# Patient Record
Sex: FEMALE | Employment: OTHER | URBAN - METROPOLITAN AREA
[De-identification: names, ages, dates, MRNs, and addresses within clinical notes are randomized per-mention and may not be internally consistent; named-entity substitution may affect disease eponyms.]

---

## 2017-02-08 ENCOUNTER — OFFICE VISIT (OUTPATIENT)
Dept: HEMATOLOGY | Age: 72
End: 2017-02-08

## 2017-02-08 VITALS
TEMPERATURE: 97.1 F | SYSTOLIC BLOOD PRESSURE: 161 MMHG | BODY MASS INDEX: 24.24 KG/M2 | DIASTOLIC BLOOD PRESSURE: 80 MMHG | OXYGEN SATURATION: 97 % | HEART RATE: 94 BPM | WEIGHT: 142 LBS | RESPIRATION RATE: 19 BRPM | HEIGHT: 64 IN

## 2017-02-08 DIAGNOSIS — R74.8 ELEVATED LIVER ENZYMES: Primary | ICD-10-CM

## 2017-02-08 RX ORDER — ONDANSETRON 4 MG/1
TABLET, ORALLY DISINTEGRATING ORAL
COMMUNITY
Start: 2017-01-09

## 2017-02-08 NOTE — PROGRESS NOTES
2 Jaimedeena Anthony Varner MD, JESÚS Fleming PA-C Erskine Serene, MD, JESÚS Heard NP        at [address], [address] Abilio Lewis  22.     [phone]     FAX: [phone]    at [facility], [address] [place] [address], [address]     [phone]     FAX: [phone]       Patient Care Team:  Kayli Carter MD as PCP - General (Family Practice)  Jael Churchill MD (Gastroenterology)    Problem List  Date Reviewed: 2/8/2017          Codes Class Noted    Elevated liver enzymes ICD-10-CM: R74.8  ICD-9-CM: 790.5  7/6/2015        Frequent UTI ICD-10-CM: N39.0  ICD-9-CM: 599.0  7/6/2015        Depression ICD-10-CM: F32.9  ICD-9-CM: 709  7/6/2015        Hypertension ICD-10-CM: I10  ICD-9-CM: 401.9  7/6/2015        IBS (irritable bowel syndrome) ICD-10-CM: K58.9  ICD-9-CM: 564.1  7/6/2015        S/P partial colectomy ICD-10-CM: Z90.49  ICD-9-CM: V45.89  7/6/2015    Overview Signed 7/6/2015 10:50 AM by MY Klein     diverticulosis                     The physicians listed above have asked me to see Jayce Cruz in consultation regarding elevated liver enzymes presumed to be due to fatty liver disease and to perform assessment of fibrosis by means of in-office fibroscan analysis. The active problem list, all pertinent past medical history, medications, liver histology, endoscopic studies, radiologic findings and laboratory findings related to the liver disorder were reviewed with the patient. The patient participated in a clinical trail at [facility] [place] [facility] [address] for LOPEZ last year. She has had elevated liver enzymes for over 5 years. She reports that liver biopsy was performed ~6 years ago, this was consistent with \"little scar tissue. \"  Patient was originally worked up for hemochromatosis and was found to have one gene mutation. The patient is a 67 y.o.  female who was diagnosed with liver disease  in 2010. The patient has no symptoms which could be attributed to the liver disorder. The patient completes all daily activities without any functional limitations. The patient has not experienced fatigue, fevers, chills, shortness of breath, chest pain, pain in the right side over the liver, diffuse abdominal pain, nausea, vomiting, constipation, diarrhrea, dry eyes, dry mouth, arthralgias, myalgias, yellowing of the eyes or skin, itching, dark urine, problems concentrating, swelling of the abdomen, swelling of the lower extremities, hematemesis, or hematochezia. ALLERGIES:  Allergies   Allergen Reactions    Flagyl [Metronidazole] Nausea Only    Macrobid [Nitrofurantoin Monohyd/M-Cryst] Nausea Only    Sulfa (Sulfonamide Antibiotics) Rash       MEDICATIONS:  Current Outpatient Prescriptions   Medication Sig    lisinopril (PRINIVIL, ZESTRIL) 20 mg tablet Take  by mouth daily.  amLODIPine (NORVASC) 5 mg tablet Take 5 mg by mouth daily.  omeprazole (PRILOSEC) 20 mg capsule Take 20 mg by mouth daily.  metoclopramide HCl (REGLAN) 5 mg tablet Take 5 mg by mouth Before breakfast, lunch, and dinner.  ALPRAZolam (XANAX) 0.5 mg tablet Take  by mouth.  sertraline (ZOLOFT) 100 mg tablet Take  by mouth daily.  ondansetron (ZOFRAN ODT) 4 mg disintegrating tablet      No current facility-administered medications for this visit. SYSTEM REVIEW NOT RELATED TO LIVER DISEASE OR REVIEWED ABOVE:  Constitution systems: Negative for fever, chills, weight gain, weight loss. Eyes: Negative for visual changes. ENT: Negative for sore throat, painful swallowing. Respiratory: Negative for cough, hemoptysis, SOB. Cardiology: Negative for chest pain, palpitations. GI:  Negative for constipation or diarrhea. : Positive for frequent UTIs, dysuria, hematuria, nocturia.    Skin: Negative for rash. Hematology: Negative for easy bruising, blood clots. Musculo-skelatal: Negative for back pain, muscle pain, weakness. Neurologic: Negative for headaches, dizziness, vertigo, memory problems not related to HE. Psychology: Negative for anxiety, depression. FAMILY HISTORY:  There is no family history of liver disease. SOCIAL HISTORY:  The patient is . The patient 1 child. The patient has never used tobacco products. The patient consumes alcoholic beverages rarely and never in excess. The patient does not work. PHYSICAL EXAMINATION:  Visit Vitals    /80 (BP 1 Location: Left arm, BP Patient Position: Sitting)    Pulse 94    Temp 97.1 °F (36.2 °C) (Oral)    Resp 19    Ht 5' 4\" (1.626 m)    Wt 142 lb (64.4 kg)    SpO2 97%    BMI 24.37 kg/m2     General: No acute distress. Skin: No spider angiomata. No jaundice. No palmar erythema. Abdomen: Soft non-tender. Liver size normal to percussion/palpation. Spleen not palpable. No obvious ascites. Extremities: No edema. No muscle wasting. No gross arthritic changes. Neurologic: Alert and oriented. Cranial nerves grossly intact. No asterixis. LABORATORY STUDIES:  [facility] [address] [place] [address] & Units 4/25/2016   AST 0 - 40 IU/L 36   ALT 0 - 32 IU/L 33 (H)   Alk Phos 39 - 117 IU/L 78   Bili, Total 0.0 - 1.2 mg/dL 0.5   Albumin 3.5 - 4.8 g/dL 4.8   BUN 8 - 27 mg/dL 18   Creat 0.57 - 1.00 mg/dL 0.91   Na 134 - 144 mmol/L 137   K 3.5 - 5.2 mmol/L 4.4   Cl 97 - 108 mmol/L 99   CO2 18 - 29 mmol/L 22   Glucose 65 - 99 mg/dL 91       LIVER HISTOLOGY:  7/2015. FibroScan performed at [address]. EkPa was 13.5. Suggested fibrosis level is F4.  11/2015. Liver biopsy by Dr. Analisa Martin. Consistent with LOPEZ. Portal fibrosis. 02/2017. FibroScan performed at [address]. EkPa was 11.3. Suggested fibrosis level is F3/F4 in patients with NAFLD.   Her narrow IQR of 0.9 indicates that she no longer has much fat in her liver. ASSESSMENT AND PLAN:  Elevated liver enzymes apparently due to fatty liver with bridging fibrosis on today's FIbroscan analysis. Assessment of fibrosis was made through performance of fibroscan in the office today. This assessment appears to represent a significant regression from her fibroscan in 07/2015. Today's findings are are more in line with the biopsy from 11/2015. The narrow IQR demonstrates that fat is resolving from the liver. The results of the analysis were shared with the patient in the office at the time of the procedure. A copy of the report was provided to the patient and will be forwarded to the referring medical practice. All questions were answered. The patient expressed a clear understanding of the above. Follow-up with referring physician.     Erving Rinne, CHIRAG-C   Liver [place] [address], [address] [facility]  [phone]

## 2022-09-05 ENCOUNTER — HOSPITAL ENCOUNTER (INPATIENT)
Age: 77
LOS: 14 days | Discharge: HOME HEALTH | DRG: 872 | End: 2022-09-19
Attending: EMERGENCY MEDICINE | Admitting: INTERNAL MEDICINE
Payer: MEDICARE

## 2022-09-05 ENCOUNTER — APPOINTMENT (OUTPATIENT)
Dept: CT IMAGING | Age: 77
DRG: 872 | End: 2022-09-05
Attending: EMERGENCY MEDICINE
Payer: MEDICARE

## 2022-09-05 ENCOUNTER — APPOINTMENT (OUTPATIENT)
Dept: GENERAL RADIOLOGY | Age: 77
DRG: 872 | End: 2022-09-05
Attending: EMERGENCY MEDICINE
Payer: MEDICARE

## 2022-09-05 DIAGNOSIS — N17.9 AKI (ACUTE KIDNEY INJURY) (HCC): ICD-10-CM

## 2022-09-05 DIAGNOSIS — R78.81 BACTEREMIA DUE TO ESCHERICHIA COLI: ICD-10-CM

## 2022-09-05 DIAGNOSIS — B96.20 BACTEREMIA DUE TO ESCHERICHIA COLI: ICD-10-CM

## 2022-09-05 DIAGNOSIS — N12 PYELONEPHRITIS: ICD-10-CM

## 2022-09-05 DIAGNOSIS — N17.9 SEPSIS WITH ACUTE RENAL FAILURE WITHOUT SEPTIC SHOCK, DUE TO UNSPECIFIED ORGANISM, UNSPECIFIED ACUTE RENAL FAILURE TYPE (HCC): Primary | ICD-10-CM

## 2022-09-05 DIAGNOSIS — A41.9 SEPSIS WITH ACUTE RENAL FAILURE WITHOUT SEPTIC SHOCK, DUE TO UNSPECIFIED ORGANISM, UNSPECIFIED ACUTE RENAL FAILURE TYPE (HCC): Primary | ICD-10-CM

## 2022-09-05 DIAGNOSIS — R65.20 SEPSIS WITH ACUTE RENAL FAILURE WITHOUT SEPTIC SHOCK, DUE TO UNSPECIFIED ORGANISM, UNSPECIFIED ACUTE RENAL FAILURE TYPE (HCC): Primary | ICD-10-CM

## 2022-09-05 DIAGNOSIS — R11.2 NAUSEA AND VOMITING IN ADULT: ICD-10-CM

## 2022-09-05 DIAGNOSIS — R10.2 SUPRAPUBIC ABDOMINAL PAIN: ICD-10-CM

## 2022-09-05 DIAGNOSIS — D72.829 LEUKOCYTOSIS, UNSPECIFIED TYPE: ICD-10-CM

## 2022-09-05 DIAGNOSIS — R77.8 ELEVATED TROPONIN: ICD-10-CM

## 2022-09-05 DIAGNOSIS — R26.9 GAIT DISTURBANCE: ICD-10-CM

## 2022-09-05 PROBLEM — N39.0 UTI (URINARY TRACT INFECTION): Status: ACTIVE | Noted: 2022-09-05

## 2022-09-05 LAB
ALBUMIN SERPL-MCNC: 3.7 G/DL (ref 3.5–5)
ALBUMIN/GLOB SERPL: 0.8 {RATIO} (ref 1.1–2.2)
ALP SERPL-CCNC: 79 U/L (ref 45–117)
ALT SERPL-CCNC: 22 U/L (ref 12–78)
ANION GAP SERPL CALC-SCNC: 11 MMOL/L (ref 5–15)
APPEARANCE UR: ABNORMAL
AST SERPL-CCNC: 20 U/L (ref 15–37)
BACTERIA URNS QL MICRO: ABNORMAL /HPF
BASOPHILS # BLD: 0 K/UL (ref 0–0.1)
BASOPHILS NFR BLD: 0 % (ref 0–1)
BILIRUB SERPL-MCNC: 1.5 MG/DL (ref 0.2–1)
BILIRUB UR QL: NEGATIVE
BUN SERPL-MCNC: 44 MG/DL (ref 6–20)
BUN/CREAT SERPL: 24 (ref 12–20)
CALCIUM SERPL-MCNC: 8.7 MG/DL (ref 8.5–10.1)
CHLORIDE SERPL-SCNC: 104 MMOL/L (ref 97–108)
CO2 SERPL-SCNC: 20 MMOL/L (ref 21–32)
COLOR UR: ABNORMAL
COMMENT, HOLDF: NORMAL
COVID-19 RAPID TEST, COVR: NOT DETECTED
CREAT SERPL-MCNC: 1.82 MG/DL (ref 0.55–1.02)
DIFFERENTIAL METHOD BLD: ABNORMAL
EOSINOPHIL # BLD: 0 K/UL (ref 0–0.4)
EOSINOPHIL NFR BLD: 0 % (ref 0–7)
EPITH CASTS URNS QL MICRO: ABNORMAL /LPF
ERYTHROCYTE [DISTWIDTH] IN BLOOD BY AUTOMATED COUNT: 13.5 % (ref 11.5–14.5)
FLUAV AG NPH QL IA: NEGATIVE
FLUBV AG NOSE QL IA: NEGATIVE
GLOBULIN SER CALC-MCNC: 4.6 G/DL (ref 2–4)
GLUCOSE SERPL-MCNC: 187 MG/DL (ref 65–100)
GLUCOSE UR STRIP.AUTO-MCNC: NEGATIVE MG/DL
HCT VFR BLD AUTO: 39.2 % (ref 35–47)
HGB BLD-MCNC: 13.9 G/DL (ref 11.5–16)
HGB UR QL STRIP: ABNORMAL
HYALINE CASTS URNS QL MICRO: ABNORMAL /LPF (ref 0–5)
IMM GRANULOCYTES # BLD AUTO: 0.2 K/UL (ref 0–0.04)
IMM GRANULOCYTES NFR BLD AUTO: 1 % (ref 0–0.5)
KETONES UR QL STRIP.AUTO: ABNORMAL MG/DL
LACTATE BLD-SCNC: 1.77 MMOL/L (ref 0.4–2)
LEUKOCYTE ESTERASE UR QL STRIP.AUTO: ABNORMAL
LIPASE SERPL-CCNC: 44 U/L (ref 73–393)
LYMPHOCYTES # BLD: 0.5 K/UL (ref 0.8–3.5)
LYMPHOCYTES NFR BLD: 3 % (ref 12–49)
MAGNESIUM SERPL-MCNC: 1.7 MG/DL (ref 1.6–2.4)
MCH RBC QN AUTO: 31.8 PG (ref 26–34)
MCHC RBC AUTO-ENTMCNC: 35.5 G/DL (ref 30–36.5)
MCV RBC AUTO: 89.7 FL (ref 80–99)
MONOCYTES # BLD: 0.9 K/UL (ref 0–1)
MONOCYTES NFR BLD: 5 % (ref 5–13)
NEUTS SEG # BLD: 16.7 K/UL (ref 1.8–8)
NEUTS SEG NFR BLD: 91 % (ref 32–75)
NITRITE UR QL STRIP.AUTO: POSITIVE
NRBC # BLD: 0 K/UL (ref 0–0.01)
NRBC BLD-RTO: 0 PER 100 WBC
PH UR STRIP: 5 [PH] (ref 5–8)
PLATELET # BLD AUTO: 82 K/UL (ref 150–400)
PMV BLD AUTO: 10.8 FL (ref 8.9–12.9)
POTASSIUM SERPL-SCNC: 3.4 MMOL/L (ref 3.5–5.1)
PROCALCITONIN SERPL-MCNC: 155.58 NG/ML
PROT SERPL-MCNC: 8.3 G/DL (ref 6.4–8.2)
PROT UR STRIP-MCNC: 300 MG/DL
RBC # BLD AUTO: 4.37 M/UL (ref 3.8–5.2)
RBC #/AREA URNS HPF: ABNORMAL /HPF (ref 0–5)
RBC MORPH BLD: ABNORMAL
SAMPLES BEING HELD,HOLD: NORMAL
SODIUM SERPL-SCNC: 135 MMOL/L (ref 136–145)
SOURCE, COVRS: NORMAL
SP GR UR REFRACTOMETRY: 1.02 (ref 1–1.03)
TROPONIN-HIGH SENSITIVITY: 334 NG/L (ref 0–51)
UR CULT HOLD, URHOLD: NORMAL
UROBILINOGEN UR QL STRIP.AUTO: 1 EU/DL (ref 0.2–1)
WBC # BLD AUTO: 18.3 K/UL (ref 3.6–11)
WBC MORPH BLD: ABNORMAL
WBC URNS QL MICRO: >100 /HPF (ref 0–4)

## 2022-09-05 PROCEDURE — 74011000258 HC RX REV CODE- 258: Performed by: EMERGENCY MEDICINE

## 2022-09-05 PROCEDURE — 96361 HYDRATE IV INFUSION ADD-ON: CPT

## 2022-09-05 PROCEDURE — 93005 ELECTROCARDIOGRAM TRACING: CPT

## 2022-09-05 PROCEDURE — 84484 ASSAY OF TROPONIN QUANT: CPT

## 2022-09-05 PROCEDURE — 87635 SARS-COV-2 COVID-19 AMP PRB: CPT

## 2022-09-05 PROCEDURE — 87186 SC STD MICRODIL/AGAR DIL: CPT

## 2022-09-05 PROCEDURE — 87077 CULTURE AEROBIC IDENTIFY: CPT

## 2022-09-05 PROCEDURE — 77030038269 HC DRN EXT URIN PURWCK BARD -A

## 2022-09-05 PROCEDURE — 74011250636 HC RX REV CODE- 250/636: Performed by: EMERGENCY MEDICINE

## 2022-09-05 PROCEDURE — 74011250637 HC RX REV CODE- 250/637: Performed by: EMERGENCY MEDICINE

## 2022-09-05 PROCEDURE — 96365 THER/PROPH/DIAG IV INF INIT: CPT

## 2022-09-05 PROCEDURE — 71045 X-RAY EXAM CHEST 1 VIEW: CPT

## 2022-09-05 PROCEDURE — 83735 ASSAY OF MAGNESIUM: CPT

## 2022-09-05 PROCEDURE — 87804 INFLUENZA ASSAY W/OPTIC: CPT

## 2022-09-05 PROCEDURE — 85025 COMPLETE CBC W/AUTO DIFF WBC: CPT

## 2022-09-05 PROCEDURE — 74176 CT ABD & PELVIS W/O CONTRAST: CPT

## 2022-09-05 PROCEDURE — 87086 URINE CULTURE/COLONY COUNT: CPT

## 2022-09-05 PROCEDURE — 99285 EMERGENCY DEPT VISIT HI MDM: CPT

## 2022-09-05 PROCEDURE — 87150 DNA/RNA AMPLIFIED PROBE: CPT

## 2022-09-05 PROCEDURE — 65270000029 HC RM PRIVATE

## 2022-09-05 PROCEDURE — 96375 TX/PRO/DX INJ NEW DRUG ADDON: CPT

## 2022-09-05 PROCEDURE — 70450 CT HEAD/BRAIN W/O DYE: CPT

## 2022-09-05 PROCEDURE — 87040 BLOOD CULTURE FOR BACTERIA: CPT

## 2022-09-05 PROCEDURE — 84145 PROCALCITONIN (PCT): CPT

## 2022-09-05 PROCEDURE — 80053 COMPREHEN METABOLIC PANEL: CPT

## 2022-09-05 PROCEDURE — 83690 ASSAY OF LIPASE: CPT

## 2022-09-05 PROCEDURE — 96366 THER/PROPH/DIAG IV INF ADDON: CPT

## 2022-09-05 PROCEDURE — 81001 URINALYSIS AUTO W/SCOPE: CPT

## 2022-09-05 PROCEDURE — 83605 ASSAY OF LACTIC ACID: CPT

## 2022-09-05 PROCEDURE — 36415 COLL VENOUS BLD VENIPUNCTURE: CPT

## 2022-09-05 RX ORDER — SODIUM CHLORIDE 0.9 % (FLUSH) 0.9 %
5-40 SYRINGE (ML) INJECTION AS NEEDED
Status: DISCONTINUED | OUTPATIENT
Start: 2022-09-05 | End: 2022-09-19 | Stop reason: HOSPADM

## 2022-09-05 RX ORDER — ACETAMINOPHEN 650 MG/1
650 SUPPOSITORY RECTAL
Status: DISCONTINUED | OUTPATIENT
Start: 2022-09-05 | End: 2022-09-19 | Stop reason: HOSPADM

## 2022-09-05 RX ORDER — SODIUM CHLORIDE 0.9 % (FLUSH) 0.9 %
5-10 SYRINGE (ML) INJECTION AS NEEDED
Status: DISCONTINUED | OUTPATIENT
Start: 2022-09-05 | End: 2022-09-19 | Stop reason: HOSPADM

## 2022-09-05 RX ORDER — LISINOPRIL 5 MG/1
5 TABLET ORAL DAILY
Status: DISCONTINUED | OUTPATIENT
Start: 2022-09-06 | End: 2022-09-06

## 2022-09-05 RX ORDER — ACETAMINOPHEN 500 MG
1000 TABLET ORAL
Status: COMPLETED | OUTPATIENT
Start: 2022-09-05 | End: 2022-09-05

## 2022-09-05 RX ORDER — ALPRAZOLAM 0.5 MG/1
0.5 TABLET ORAL
Status: DISCONTINUED | OUTPATIENT
Start: 2022-09-05 | End: 2022-09-19 | Stop reason: HOSPADM

## 2022-09-05 RX ORDER — POLYETHYLENE GLYCOL 3350 17 G/17G
17 POWDER, FOR SOLUTION ORAL DAILY PRN
Status: DISCONTINUED | OUTPATIENT
Start: 2022-09-05 | End: 2022-09-19 | Stop reason: HOSPADM

## 2022-09-05 RX ORDER — AMLODIPINE BESYLATE 5 MG/1
5 TABLET ORAL DAILY
Status: DISCONTINUED | OUTPATIENT
Start: 2022-09-06 | End: 2022-09-19 | Stop reason: HOSPADM

## 2022-09-05 RX ORDER — SODIUM CHLORIDE, SODIUM LACTATE, POTASSIUM CHLORIDE, CALCIUM CHLORIDE 600; 310; 30; 20 MG/100ML; MG/100ML; MG/100ML; MG/100ML
50 INJECTION, SOLUTION INTRAVENOUS CONTINUOUS
Status: DISCONTINUED | OUTPATIENT
Start: 2022-09-05 | End: 2022-09-13

## 2022-09-05 RX ORDER — ACETAMINOPHEN 325 MG/1
650 TABLET ORAL
Status: DISCONTINUED | OUTPATIENT
Start: 2022-09-05 | End: 2022-09-19 | Stop reason: HOSPADM

## 2022-09-05 RX ORDER — SODIUM CHLORIDE 0.9 % (FLUSH) 0.9 %
5-40 SYRINGE (ML) INJECTION EVERY 8 HOURS
Status: DISCONTINUED | OUTPATIENT
Start: 2022-09-05 | End: 2022-09-19 | Stop reason: HOSPADM

## 2022-09-05 RX ORDER — ONDANSETRON 2 MG/ML
4 INJECTION INTRAMUSCULAR; INTRAVENOUS
Status: DISCONTINUED | OUTPATIENT
Start: 2022-09-05 | End: 2022-09-19 | Stop reason: HOSPADM

## 2022-09-05 RX ORDER — ONDANSETRON 2 MG/ML
4 INJECTION INTRAMUSCULAR; INTRAVENOUS
Status: COMPLETED | OUTPATIENT
Start: 2022-09-05 | End: 2022-09-05

## 2022-09-05 RX ORDER — SERTRALINE HYDROCHLORIDE 50 MG/1
100 TABLET, FILM COATED ORAL DAILY
Status: DISCONTINUED | OUTPATIENT
Start: 2022-09-06 | End: 2022-09-19 | Stop reason: HOSPADM

## 2022-09-05 RX ORDER — ENOXAPARIN SODIUM 100 MG/ML
30 INJECTION SUBCUTANEOUS DAILY
Status: DISCONTINUED | OUTPATIENT
Start: 2022-09-06 | End: 2022-09-06

## 2022-09-05 RX ORDER — PANTOPRAZOLE SODIUM 40 MG/1
40 TABLET, DELAYED RELEASE ORAL
Status: DISCONTINUED | OUTPATIENT
Start: 2022-09-06 | End: 2022-09-19 | Stop reason: HOSPADM

## 2022-09-05 RX ORDER — ONDANSETRON 4 MG/1
4 TABLET, ORALLY DISINTEGRATING ORAL
Status: DISCONTINUED | OUTPATIENT
Start: 2022-09-05 | End: 2022-09-19 | Stop reason: HOSPADM

## 2022-09-05 RX ADMIN — ONDANSETRON 4 MG: 2 INJECTION INTRAMUSCULAR; INTRAVENOUS at 18:22

## 2022-09-05 RX ADMIN — PIPERACILLIN AND TAZOBACTAM 4.5 G: 4; .5 INJECTION, POWDER, LYOPHILIZED, FOR SOLUTION INTRAVENOUS at 20:03

## 2022-09-05 RX ADMIN — ACETAMINOPHEN 1000 MG: 500 TABLET ORAL at 18:22

## 2022-09-05 RX ADMIN — SODIUM CHLORIDE 1000 ML: 9 INJECTION, SOLUTION INTRAVENOUS at 18:22

## 2022-09-05 NOTE — PROGRESS NOTES
Pharmacy Dosing Services: Zosyn    Zosyn 3.375 gm IV Q8H automatically adjusted per protocol to Zosyn 4.5 gm IV x 1 over 30 min followed by Zosyn 3.375 gm IV Q8H (extended-infusion over 4 hours) for CrCl >/= 20 mL/min  Maintenance dose scheduled to begin 6 hours after load for CrCl >/= 20 mL/min and 8 hours after load for CrCl < 20 mL/min    Thank you,  Kenney KING [facility]

## 2022-09-05 NOTE — ED TRIAGE NOTES
Patient arrives via EMS from home, co vomiting and diarrhea x 1 day. Per EMS patient has a GCS of 15 but is slightly confused.

## 2022-09-05 NOTE — ED PROVIDER NOTES
Vomiting   Associated symptoms include diarrhea. Diarrhea   Associated symptoms include diarrhea and vomiting. No past medical history on file. No past surgical history on file. No family history on file. Social History     Socioeconomic History    Marital status:      Spouse name: Not on file    Number of children: Not on file    Years of education: Not on file    Highest education level: Not on file   Occupational History    Not on file   Tobacco Use    Smoking status: Never    Smokeless tobacco: Never   Substance and Sexual Activity    Alcohol use: No    Drug use: Not on file    Sexual activity: Not on file   Other Topics Concern    Not on file   Social History Narrative    Not on file     Social Determinants of Health     Financial Resource Strain: Not on file   Food Insecurity: Not on file   Transportation Needs: Not on file   Physical Activity: Not on file   Stress: Not on file   Social Connections: Not on file   Intimate Partner Violence: Not on file   Housing Stability: Not on file         ALLERGIES: Flagyl [metronidazole], Macrobid [nitrofurantoin monohyd/m-cryst], and Sulfa (sulfonamide antibiotics)    Review of Systems   Gastrointestinal:  Positive for diarrhea and vomiting. Vitals:    09/05/22 1750 09/05/22 1925   BP: (!) 153/72 122/72   Pulse: (!) 104 (!) 105   Resp: 20 (!) 33   Temp: (!) 100.6 °F (38.1 °C) 99.7 °F (37.6 °C)   SpO2: 96% 94%   Weight: 64.4 kg (142 lb)    Height: 5' 4\" (1.626 m)             Physical Exam       MDM     Amount and/or Complexity of Data Reviewed  Clinical lab tests: reviewed  Tests in the radiology section of CPT®: reviewed  Decide to obtain previous medical records or to obtain history from someone other than the patient: yes      76-year-old female presents with fever, tachycardia, and confusion concerning for metabolic encephalopathy. Notes urinary symptoms and suspect urinary source.   Stable blood pressure satting normally on room air. Labs returned showing normal lactic acid at 1.77, but significant leukocytosis of 18.3, PLT 82, normal Hg, bicarb 20, but normal anion gap, elevated creatinine at 1.82, BUN 44, concerning for ALBINA, glucose 187, T bili 1.5, but other LFTs. unremarkable. Troponin returned elevated at 334 likely due to demand ischemia, Pro-Pierce elevated at 155. 58. Concern for sepsis. Rapid influenza and COVID swab negative. CXR viewed by myself and read by radiology showing no acute abnormalities. CT head shows no acute intracranial abnormalities. CT abdomen pelvis shows     She was given IV fluid bolus and IV Zofran, Tylenol, and IV Zosyn empirically    IMPRESSION:  1. Sepsis with acute renal failure without septic shock, due to unspecified organism, unspecified acute renal failure type ([facility]    2. ALBINA (acute kidney injury) ([facility]    3. Elevated troponin    4. Leukocytosis, unspecified type    5. Nausea and vomiting in adult    6. Suprapubic abdominal pain    7. Pyelonephritis        - Broad Spectrum Antibiotics ordered: Zosyn  - Repeat lactic acid ordered for time N/a initial normal  - Re-assessment performed at time 9:00pm and clinical condition improving.    - Hypotension or Lactic Acidosis present (SBP<90, MAP<65, Lactate >4): NO IVF:  Not indicated due to septic shock not present  - Persistent Hypotension despite IVF resuscitation: NO  Vasopressors: Not indicated due to septic shock not present    Plan:  Admit to Step down    Total critical care time spent exclusive of procedures:  60 minutes    Trudi Briscoe DO    Procedures    1822 EKG shows sinus tachycardia with a rate of 101 bpm with no acute ST or T wave abnormalities suggestive of ischemia. Perfect Serve Consult for Admission  9:26 PM    ED Room Number: ER15/15  Patient Name and age:  Yeison Mckeon 65 y.o.  female  Working Diagnosis:   1.  Sepsis with acute renal failure without septic shock, due to unspecified organism, unspecified acute renal failure type ([facility] [address]    2. ALBINA (acute kidney injury) ([facility] [facility]    3. Elevated troponin    4. Leukocytosis, unspecified type    5. Nausea and vomiting in adult    6. Suprapubic abdominal pain    7. Pyelonephritis        COVID-19 Suspicion:  no  Sepsis present:  yes  Reassessment needed: no  Code Status:  Full Code  Readmission: no  Isolation Requirements:  no  Recommended Level of Care:  step down  Department:[facility] ED - [phone]  Other: 76-year-old female with known kidney stones within her kidney presents with dysuria but no flank pain for the last couple of days and increased fever and confusion. Labs show leukocytosis and ALBINA and mildly elevated troponin, likely secondary to demand ischemia, but normal lactate. Stable blood pressure. CT scan shows no ureteral stones, only known renal stones. Awaiting UA. Given empiric IV Zosyn.

## 2022-09-05 NOTE — ED NOTES
Patient reports urgency, frequency, and dysuria x 2 days. Patient adds kidney stone.  Per patient has frequent UTIs

## 2022-09-06 ENCOUNTER — APPOINTMENT (OUTPATIENT)
Dept: ULTRASOUND IMAGING | Age: 77
DRG: 872 | End: 2022-09-06
Attending: HOSPITALIST
Payer: MEDICARE

## 2022-09-06 LAB
25(OH)D3 SERPL-MCNC: 36.7 NG/ML (ref 30–100)
ACC. NO. FROM MICRO ORDER, ACCP: ABNORMAL
ACINETOBACTER CALCOACETICUS-BAUMANII COMPLEX, ACBCX: NOT DETECTED
ANION GAP SERPL CALC-SCNC: 9 MMOL/L (ref 5–15)
ATRIAL RATE: 101 BPM
BACTEROIDES FRAGILIS, BFRA: NOT DETECTED
BIOFIRE COMMENT, BCIDPF: ABNORMAL
BUN SERPL-MCNC: 43 MG/DL (ref 6–20)
BUN/CREAT SERPL: 27 (ref 12–20)
C GLABRATA DNA VAG QL NAA+PROBE: NOT DETECTED
CALCIUM SERPL-MCNC: 8.1 MG/DL (ref 8.5–10.1)
CALCIUM SERPL-MCNC: 8.4 MG/DL (ref 8.5–10.1)
CALCULATED P AXIS, ECG09: 11 DEGREES
CALCULATED R AXIS, ECG10: -29 DEGREES
CALCULATED T AXIS, ECG11: 39 DEGREES
CANDIDA ALBICANS: NOT DETECTED
CANDIDA AURIS, CAAU: NOT DETECTED
CANDIDA KRUSEI, CKRP: NOT DETECTED
CANDIDA PARAPSILOSIS, CPAUP: NOT DETECTED
CANDIDA TROPICALIS, CTROP: NOT DETECTED
CHLORIDE SERPL-SCNC: 107 MMOL/L (ref 97–108)
CO2 SERPL-SCNC: 22 MMOL/L (ref 21–32)
CREAT SERPL-MCNC: 1.59 MG/DL (ref 0.55–1.02)
CREAT UR-MCNC: 88 MG/DL
CRYPTO NEOFORMANS/GATTII, CRYNEG: NOT DETECTED
CTX-M (ESBL RESISTANT GENE), CTX: NOT DETECTED
DIAGNOSIS, 93000: NORMAL
ENTEROBACTER CLOACAE COMPLEX, ECCP: NOT DETECTED
ENTEROBACTERALES SP. , ENBLS: DETECTED
ENTEROCOCCUS FAECALIS, ENFA: NOT DETECTED
ENTEROCOCCUS FAECIUM, ENFAM: NOT DETECTED
ERYTHROCYTE [DISTWIDTH] IN BLOOD BY AUTOMATED COUNT: 13.5 % (ref 11.5–14.5)
ESCHERICHIA COLI: DETECTED
EST. AVERAGE GLUCOSE BLD GHB EST-MCNC: 103 MG/DL
GLUCOSE SERPL-MCNC: 149 MG/DL (ref 65–100)
HAEMOPHILUS INFLUENZAE, HMI: NOT DETECTED
HBA1C MFR BLD: 5.2 % (ref 4–5.6)
HCT VFR BLD AUTO: 35.2 % (ref 35–47)
HGB BLD-MCNC: 12.4 G/DL (ref 11.5–16)
IMP (CARBAPENEMASE RESISTANT GENE), IMPC: NOT DETECTED
KLEBSIELLA AEROGENES, KLAE: NOT DETECTED
KLEBSIELLA OXYTOCA: NOT DETECTED
KLEBSIELLA PNEUMONIAE GROUP, KPPG: NOT DETECTED
KPC (CARBAPENEM RESISTANCE GENE): NOT DETECTED
LISTERIA MONOCYTOGENES, LMONP: NOT DETECTED
MAGNESIUM SERPL-MCNC: 2.1 MG/DL (ref 1.6–2.4)
MCH RBC QN AUTO: 32.5 PG (ref 26–34)
MCHC RBC AUTO-ENTMCNC: 35.2 G/DL (ref 30–36.5)
MCR-1 (COLISTIN RESISTANT GENE), MCR: NOT DETECTED
MCV RBC AUTO: 92.4 FL (ref 80–99)
NDM (CARBAPENEMASE RESISTANT GENE), NDM: NOT DETECTED
NEISSERIA MENINGITIDIS, NMNI: NOT DETECTED
NRBC # BLD: 0 K/UL (ref 0–0.01)
NRBC BLD-RTO: 0 PER 100 WBC
OXA-48-LIKE (CARBAPENEMASE RESISTANT GENE), OXA48: NOT DETECTED
P-R INTERVAL, ECG05: 126 MS
PHOSPHATE SERPL-MCNC: 2.2 MG/DL (ref 2.6–4.7)
PLATELET # BLD AUTO: 65 K/UL (ref 150–400)
PMV BLD AUTO: 11.2 FL (ref 8.9–12.9)
POTASSIUM SERPL-SCNC: 3 MMOL/L (ref 3.5–5.1)
PROTEUS, PRP: NOT DETECTED
PSEUDOMONAS AERUGINOSA: NOT DETECTED
PTH-INTACT SERPL-MCNC: 54.2 PG/ML (ref 18.4–88)
Q-T INTERVAL, ECG07: 334 MS
QRS DURATION, ECG06: 74 MS
QTC CALCULATION (BEZET), ECG08: 433 MS
RBC # BLD AUTO: 3.81 M/UL (ref 3.8–5.2)
RESISTANT GENE SPACE, REGENE: ABNORMAL
SALMONELLA, SALMO: NOT DETECTED
SERRATIA MARCESCENS: NOT DETECTED
SODIUM SERPL-SCNC: 138 MMOL/L (ref 136–145)
SODIUM UR-SCNC: 42 MMOL/L
STAPH EPIDERMIDIS, STEP: NOT DETECTED
STAPH LUGDUNENSIS, STALUG: NOT DETECTED
STAPHYLOCOCCUS AUREUS: NOT DETECTED
STAPHYLOCOCCUS, STAPP: NOT DETECTED
STENO MALTOPHILIA, STMA: NOT DETECTED
STREPTOCOCCUS , STPSP: NOT DETECTED
STREPTOCOCCUS AGALACTIAE (GROUP B): NOT DETECTED
STREPTOCOCCUS PNEUMONIAE , SPNP: NOT DETECTED
STREPTOCOCCUS PYOGENES (GROUP A), SPYOP: NOT DETECTED
TROPONIN-HIGH SENSITIVITY: 317 NG/L (ref 0–51)
TROPONIN-HIGH SENSITIVITY: 362 NG/L (ref 0–51)
VENTRICULAR RATE, ECG03: 101 BPM
VIM (CARBAPENEMASE RESISTANT GENE), VIM: NOT DETECTED
WBC # BLD AUTO: 16.2 K/UL (ref 3.6–11)

## 2022-09-06 PROCEDURE — 82668 ASSAY OF ERYTHROPOIETIN: CPT

## 2022-09-06 PROCEDURE — 80048 BASIC METABOLIC PNL TOTAL CA: CPT

## 2022-09-06 PROCEDURE — 36415 COLL VENOUS BLD VENIPUNCTURE: CPT

## 2022-09-06 PROCEDURE — 74011250636 HC RX REV CODE- 250/636: Performed by: EMERGENCY MEDICINE

## 2022-09-06 PROCEDURE — 84300 ASSAY OF URINE SODIUM: CPT

## 2022-09-06 PROCEDURE — 84484 ASSAY OF TROPONIN QUANT: CPT

## 2022-09-06 PROCEDURE — 99223 1ST HOSP IP/OBS HIGH 75: CPT | Performed by: INTERNAL MEDICINE

## 2022-09-06 PROCEDURE — 82306 VITAMIN D 25 HYDROXY: CPT

## 2022-09-06 PROCEDURE — 74011000258 HC RX REV CODE- 258: Performed by: EMERGENCY MEDICINE

## 2022-09-06 PROCEDURE — 82570 ASSAY OF URINE CREATININE: CPT

## 2022-09-06 PROCEDURE — 74011250637 HC RX REV CODE- 250/637: Performed by: INTERNAL MEDICINE

## 2022-09-06 PROCEDURE — 74011000250 HC RX REV CODE- 250: Performed by: INTERNAL MEDICINE

## 2022-09-06 PROCEDURE — 83735 ASSAY OF MAGNESIUM: CPT

## 2022-09-06 PROCEDURE — 76770 US EXAM ABDO BACK WALL COMP: CPT

## 2022-09-06 PROCEDURE — 82652 VIT D 1 25-DIHYDROXY: CPT

## 2022-09-06 PROCEDURE — 74011250636 HC RX REV CODE- 250/636: Performed by: INTERNAL MEDICINE

## 2022-09-06 PROCEDURE — 65270000029 HC RM PRIVATE

## 2022-09-06 PROCEDURE — 83036 HEMOGLOBIN GLYCOSYLATED A1C: CPT

## 2022-09-06 PROCEDURE — 85027 COMPLETE CBC AUTOMATED: CPT

## 2022-09-06 PROCEDURE — 83970 ASSAY OF PARATHORMONE: CPT

## 2022-09-06 PROCEDURE — 84100 ASSAY OF PHOSPHORUS: CPT

## 2022-09-06 RX ORDER — HEPARIN SODIUM 5000 [USP'U]/ML
5000 INJECTION, SOLUTION INTRAVENOUS; SUBCUTANEOUS EVERY 8 HOURS
Status: DISCONTINUED | OUTPATIENT
Start: 2022-09-07 | End: 2022-09-11

## 2022-09-06 RX ORDER — LOPERAMIDE HYDROCHLORIDE 2 MG/1
2 CAPSULE ORAL
Status: DISCONTINUED | OUTPATIENT
Start: 2022-09-06 | End: 2022-09-19 | Stop reason: HOSPADM

## 2022-09-06 RX ADMIN — SODIUM CHLORIDE, POTASSIUM CHLORIDE, SODIUM LACTATE AND CALCIUM CHLORIDE 125 ML/HR: 600; 310; 30; 20 INJECTION, SOLUTION INTRAVENOUS at 00:53

## 2022-09-06 RX ADMIN — SODIUM CHLORIDE, POTASSIUM CHLORIDE, SODIUM LACTATE AND CALCIUM CHLORIDE 1000 ML: 600; 310; 30; 20 INJECTION, SOLUTION INTRAVENOUS at 00:02

## 2022-09-06 RX ADMIN — Medication 10 ML: at 13:27

## 2022-09-06 RX ADMIN — PIPERACILLIN AND TAZOBACTAM 3.38 G: 3; .375 INJECTION, POWDER, FOR SOLUTION INTRAVENOUS at 17:39

## 2022-09-06 RX ADMIN — ENOXAPARIN SODIUM 30 MG: 100 INJECTION SUBCUTANEOUS at 09:05

## 2022-09-06 RX ADMIN — SERTRALINE HYDROCHLORIDE 100 MG: 50 TABLET ORAL at 09:05

## 2022-09-06 RX ADMIN — Medication 10 ML: at 08:16

## 2022-09-06 RX ADMIN — LOPERAMIDE HYDROCHLORIDE 2 MG: 2 CAPSULE ORAL at 02:06

## 2022-09-06 RX ADMIN — Medication 10 ML: at 22:00

## 2022-09-06 RX ADMIN — LOPERAMIDE HYDROCHLORIDE 2 MG: 2 CAPSULE ORAL at 08:14

## 2022-09-06 RX ADMIN — AMLODIPINE BESYLATE 5 MG: 5 TABLET ORAL at 09:05

## 2022-09-06 RX ADMIN — PIPERACILLIN AND TAZOBACTAM 3.38 G: 3; .375 INJECTION, POWDER, FOR SOLUTION INTRAVENOUS at 10:41

## 2022-09-06 RX ADMIN — LISINOPRIL 5 MG: 5 TABLET ORAL at 09:05

## 2022-09-06 RX ADMIN — SODIUM CHLORIDE, POTASSIUM CHLORIDE, SODIUM LACTATE AND CALCIUM CHLORIDE 125 ML/HR: 600; 310; 30; 20 INJECTION, SOLUTION INTRAVENOUS at 21:22

## 2022-09-06 RX ADMIN — PIPERACILLIN AND TAZOBACTAM 3.38 G: 3; .375 INJECTION, POWDER, FOR SOLUTION INTRAVENOUS at 02:06

## 2022-09-06 RX ADMIN — PANTOPRAZOLE SODIUM 40 MG: 40 TABLET, DELAYED RELEASE ORAL at 08:07

## 2022-09-06 NOTE — PROGRESS NOTES
9/6/22  2:01 PM    Care Management Initial Evaluation:    CM reviewed EMR and met with patient in the room to complete the initial evaluation. Confirmed charted demographics. Reason for Admission:      1. Sepsis with acute renal failure without septic shock, due to unspecified organism, unspecified acute renal failure type ([facility] [address]    2. ALBINA (acute kidney injury) ([facility] [address]    3. Elevated troponin    4. Leukocytosis, unspecified type    5. Nausea and vomiting in adult    6. Suprapubic abdominal pain    7. Pyelonephritis                          RUR Score:      11%      Level: Low    Payor: Robbie Chuckerika Freddie           Plan for utilizing home health:   No current home health       PCP: First and Last name:  Mariah Wall MD     Name of Practice: [facility]   Are you a current patient: Yes/No: Yes   Approximate date of last visit: within the last 6 months   Can you participate in a virtual visit with your PCP:                     Current Advanced Directive/Advance Care Plan: DNR      Healthcare Decision Maker:   Click here to complete [place] including selection of the Healthcare Decision Maker Relationship (ie \"Primary\")  Hair Cade- spouse- [phone]                      Transition of Care Plan:                    -Patient lives with her spouse in a one level home with 4 entry steps  -She is normally independent with all ADL's and uses no DME  -Patient uses [facility] at [facility] for prescription needs    1). Patient admitted for emergent care  2). Family will transport at dc  3). CM following for dc needs    RENE Castro  Care Manager    Care Management Interventions  PCP Verified by CM: Yes  Mode of Transport at Discharge:  Other (see comment) (Family will transport at [employer])  Transition of Care Consult (CM Consult): Discharge Planning  Discharge Durable Medical Equipment: No  Physical Therapy Consult: No  Occupational Therapy Consult: No  Speech Therapy Consult: No  Support Systems: Spouse/Significant Other, Child(helene)  Confirm Follow Up Transport: Family  Discharge Location  Patient Expects to be Discharged to[de-identified] Unable to determine at this time

## 2022-09-06 NOTE — PROGRESS NOTES
Physician Progress Note      Jelani Haile  CSN #:                  [id number]  :                       1945  ADMIT DATE:       2022 5:46 PM  [address] [place] DATE:  RESPONDING  PROVIDER #:        Javier Zheng MD          QUERY TEXTFrancella Major Afternoon    This patient admitted for UTI    The patient is noted to have 3/4 SIRS + GNR bacteremia. If possible, please document in the progress notes and discharge summary if you are evaluating and /or treating any of the following: The medical record reflects the following:  Risk Factors: Female with chronic UTI, CKD  Clinical Indicators: Presented with Temp 100.6, - resp up to 33, Blood cx. Gram negative rods growing in both bottles, UA + Large Leuk Est, Nitriates, Bacteria 4+,  Treatment: Blood cx, IV ab, Piperacillin and tazobactram. Repeat blood cx. Lactic acid assessed, UA, ID consulted and following      Thank you  Evan Weber, RACHELLN,RN, CPHQ, CCDS, SMART  Options provided:  -- Sepsis, present on admission  -- UTI without Sepsis  -- Other - I will add my own diagnosis  -- Disagree - Not applicable / Not valid  -- Disagree - Clinically unable to determine / Unknown  -- Refer to Clinical Documentation Reviewer    PROVIDER RESPONSE TEXT:    This patient has sepsis which was present on admission.     Query created by: Melanie Stout on 2022 3:57 PM      Electronically signed by:  Javier Zheng MD 2022 4:00 PM

## 2022-09-06 NOTE — PROGRESS NOTES
Problem: Pressure Injury - Risk of  Goal: *Prevention of pressure injury  Description: Document Asad Scale and appropriate interventions in the flowsheet. Outcome: Progressing Towards Goal  Note: Pressure Injury Interventions:       Moisture Interventions: Absorbent underpads    Activity Interventions: Increase time out of bed    Mobility Interventions: HOB 30 degrees or less    Nutrition Interventions: Document food/fluid/supplement intake                     Problem: Patient Education: Go to Patient Education Activity  Goal: Patient/Family Education  Outcome: Progressing Towards Goal     Problem: Falls - Risk of  Goal: *Absence of Falls  Description: Document Michael Fall Risk and appropriate interventions in the flowsheet.   Outcome: Progressing Towards Goal  Note: Fall Risk Interventions:  Mobility Interventions: Bed/chair exit alarm    Mentation Interventions: Adequate sleep, hydration, pain control    Medication Interventions: Bed/chair exit alarm    Elimination Interventions: Call light in reach    History of Falls Interventions: Bed/chair exit alarm         Problem: Patient Education: Go to Patient Education Activity  Goal: Patient/Family Education  Outcome: Progressing Towards Goal

## 2022-09-06 NOTE — PROGRESS NOTES
Bedside shift change report given to Ramos Castellanos (oncoming nurse) by Howie Beltran (offgoing nurse). Report included the following information SBAR, Intake/Output, MAR, and Recent Results.

## 2022-09-06 NOTE — ED PROVIDER NOTES
79-year-old female with a history of HTN, anxiety, depression, GERD, and prior known kidney stone that has been present and monitored for some time in her kidney presents to the emergency department with a 2-day history of dysuria, urgency and frequency and increasing confusion. She also reportedly has had nausea and vomiting x1 day. On arrival the patient appears confused she is oriented to person but not to place or year and president. She notes lower abdominal pain but denies any flank pain. She also notes some occasional coughing rhinorrhea. No known sick contacts or recent travel. Vomiting   Associated symptoms include diarrhea. Diarrhea   Associated symptoms include diarrhea and vomiting. No past medical history on file. No past surgical history on file. No family history on file. Social History     Socioeconomic History    Marital status:      Spouse name: Not on file    Number of children: Not on file    Years of education: Not on file    Highest education level: Not on file   Occupational History    Not on file   Tobacco Use    Smoking status: Never    Smokeless tobacco: Never   Substance and Sexual Activity    Alcohol use: No    Drug use: Not on file    Sexual activity: Not on file   Other Topics Concern    Not on file   Social History Narrative    Not on file     Social Determinants of Health     Financial Resource Strain: Not on file   Food Insecurity: Not on file   Transportation Needs: Not on file   Physical Activity: Not on file   Stress: Not on file   Social Connections: Not on file   Intimate Partner Violence: Not on file   Housing Stability: Not on file         ALLERGIES: Flagyl [metronidazole], Macrobid [nitrofurantoin monohyd/m-cryst], and Sulfa (sulfonamide antibiotics)    Review of Systems   Unable to perform ROS: Mental status change (Confusion)   Gastrointestinal:  Positive for diarrhea and vomiting.      Vitals:    09/05/22 1750 09/05/22 1925 BP: (!) 153/72 122/72   Pulse: (!) 104 (!) 105   Resp: 20 (!) 33   Temp: (!) 100.6 °F (38.1 °C) 99.7 °F (37.6 °C)   SpO2: 96% 94%   Weight: 64.4 kg (142 lb)    Height: 5' 4\" (1.626 m)             Physical Exam  Vitals and nursing note reviewed. Constitutional:       General: She is not in acute distress. Appearance: Normal appearance. She is well-developed. She is ill-appearing. She is not diaphoretic. HENT:      Head: Normocephalic and atraumatic. Nose: Nose normal.   Eyes:      Extraocular Movements: Extraocular movements intact. Conjunctiva/sclera: Conjunctivae normal.      Pupils: Pupils are equal, round, and reactive to light. Cardiovascular:      Rate and Rhythm: Regular rhythm. Tachycardia present. Heart sounds: Normal heart sounds. Pulmonary:      Effort: Pulmonary effort is normal.      Breath sounds: Normal breath sounds. Abdominal:      General: There is no distension. Palpations: Abdomen is soft. Tenderness: There is abdominal tenderness in the suprapubic area. There is no right CVA tenderness, left CVA tenderness, guarding or rebound. Musculoskeletal:         General: No tenderness. Cervical back: Normal range of motion and neck supple. Skin:     General: Skin is warm and dry. Neurological:      General: No focal deficit present. Mental Status: She is alert. She is disoriented and confused. Cranial Nerves: No cranial nerve deficit. Sensory: No sensory deficit. Motor: No weakness. Coordination: Coordination normal.        MDM    79-year-old female presents with fever, tachycardia, and confusion concerning for metabolic encephalopathy. Notes urinary symptoms and suspect urinary source. Stable blood pressure satting normally on room air.     Labs returned showing normal lactic acid at 1.77, but significant leukocytosis of 18.3, PLT 82, normal Hg, bicarb 20, but normal anion gap, elevated creatinine at 1.82, BUN 44, concerning for ALBINA, glucose 187, T bili 1.5, but other LFTs. unremarkable. Troponin returned elevated at 334 likely due to demand ischemia, Pro-Pierce elevated at 155. 58. Concern for sepsis. Rapid influenza and COVID swab negative. CXR viewed by myself and read by radiology showing no acute abnormalities. CT head shows no acute intracranial abnormalities. CT abdomen pelvis shows     She was given IV fluid bolus and IV Zofran, Tylenol, and IV Zosyn empirically    IMPRESSION:  1. Sepsis with acute renal failure without septic shock, due to unspecified organism, unspecified acute renal failure type ([address]    2. ALBINA (acute kidney injury) ([address]    3. Elevated troponin    4. Leukocytosis, unspecified type    5. Nausea and vomiting in adult    6. Suprapubic abdominal pain    7. Pyelonephritis        - Broad Spectrum Antibiotics ordered: Zosyn  - Repeat lactic acid ordered for time N/a initial normal  - Re-assessment performed at time 9:00pm and clinical condition improving.    - Hypotension or Lactic Acidosis present (SBP<90, MAP<65, Lactate >4): NO IVF:  Not indicated due to septic shock not present  - Persistent Hypotension despite IVF resuscitation: NO  Vasopressors: Not indicated due to septic shock not present    Plan:  Admit to Step down    Total critical care time spent exclusive of procedures:  60 minutes    Max Carloser, DO    Procedures    1822 EKG shows sinus tachycardia with a rate of 101 bpm with no acute ST or T wave abnormalities suggestive of ischemia. Perfect Serve Consult for Admission  9:26 PM    ED Room Number: ER15/15  Patient Name and age:  Laura Bardales 65 y.o.  female  Working Diagnosis:   1. Sepsis with acute renal failure without septic shock, due to unspecified organism, unspecified acute renal failure type ([address]    2. ALBINA (acute kidney injury) ([address]    3. Elevated troponin    4. Leukocytosis, unspecified type    5. Nausea and vomiting in adult    6. Suprapubic abdominal pain    7.  Pyelonephritis COVID-19 Suspicion:  no  Sepsis present:  yes  Reassessment needed: no  Code Status:  Full Code  Readmission: no  Isolation Requirements:  no  Recommended Level of Care:  step down  Department:[facility] ED - [phone]  Other: 76-year-old female with known kidney stones within her kidney presents with dysuria but no flank pain for the last couple of days and increased fever and confusion. Labs show leukocytosis and ALBINA and mildly elevated troponin, likely secondary to demand ischemia, but normal lactate. Stable blood pressure. CT scan shows no ureteral stones, only known renal stones. Awaiting UA. Given empiric IV Zosyn.

## 2022-09-06 NOTE — H&P
Hospitalist Admission Note    NAME: Silvino Linares   :  1945   MRN:  [id number]     Date/Time:  2022 11:17 PM    Patient PCP: Kathy Vivar MD  ________________________________________________________________________    Given the patient's current clinical presentation, I have a high level of concern for decompensation if discharged from the emergency department. Complex decision making was performed, which includes reviewing the patient's available past medical records, laboratory results, and x-ray films. My assessment of this patient's clinical condition and my plan of care is as follows. Assessment / Plan:    #   Chronic cystitis w/ acute UTI:    The patient comes in w/ hx of recurrent UTI and chronic cystitis w/ clinical symptoms concerning for recurrent event. VSS  WBC 18.3, Hg 13.9  Na 135, K 3.4, CO2 20, BUN 44, Cr 1.82 (no recent baseline to compare w/)  hsTrop 334  COVID 19 negative  UA + large LE/+nitrite, WBC >100, 4+ bacteria, few epithelial    Admit and cont to monitor  Cont w/ IV Zosyn and cont to monitor    2. Elevated Troponin:    Secondary to UTI. She has no active symptoms of chest pain  No current EKG in M[facility]    Obtain EKG and repeat hsTrop  Cont to monitor    3. CKD:    Suspect CKD w/ no recent Cr to compare to. Cont w/ IVF and re-evaluate BMP/Cr    4. HTN:    Cont w/ Norvasc 5mg daily and Lisinopril 20mg daily    5. Anxiety/Depression:    Cont w/ Zoloft 100mg daily  Cont w/ Xanax 0.5mg qhs    6. GERD:    Cont w/ Prilosec 20mg daily    I have personally reviewed the radiographs, laboratory data in Epic and decisions and statements above are based partially on this personal interpretation.     Code Status: DNR/DNI  DVT Prophylaxis: Lovenox  GI Prophylaxis: not indicated  Surrogate Decision Maker  [facility] ()  [phone]       Subjective:   CHIEF COMPLAINT: \"I feel terrible\"    HISTORY OF PRESENT ILLNESS:       The patient is a 66 y/o C F w/ PMH anxiety/depression and recurrent UTI who is brought in today as she feels horrible for the past two days. She notes of increased night sweats, chills, nausea and vomiting which are usually consistent w/ her long-standing hx of UTI. She notes of dysuria x10 days w/o any hematuria. She denies any flank pain and her abdominal pain has been chronic w/o any acute issues. Today she has been unable to ambulate which results when she has severe UTI. She denies any fever, chest pain, palpitations, cough, wheezing or dyspnea. Her most recent colonoscopy was 6 weeks ago w/ Dr. Tessy Aden. PMH:    Anxiety/Depression  Recurrent UTI  HTN  HLD    PSxH:    Reviewed and no surgical hx    Social Hx:    Negative for smoking, alcohol or illicit drug use    FH:    M - HTN    Allergies   Allergen Reactions    Flagyl [Metronidazole] Nausea Only    Macrobid [Nitrofurantoin Monohyd/M-Cryst] Nausea Only    Sulfa (Sulfonamide Antibiotics) Rash        Prior to Admission medications    Medication Sig Start Date End Date Taking? Authorizing Provider   lisinopril (PRINIVIL, ZESTRIL) 20 mg tablet Take  by mouth daily. Yes Provider, Historical   amLODIPine (NORVASC) 5 mg tablet Take 5 mg by mouth daily. Yes Provider, Historical   omeprazole (PRILOSEC) 20 mg capsule Take 20 mg by mouth daily. Yes Provider, Historical   metoclopramide HCl (REGLAN) 5 mg tablet Take 5 mg by mouth Before breakfast, lunch, and dinner. Yes Provider, Historical   ALPRAZolam (XANAX) 0.5 mg tablet Take  by mouth. Yes Provider, Historical   sertraline (ZOLOFT) 100 mg tablet Take  by mouth daily.    Yes Provider, Historical   ondansetron (ZOFRAN ODT) 4 mg disintegrating tablet  1/9/17   Provider, Historical     REVIEW OF SYSTEMS:  See HPI for details  General: negative for fever, +chills, +sweats, +weakness, weight loss  Eyes: negative for blurred vision, eye pain, loss of vision, diplopia  Ear Nose and Throat: negative for rhinorrhea, pharyngitis, otalgia, tinnitus, speech or swallowing difficulties  Respiratory:  negative for pleuritic pain, cough, sputum production, wheezing, SOB, BUTLER  Cardiology:  negative for chest pain, palpitations, orthopnea, PND, edema, syncope   Gastrointestinal: +abdominal pain (chronic), +N/+V, dysphagia, change in bowel habits, bleeding, +constipation (chronic), +diarrhea (chronic)  Genitourinary: negative for frequency, urgency, +dysuria, hematuria, incontinence  Muskuloskeletal : negative for arthralgia, myalgia  Hematology: negative for easy bruising, bleeding, lymphadenopathy  Dermatological: negative for rash, ulceration, mole change, new lesion  Endocrine: negative for hot flashes or polydipsia  Neurological: negative for headache, dizziness, confusion, focal weakness, paresthesia, memory loss, +gait disturbance  Psychological: +anxiety, +depression, agitation      Objective:   VITALS:    Visit Vitals  BP 97/63   Pulse 85   Temp 99.7 °F (37.6 °C)   Resp 15   Ht 5' 4\" (1.626 m)   Wt 64.4 kg (142 lb)   SpO2 94%   BMI 24.37 kg/m²     PHYSICAL EXAM:    Physical Exam:    Gen: Well-developed, well-nourished, in no acute distress  HEENT:  Pink conjunctivae, PERRL, hearing intact to voice, moist mucous membranes  Neck: Supple, without masses, thyroid non-tender  Resp: No accessory muscle use, clear breath sounds without wheezes rales or rhonchi  Card: No murmurs, normal S1, S2 without thrills, bruits or peripheral edema  Abd:  Soft, non-tender, non-distended, normoactive bowel sounds are present, no palpable organomegaly and no detectable hernias  Lymph:  No cervical or inguinal adenopathy  Musc: No cyanosis or clubbing  Skin: No rashes or ulcers, skin turgor is good  Neuro:  Cranial nerves are grossly intact, no focal motor weakness, follows commands appropriately  Psych:  oriented to person, place and time (year and month, not current president)X, alert          _______________________________________________________________________  Care Plan discussed with:    Comments   Patient  Discussed with patient in room. POC outlined and Questions answered    Family      RN x    Care Manager                    Consultant:  neris HEWITT MD   _______________________________________________________________________  Recommended Disposition:   Home with Family X   HH/PT/OT/RN    SNF/LTC    [facility]    ________________________________________________________________________  TOTAL TIME:        Comments   >50% of visit spent in counseling and coordination of care  Chart reviewed  Discussion with patient and/or family and questions answered     ________________________________________________________________________  Signed: Ema Saldivar MD        Procedures: see electronic medical records for all procedures/Xrays and details which were not copied into this note but were reviewed prior to creation of Plan. LAB DATA REVIEWED:    Recent Results (from the past 24 hour(s))   CBC WITH AUTOMATED DIFF    Collection Time: 09/05/22  5:59 PM   Result Value Ref Range    WBC 18.3 (H) 3.6 - 11.0 K/uL    RBC 4.37 3.80 - 5.20 M/uL    HGB 13.9 11.5 - 16.0 g/dL    HCT 39.2 35.0 - 47.0 %    MCV 89.7 80.0 - 99.0 FL    MCH 31.8 26.0 - 34.0 PG    MCHC 35.5 30.0 - 36.5 g/dL    RDW 13.5 11.5 - 14.5 %    PLATELET 82 (L) 317 - 400 K/uL    MPV 10.8 8.9 - 12.9 FL    NRBC 0.0 0  WBC    ABSOLUTE NRBC 0.00 0.00 - 0.01 K/uL    NEUTROPHILS 91 (H) 32 - 75 %    LYMPHOCYTES 3 (L) 12 - 49 %    MONOCYTES 5 5 - 13 %    EOSINOPHILS 0 0 - 7 %    BASOPHILS 0 0 - 1 %    IMMATURE GRANULOCYTES 1 (H) 0.0 - 0.5 %    ABS. NEUTROPHILS 16.7 (H) 1.8 - 8.0 K/UL    ABS. LYMPHOCYTES 0.5 (L) 0.8 - 3.5 K/UL    ABS. MONOCYTES 0.9 0.0 - 1.0 K/UL    ABS. EOSINOPHILS 0.0 0.0 - 0.4 K/UL    ABS. BASOPHILS 0.0 0.0 - 0.1 K/UL    ABS. IMM.  GRANS. 0.2 (H) 0.00 - 0.04 K/UL    DF AUTOMATED      RBC COMMENTS NORMOCYTIC, NORMOCHROMIC      WBC COMMENTS VACUOLATED POLYS     METABOLIC PANEL, COMPREHENSIVE    Collection Time: 09/05/22  5:59 PM   Result Value Ref Range    Sodium 135 (L) 136 - 145 mmol/L    Potassium 3.4 (L) 3.5 - 5.1 mmol/L    Chloride 104 97 - 108 mmol/L    CO2 20 (L) 21 - 32 mmol/L    Anion gap 11 5 - 15 mmol/L    Glucose 187 (H) 65 - 100 mg/dL    BUN 44 (H) 6 - 20 MG/DL    Creatinine 1.82 (H) 0.55 - 1.02 MG/DL    BUN/Creatinine ratio 24 (H) 12 - 20      GFR est AA 33 (L) >60 ml/min/1.73m2    GFR est non-AA 27 (L) >60 ml/min/1.73m2    Calcium 8.7 8.5 - 10.1 MG/DL    Bilirubin, total 1.5 (H) 0.2 - 1.0 MG/DL    ALT (SGPT) 22 12 - 78 U/L    AST (SGOT) 20 15 - 37 U/L    Alk. phosphatase 79 45 - 117 U/L    Protein, total 8.3 (H) 6.4 - 8.2 g/dL    Albumin 3.7 3.5 - 5.0 g/dL    Globulin 4.6 (H) 2.0 - 4.0 g/dL    A-G Ratio 0.8 (L) 1.1 - 2.2     LIPASE    Collection Time: 09/05/22  5:59 PM   Result Value Ref Range    Lipase 44 (L) 73 - 393 U/L   SAMPLES BEING HELD    Collection Time: 09/05/22  5:59 PM   Result Value Ref Range    SAMPLES BEING HELD  1DK GRN, 1SST, 1RED, 1BLU     COMMENT        Add-on orders for these samples will be processed based on acceptable specimen integrity and analyte stability, which may vary by analyte.    TROPONIN-HIGH SENSITIVITY    Collection Time: 09/05/22  5:59 PM   Result Value Ref Range    Troponin-High Sensitivity 334 (HH) 0 - 51 ng/L   MAGNESIUM    Collection Time: 09/05/22  5:59 PM   Result Value Ref Range    Magnesium 1.7 1.6 - 2.4 mg/dL   PROCALCITONIN    Collection Time: 09/05/22  5:59 PM   Result Value Ref Range    Procalcitonin 155.58 ng/mL   COVID-19 RAPID TEST    Collection Time: 09/05/22  6:36 PM   Result Value Ref Range    Specimen source Nasopharyngeal      COVID-19 rapid test Not detected NOTD     INFLUENZA A+B VIRAL AGS    Collection Time: 09/05/22  6:36 PM   Result Value Ref Range    Influenza A Antigen Negative NEG      Influenza B Antigen Negative NEG     POC LACTIC ACID    Collection Time: 09/05/22  7:26 PM   Result Value Ref Range    Lactic Acid (POC) 1.77 0.40 - 2.00 mmol/L   URINALYSIS W/MICROSCOPIC    Collection Time: 09/05/22 10:21 PM   Result Value Ref Range    Color YELLOW/STRAW      Appearance TURBID (A) CLEAR      Specific gravity 1.020 1.003 - 1.030      pH (UA) 5.0 5.0 - 8.0      Protein 300 (A) NEG mg/dL    Glucose Negative NEG mg/dL    Ketone TRACE (A) NEG mg/dL    Bilirubin Negative NEG      Blood LARGE (A) NEG      Urobilinogen 1.0 0.2 - 1.0 EU/dL    Nitrites Positive (A) NEG      Leukocyte Esterase LARGE (A) NEG      WBC >100 (H) 0 - 4 /hpf    RBC 5-10 0 - 5 /hpf    Epithelial cells FEW FEW /lpf    Bacteria 4+ (A) NEG /hpf    Hyaline cast 10-20 0 - 5 /lpf   URINE CULTURE HOLD SAMPLE    Collection Time: 09/05/22 10:21 PM    Specimen: Serum; Urine   Result Value Ref Range    Urine culture hold        Urine on hold in Microbiology dept for 2 days. If unpreserved urine is submitted, it cannot be used for addtional testing after 24 hours, recollection will be required.

## 2022-09-06 NOTE — ACP (ADVANCE CARE PLANNING)
Advance Care Planning   Advance Care Planning Inpatient Note  [facility]    Today's Date: 9/6/2022  Unit: [facility] POST SURG ORT 1    Received request from IDT member. Upon review of chart and communication with care team, patient's decision making abilities are not in question. Patient was/were present in the room during visit. Goals of ACP Conversation:  Discuss Advance Care planning documents  Facilitate a discussion related to patient's goals of care as they align with the patient's values and beliefs    Health Care Decision Makers:      Primary Decision Maker: Adrian Negrete - [phone]    Secondary Decision Maker: Julia Wu - [phone]    Summary:  [address] -  [address] (Patient Wishes) on file:  None     Assessment:    In-basket request / spiritual assessment in 414. Ms Cheryl Jules was lying in her bed resting upon 's arrival. She shared that she was not feeling her best, but still was open for a visit. Ms Cheryl Jules is originally from [place] [employer]. She is  and has a son. She is a retired [employer] . Ms Cheryl Jules identifies as Joseph Landry, but she is not affiliated with a araceli organization. At this time, Ms Cheryl Jules was not interested in completing an advance directive, however she verified her spouse Nadine Vickers as her primary decision maker and her son Ruiz Laws as her secondary decision maker. Information updated in her medical records. Chart reviewed. Facilitated advanced directive discussion and verified primary and secondary healthcare decision makers; explored spiritual, emotional, and relational needs, provided ministry of presence, empathic listening and cultivated a relationship of care, compassion, and support.      Interventions:  Discussed and provided education on state decision maker hierarchy  Encouraged ongoing ACP conversation with future decision makers and loved ones    Care Preferences Communicated:  No    Outcomes/Plan:  ACP Discussion Completed    Rev.  Lois Rinne, M.Div, [facility] on 9/6/2022 at 3:16 PM

## 2022-09-06 NOTE — ED NOTES
TRANSFER - OUT REPORT:    Verbal report given to Freddy Garsia RN (name) on Tobias Crews  being transferred to [address] [place] [address] (unit) for routine progression of care       Report consisted of patients Situation, Background, Assessment and   Recommendations(SBAR). Information from the following report(s) SBAR, ED Summary, MAR, Recent Results, Med Rec Status and Cardiac Rhythm NSR was reviewed with the receiving nurse. Lines:   Peripheral IV 09/05/22 Right Antecubital (Active)        Opportunity for questions and clarification was provided. Patient transported with:   Registered Nurse          Verbal report given to Freddy Garsia RN (name) on Tobias Crews being transferred to [address] [place] [address] (unit) for routine progression of care    Report consisted of patient's Situation, Background, Assessment and Recommendations (SBAR)    Information from the following report(s)  SBAR, ED Summary, MAR, Recent Results and Med Rec Status was reviewed with the receiving nurse. Opportunity for questions and clarification was provided.     Patient transported with:  Registered Nurse    Last Randene Coil Values:  Temp: 98.5 °F (36.9 °C) (09/06/22 0051)  Pulse (Heart Rate): 84 (09/06/22 0051)  Resp Rate: 20 (09/06/22 0051)  O2 Sat (%): 98 % (09/06/22 0051)  BP: (!) 123/59 (09/06/22 0051)  MAP (Monitor): 70 (09/06/22 0005)  MAP (Calculated): 80 (09/06/22 0051)  Level of Consciousness: Alert (0) (09/06/22 0051)      Lab Results   Component Value Date/Time    WBC 16.2 (H) 09/06/2022 12:15 AM    WBC 18.3 (H) 09/05/2022 05:59 PM       Repeat LA:  Time Due N/A    Blood Cultures Drawn:  yes    Fluid Resuscitation:  Total needed 2,000, Status infusing    All Antibiotics Started:  yes, Dose Due 0200    VS x 2 post-fluid resuscitation:   yes    Vasopressor Infusion:  no N/A    Provider Reassessment needed and notified:  no , Due N/A    Additional Interventions/Comments:  N/A

## 2022-09-06 NOTE — PROGRESS NOTES
Problem: Pressure Injury - Risk of  Goal: *Prevention of pressure injury  Description: Document Asad Scale and appropriate interventions in the flowsheet. Outcome: Progressing Towards Goal  Note: Pressure Injury Interventions:       Moisture Interventions: Absorbent underpads, Internal/External urinary devices, Minimize layers    Activity Interventions: Increase time out of bed, Pressure redistribution bed/mattress(bed type), PT/OT evaluation    Mobility Interventions: HOB 30 degrees or less, Pressure redistribution bed/mattress (bed type), PT/OT evaluation    Nutrition Interventions: Document food/fluid/supplement intake                     Problem: Patient Education: Go to Patient Education Activity  Goal: Patient/Family Education  Outcome: Progressing Towards Goal     Problem: Falls - Risk of  Goal: *Absence of Falls  Description: Document Michael Fall Risk and appropriate interventions in the flowsheet.   Outcome: Progressing Towards Goal  Note: Fall Risk Interventions:  Mobility Interventions: Bed/chair exit alarm, Patient to call before getting OOB    Mentation Interventions: Adequate sleep, hydration, pain control, Bed/chair exit alarm    Medication Interventions: Patient to call before getting OOB, Teach patient to arise slowly    Elimination Interventions: Call light in reach, Bed/chair exit alarm, Toilet paper/wipes in reach    History of Falls Interventions: Bed/chair exit alarm         Problem: Patient Education: Go to Patient Education Activity  Goal: Patient/Family Education  Outcome: Progressing Towards Goal

## 2022-09-06 NOTE — PROGRESS NOTES
Spiritual Care Assessment/Progress Note  nvite      NAME: Mary Anne Jose      MRN: [id number]  AGE: 65 y.o.  SEX: female  Episcopalian Affiliation: Non Baptism   Language: English     9/6/2022     Total Time (in minutes): 31     Spiritual Assessment begun in [facility] POST SURG ORT 1 through conversation with:         [x]Patient        [] Family    [] Friend(s)        Reason for Consult: Initial/Spiritual assessment, patient floor, Advance medical directive consult     Spiritual beliefs: (Please include comment if needed)     [x] Identifies with a araceli tradition:  Non[employer]       [] Supported by a araceli community:            [] Claims no spiritual orientation:           [] Seeking spiritual identity:                [] Adheres to an individual form of spirituality:           [] Not able to assess:                           Identified resources for coping:      [] Prayer                               [] Music                  [] Guided Imagery     [x] Family/friends                 [] Pet visits     [] Devotional reading                         [] Unknown     [] Other:                                               Interventions offered during this visit: (See comments for more details)    Patient Interventions: Affirmation of emotions/emotional suffering, Coping skills reviewed/reinforced, Initial/Spiritual assessment, patient floor, Normalization of emotional/spiritual concerns, Life review/legacy, Affirmation of araceli, Initial visit, Advance medical directive consult           Plan of Care:     [x] Support spiritual and/or cultural needs    [] Support AMD and/or advance care planning process      [] Support grieving process   [] Coordinate Rites and/or Rituals    [] Coordination with community clergy   [] No spiritual needs identified at this time   [] Detailed Plan of Care below (See Comments)  [] Make referral to Music Therapy  [] Make referral to Pet Therapy     [] Make referral to Addiction services  [] Make referral to [facility]  [] Make referral to Spiritual Care Partner  [] No future visits requested        [] Contact Spiritual Care for further referrals     Comments: In-basket request / spiritual assessment in 414. Ms Rin Monson was lying in her bed resting upon 's arrival. She shared that she was not feeling her best, but still was open for a visit. Ms Rin Monson is originally from [place]. She is  and has a son. She is a retired [employer] . Ms Rin Monson identifies as Ana Zelaya, but she is not affiliated with a araceli organization. At this time, Ms Rin Monson was not interested in completing an advance directive, however she verified her spouse Dina Best as her primary decision maker and her son Mahin Monterroso as her secondary decision maker. Information updated in her medical records. Chart reviewed. Facilitated advanced directive discussion and verified primary and secondary healthcare decision makers; explored spiritual, emotional, and relational needs, provided ministry of presence, empathic listening and cultivated a relationship of care, compassion, and support. Visited by: Karena Perez.  Claudette Taylor, [address] [place] [address] Road paging Service [phone]

## 2022-09-06 NOTE — PROGRESS NOTES
[facility]  [address], [facility] [address]  [phone]         Hospitalist Progress Note        NAME:  Lory Gong   :  1945   MRN:  [id number]    Date/Time:  2022     Patient PCP:  America Buitrago MD    Emergency Contact:    Extended Emergency Contact Information  Primary Emergency Contact: KarelPiotr  Address: [address] [facility] [address]           [address], [address] [place] [address] Street [address] [place] [address] Phone: [phone]  Relation: Spouse  Secondary Emergency Contact: [address]  Mobile Phone: [phone]  Relation: Son  Preferred language: ENGLISH      Code: DNR     Isolation Precautions: There are currently no Active Isolations        Subjective:     REASON FOR VISIT:  Recheck UTI / Bacteremia     HPI & INTERVAL HISTORY:     Ms. Alexandria Ng is a 65 y.o. female with history that includes recurrent cystitits, anxiety/depression, and HTN presented with dysuria x 10 days, chills and N/V. Found to have UTI and met criteria for sepsis based on HR, Temp, WBC, and UTI. Started on ABXs. GNR growing in blood culture. : Patient was seen and examined. She reports that after starting antibiotic she is feeling better. Has not had any further episodes of fevers or chills. Has been able to keep down her food as she is no longer nauseated and no longer vomiting. Blood cultures growing gram-negative rods.       ALLERGIES  Allergies   Allergen Reactions    Flagyl [Metronidazole] Nausea Only    Macrobid [Nitrofurantoin Monohyd/M-Cryst] Nausea Only    Sulfa (Sulfonamide Antibiotics) Rash         ROS:  Gen:   Negative and chills but no fever  Eyes:  negative  ENT:    negative  Resp:  negative  CVS:   negative  GI:       negative and nausea vomiting resolved  :     dysuria           Objective:      Visit Vitals  /60 (BP 1 Location: Left upper arm, BP Patient Position: At rest)   Pulse 94   Temp 99.3 °F (37.4 °C)   Resp 23   Ht 5' 4\" (1.626 m)   Wt 64.4 kg (142 lb)   SpO2 97%   BMI 24.37 kg/m²       Physical Exam:  General: alert, well appearing, and no distress  Head: Normocephalic, without obvious abnormality, atraumatic  Eyes: PERRL, EOMI, anicteric sclerae, and conjuntiva clear  ENT: lips, mucosa, and tongue normal  Neck: normal, supple, and no tenderness  Lungs: clear to auscultation with good breath sounds and normal respiratory effort  Heart: S1, S2 normal, regular rate, and regular rhythm  Abd: not distended, soft, nontender, BS present and normactive  Ext: no cyanosis and no edema  Skin: normal skin color, no rashes, and texture normal  Neuro:  alert, oriented x 3, no defects noted in general exam.  Psych: not anxious, cooperative, appropriate affect      Medications:  Current Facility-Administered Medications   Medication Dose Route Frequency    loperamide (IMODIUM) capsule 2 mg  2 mg Oral Q4H PRN    sodium chloride (NS) flush 5-10 mL  5-10 mL IntraVENous PRN    piperacillin-tazobactam (ZOSYN) 3.375 g in 0.9% sodium chloride (MBP/ADV) 100 mL MBP  3.375 g IntraVENous Q8H    ALPRAZolam (XANAX) tablet 0.5 mg  0.5 mg Oral QHS PRN    amLODIPine (NORVASC) tablet 5 mg  5 mg Oral DAILY    lisinopriL (PRINIVIL, ZESTRIL) tablet 5 mg  5 mg Oral DAILY    pantoprazole (PROTONIX) tablet 40 mg  40 mg Oral ACB    sertraline (ZOLOFT) tablet 100 mg  100 mg Oral DAILY    lactated Ringers infusion  125 mL/hr IntraVENous CONTINUOUS    sodium chloride (NS) flush 5-40 mL  5-40 mL IntraVENous Q8H    sodium chloride (NS) flush 5-40 mL  5-40 mL IntraVENous PRN    acetaminophen (TYLENOL) tablet 650 mg  650 mg Oral Q6H PRN    Or    acetaminophen (TYLENOL) suppository 650 mg  650 mg Rectal Q6H PRN    polyethylene glycol (MIRALAX) packet 17 g  17 g Oral DAILY PRN    ondansetron (ZOFRAN ODT) tablet 4 mg  4 mg Oral Q8H PRN    Or    ondansetron (ZOFRAN) injection 4 mg  4 mg IntraVENous Q6H PRN    enoxaparin (LOVENOX) injection 30 mg  30 mg SubCUTAneous DAILY        Labs:  Recent Labs 09/06/22  0015   WBC 16.2*   HGB 12.4   HCT 35.2   PLT 65*     Recent Labs     09/06/22  0015 09/05/22  1759    135*   K 3.0* 3.4*    104   CO2 22 20*   * 187*   BUN 43* 44*   CREA 1.59* 1.82*   CA 8.1* 8.7   MG  --  1.7   ALB  --  3.7   TBILI  --  1.5*   ALT  --  22       Radiology:  CT HEAD WO CONT    Result Date: 9/5/2022  No acute intracranial abnormality. CT ABD PELV WO CONT    Result Date: 9/5/2022  1. Multifocal areas of pulmonary groundglass opacification and patchy consolidation in medial segment of right middle lobe for which clinical correlation is recommended as above. 2. Hepatosplenomegaly. 3. Several tiny dependent gallstones. Mild gallbladder distention. 3. 2 nonobstructing calculi in the inferior pole the right kidney measuring 14 mm and 9 mm. 4. Right perinephric fat stranding and mild right renal pelvocaliectasis with abrupt transition at UPJ. The ureters distal to this point is of normal caliber. There is a 4 mm calcification in the pelvis approximately 2 cm proximal to the UVJ which might represent a retained renal calculus. 5. Diffuse colonic diverticulosis. No diverticulitis demonstrated. Rectosigmoid anastomosis is noted. 6. Chronic appearing moderate to severe T12 vertebral compression fracture. XR CHEST PORT    Result Date: 9/5/2022  No acute findings.    I personally reviewed and interpreted the imaging studies and agree with official reading    The labs, imaging studies, and medications was reviewed by me on: September 6, 2022         Assessment/Plan:      Acute GNR sepsis due to UTI with organ dysfunction (ALBINA) POA:  Continue with Zosyn day 2  BCX growing GNR in 3/4 bottles  UCX ordered  Echocardiogram for bacteremia  Monitor labs  Consult ID    Elevated Troponin:  Unsure of cause possible UTI otherwise asymptomatic  Repeat troponins have been flat with no symptoms we will obtain echocardiogram and consider cardiology consult but given the findings do not think will be necessary at this time. ALBINA vs ALBINA on CKD vs CKD:  Baseline unknown but last labs in chart 2016 wnL  IVFs. Avoid nephrotoxic agents whenever possible.    I&Os. Labs: Chemistry, MAG, PO4, A1C, UA, Intact PTH, Vit.  D 1.25, and Erythropoeitin    HTN:  Continue norvasc and hold lisinopril for now until further delineation of her renal status     Anxiety/Depression:  Cont w/ Zoloft 100mg daily  Cont w/ Xanax 0.5mg qhs     GERD:  Continue PPI    Body mass index is 24.37 kg/m².:  18.5 - 24.9:  Normal weight      Discussed:  Pt's condition, Imaging findings, Lab findings, Assessment, and Care Plan discussed with: Patient, RN, Specialist, and Care Manager    Prophylaxis:  Hep SQ    Probable disposition:  Home with family      Total time: 50 minutes **I personally saw and examined the patient during this time period**      Date of service:    9/6/2022                ___________________________________________________    Admitting Physician: Silvia Jackson MD

## 2022-09-06 NOTE — PROGRESS NOTES
0106: Perfectserved Dr. Radha Barry. Patient requested medication for diarrhea. Informed physician about troponin increasing from 334 to 362. No new orders at this time. Imodium was ordered for diarrhea.

## 2022-09-06 NOTE — CONSULTS
Infectious Disease Consult    Impression/Plan   GNR bacteremia. Likely E. coli based on preliminary culture results. Suspect urinary tract as source of infection. Urine culture has been requested from sample in lab. Recommend urology consultation with abnormal CT and ultrasound findings. Agree with piperacillin-tazobactam.  We will send repeat blood cultures in a.m. to document sterilization of the blood  Leukocytosis. Due to above. Follow trend  Elevated creatinine. Improving. Baseline unclear. Creatinine and April 2016 was 0.9  History of sulfa allergy. This caused a rash. Metronidazole and nitrofurantoin cause nausea  Elevated troponin. Follow    Anti-infectives:   Piperacillin-tazobactam    Subjective:   Date of Consultation:  September 6, 2022  Date of Admission: 9/5/2022   Referring Physician:     Patient is a 65 y.o. female with a past medical history significant for recurrent urinary tract infections followed by [facility], depression, and hypertension who is being seen for bacteremia. Please note the patient is a poor historian. At the time of examination her only complaint to me is fevers for several days. Per admission H&P: \"She notes of increased night sweats, chills, nausea and vomiting which are usually consistent w/ her long-standing hx of UTI. She notes of dysuria x10 days w/o any hematuria. She denies any flank pain and her abdominal pain has been chronic w/o any acute issues. Today she has been unable to ambulate which results when she has severe UTI. She denies any fever, chest pain, palpitations, cough, wheezing or dyspnea\"    Work-up at the time of admission revealed a fever of 100.6 °F and a white count of 18,000. UA reveals pyuria and blood cultures have returned growing GNR's. She has been started on piperacillin-tazobactam.  The infectious diseases service has been asked to assist with antibiotic management.     Patient Active Problem List   Diagnosis Code Elevated liver enzymes R74.8    Frequent UTI N39.0    Depression F32. A    Hypertension I10    IBS (irritable bowel syndrome) K58.9    S/P partial colectomy Z90.49    UTI (urinary tract infection) N39.0     No past medical history on file. No family history on file. Social History     Tobacco Use    Smoking status: Never    Smokeless tobacco: Never   Substance Use Topics    Alcohol use: No     No past surgical history on file. Prior to Admission medications    Medication Sig Start Date End Date Taking? Authorizing Provider   ondansetron (ZOFRAN ODT) 4 mg disintegrating tablet  17  Yes Provider, Historical   lisinopril (PRINIVIL, ZESTRIL) 20 mg tablet Take  by mouth daily. Yes Provider, Historical   amLODIPine (NORVASC) 5 mg tablet Take 5 mg by mouth daily. Yes Provider, Historical   omeprazole (PRILOSEC) 20 mg capsule Take 20 mg by mouth daily. Yes Provider, Historical   metoclopramide HCl (REGLAN) 5 mg tablet Take 5 mg by mouth Before breakfast, lunch, and dinner. Yes Provider, Historical   ALPRAZolam (XANAX) 0.5 mg tablet Take  by mouth. Yes Provider, Historical   sertraline (ZOLOFT) 100 mg tablet Take  by mouth daily. Yes Provider, Historical     Allergies   Allergen Reactions    Flagyl [Metronidazole] Nausea Only    Macrobid [Nitrofurantoin Monohyd/M-Cryst] Nausea Only    Sulfa (Sulfonamide Antibiotics) Rash        Review of Systems:  A comprehensive review of systems was negative except for that written in the History of Present Illness. Objective:   Blood pressure 121/69, pulse 91, temperature 99.7 °F (37.6 °C), resp. rate 16, height 5' 4\" (1.626 m), weight 142 lb (64.4 kg), SpO2 93 %. Temp (24hrs), Av.3 °F (37.4 °C), Min:98.5 °F (36.9 °C), Max:100.6 °F (38.1 °C)       Exam:    General:  Alert, cooperative. Pleasantly confused   Eyes:  Sclera anicteric. Mouth/Throat: Mucous membranes normal   Neck: Supple   Lungs:   Clear to auscultation anteriorly   CV:  Regular rate rhythm. 3/6 MARGARET    Abdomen:   Soft, nondistended. Mild tenderness to palpation over bladder   Extremities: Moves all   Skin: No acute rash on exposed skin   Lymph nodes:    Musculoskeletal:    Lines/Devices:  Intact, no erythema, drainage or tenderness   Psych:        Data Review:   Recent Results (from the past 24 hour(s))   CBC WITH AUTOMATED DIFF    Collection Time: 09/05/22  5:59 PM   Result Value Ref Range    WBC 18.3 (H) 3.6 - 11.0 K/uL    RBC 4.37 3.80 - 5.20 M/uL    HGB 13.9 11.5 - 16.0 g/dL    HCT 39.2 35.0 - 47.0 %    MCV 89.7 80.0 - 99.0 FL    MCH 31.8 26.0 - 34.0 PG    MCHC 35.5 30.0 - 36.5 g/dL    RDW 13.5 11.5 - 14.5 %    PLATELET 82 (L) 226 - 400 K/uL    MPV 10.8 8.9 - 12.9 FL    NRBC 0.0 0  WBC    ABSOLUTE NRBC 0.00 0.00 - 0.01 K/uL    NEUTROPHILS 91 (H) 32 - 75 %    LYMPHOCYTES 3 (L) 12 - 49 %    MONOCYTES 5 5 - 13 %    EOSINOPHILS 0 0 - 7 %    BASOPHILS 0 0 - 1 %    IMMATURE GRANULOCYTES 1 (H) 0.0 - 0.5 %    ABS. NEUTROPHILS 16.7 (H) 1.8 - 8.0 K/UL    ABS. LYMPHOCYTES 0.5 (L) 0.8 - 3.5 K/UL    ABS. MONOCYTES 0.9 0.0 - 1.0 K/UL    ABS. EOSINOPHILS 0.0 0.0 - 0.4 K/UL    ABS. BASOPHILS 0.0 0.0 - 0.1 K/UL    ABS. IMM. GRANS. 0.2 (H) 0.00 - 0.04 K/UL    DF AUTOMATED      RBC COMMENTS NORMOCYTIC, NORMOCHROMIC      WBC COMMENTS VACUOLATED POLYS     METABOLIC PANEL, COMPREHENSIVE    Collection Time: 09/05/22  5:59 PM   Result Value Ref Range    Sodium 135 (L) 136 - 145 mmol/L    Potassium 3.4 (L) 3.5 - 5.1 mmol/L    Chloride 104 97 - 108 mmol/L    CO2 20 (L) 21 - 32 mmol/L    Anion gap 11 5 - 15 mmol/L    Glucose 187 (H) 65 - 100 mg/dL    BUN 44 (H) 6 - 20 MG/DL    Creatinine 1.82 (H) 0.55 - 1.02 MG/DL    BUN/Creatinine ratio 24 (H) 12 - 20      GFR est AA 33 (L) >60 ml/min/1.73m2    GFR est non-AA 27 (L) >60 ml/min/1.73m2    Calcium 8.7 8.5 - 10.1 MG/DL    Bilirubin, total 1.5 (H) 0.2 - 1.0 MG/DL    ALT (SGPT) 22 12 - 78 U/L    AST (SGOT) 20 15 - 37 U/L    Alk.  phosphatase 79 45 - 117 U/L    Protein, total 8.3 (H) 6.4 - 8.2 g/dL    Albumin 3.7 3.5 - 5.0 g/dL    Globulin 4.6 (H) 2.0 - 4.0 g/dL    A-G Ratio 0.8 (L) 1.1 - 2.2     LIPASE    Collection Time: 09/05/22  5:59 PM   Result Value Ref Range    Lipase 44 (L) 73 - 393 U/L   SAMPLES BEING HELD    Collection Time: 09/05/22  5:59 PM   Result Value Ref Range    SAMPLES BEING HELD  1DK GRN, 1SST, 1RED, 1BLU     COMMENT        Add-on orders for these samples will be processed based on acceptable specimen integrity and analyte stability, which may vary by analyte. CULTURE, BLOOD    Collection Time: 09/05/22  5:59 PM    Specimen: Blood   Result Value Ref Range    Special Requests: NO SPECIAL REQUESTS      Culture result: (A)       PROBABLE ESCHERICHIA COLI GROWING IN BOTH BOTTLES DRAWN (SITE = [facility])    Culture result:       (NOTE) GNR CALLED TO JOSHUA ADLER, AT 1120 ON 9/6/22. RF   TROPONIN-HIGH SENSITIVITY    Collection Time: 09/05/22  5:59 PM   Result Value Ref Range    Troponin-High Sensitivity 334 (HH) 0 - 51 ng/L   MAGNESIUM    Collection Time: 09/05/22  5:59 PM   Result Value Ref Range    Magnesium 1.7 1.6 - 2.4 mg/dL   PROCALCITONIN    Collection Time: 09/05/22  5:59 PM   Result Value Ref Range    Procalcitonin 155.58 ng/mL   BLOOD CULTURE ID PANEL    Collection Time: 09/05/22  5:59 PM    Specimen: Blood   Result Value Ref Range    Acc. no. from Micro Order [id number]     Enterococcus faecalis Not detected NOTD      Enterococcus faecium Not detected NOTD      Listeria monocytogenes Not detected NOTD      Staphylococcus Not detected NOTD      Staphylococcus aureus Not detected NOTD      Staph epidermidis Not detected NOTD      Staph lugdunensis Not detected NOTD      Streptococcus Not detected NOTD      Streptococcus agalactiae (Group B) Not detected NOTD      Streptococcus pneumoniae Not detected NOTD      Streptococcus pyogenes (Group A) Not detected NOTD      Acinetobacter calcoaceticus-baumanii complex Not detected NOTD      Bacteroides fragilis Not detected NOTD      Enterobacterales species Detected (A) NOTD      Enterobacter cloacae complex Not detected NOTD      Escherichia coli Detected (A) NOTD      Klebsiella aerogenes Not detected NOTD      Klebsiella oxytoca Not detected NOTD      Klebsiella pneumoniae group Not detected NOTD      Proteus Not detected NOTD      Salmonella Not detected NOTD      Serratia marcescens Not detected NOTD      Haemophilus influenzae Not detected NOTD      Neisseria meningitidis Not detected NOTD      Pseudomonas aeruginosa Not detected NOTD      Steno maltophilia Not detected NOTD      Candida albicans Not detected NOTD      Candida auris Not detected NOTD      Candida glabrata Not detected NOTD      Tiffani krusei Not detected NOTD      Candida parapsilosis Not detected NOTD      Candida tropicalis Not detected NOTD      Crypto neoformans/gattii Not detected NOTD      RESISTANT GENES:          CTX-M (ESBL resistant gene) Not detected NOTD      IMP (Carbapenemase resistant gene) Not detected NOTD      KPC (Carbapenem Resistance Gene) Not detected NOTD      mcr-1 (Colistin resistant gene) Not detected NOTD      NDM (Carbapenemase resistant gene) Not detected NOTD      OXA-48-like (Carbapenemase resistant gene) Not detected NOTD      VIM (Carbapenemase resistant gene) Not detected NOTD      Comment        False positive results may rarely occur.  Correlate with clinical,epidemiologic, and other laboratory findings   EKG, 12 LEAD, INITIAL    Collection Time: 09/05/22  6:22 PM   Result Value Ref Range    Ventricular Rate 101 BPM    Atrial Rate 101 BPM    P-R Interval 126 ms    QRS Duration 74 ms    Q-T Interval 334 ms    QTC Calculation (Bezet) 433 ms    Calculated P Axis 11 degrees    Calculated R Axis -29 degrees    Calculated T Axis 39 degrees    Diagnosis       Sinus tachycardia  Minimal voltage criteria for LVH, may be normal variant ( R in aVL )  Borderline ECG  When compared with ECG of 13-APR-2016 11:15,  No significant change was found  Confirmed by Anay Brito M.D., Meli Yanes ([id number]) on 9/6/2022 2:45:40 PM     COVID-19 RAPID TEST    Collection Time: 09/05/22  6:36 PM   Result Value Ref Range    Specimen source Nasopharyngeal      COVID-19 rapid test Not detected NOTD     INFLUENZA A+B VIRAL AGS    Collection Time: 09/05/22  6:36 PM   Result Value Ref Range    Influenza A Antigen Negative NEG      Influenza B Antigen Negative NEG     CULTURE, BLOOD    Collection Time: 09/05/22  6:40 PM    Specimen: Blood   Result Value Ref Range    Special Requests: NO SPECIAL REQUESTS      Culture result: (A)       GRAM NEGATIVE RODS GROWING IN BOTH BOTTLES DRAWN (SITE = L ARM)   POC LACTIC ACID    Collection Time: 09/05/22  7:26 PM   Result Value Ref Range    Lactic Acid (POC) 1.77 0.40 - 2.00 mmol/L   URINALYSIS W/MICROSCOPIC    Collection Time: 09/05/22 10:21 PM   Result Value Ref Range    Color YELLOW/STRAW      Appearance TURBID (A) CLEAR      Specific gravity 1.020 1.003 - 1.030      pH (UA) 5.0 5.0 - 8.0      Protein 300 (A) NEG mg/dL    Glucose Negative NEG mg/dL    Ketone TRACE (A) NEG mg/dL    Bilirubin Negative NEG      Blood LARGE (A) NEG      Urobilinogen 1.0 0.2 - 1.0 EU/dL    Nitrites Positive (A) NEG      Leukocyte Esterase LARGE (A) NEG      WBC >100 (H) 0 - 4 /hpf    RBC 5-10 0 - 5 /hpf    Epithelial cells FEW FEW /lpf    Bacteria 4+ (A) NEG /hpf    Hyaline cast 10-20 0 - 5 /lpf   URINE CULTURE HOLD SAMPLE    Collection Time: 09/05/22 10:21 PM    Specimen: Serum; Urine   Result Value Ref Range    Urine culture hold        Urine on hold in Microbiology dept for 2 days. If unpreserved urine is submitted, it cannot be used for addtional testing after 24 hours, recollection will be required.    CBC W/O DIFF    Collection Time: 09/06/22 12:15 AM   Result Value Ref Range    WBC 16.2 (H) 3.6 - 11.0 K/uL    RBC 3.81 3.80 - 5.20 M/uL    HGB 12.4 11.5 - 16.0 g/dL    HCT 35.2 35.0 - 47.0 %    MCV 92.4 80.0 - 99.0 FL    MCH 32.5 26.0 - 34.0 PG    MCHC 35.2 30.0 - 36.5 g/dL    RDW 13.5 11.5 - 14.5 %    PLATELET 65 (L) 922 - 400 K/uL    MPV 11.2 8.9 - 12.9 FL    NRBC 0.0 0  WBC    ABSOLUTE NRBC 0.00 0.00 - 6.12 K/uL   METABOLIC PANEL, BASIC    Collection Time: 09/06/22 12:15 AM   Result Value Ref Range    Sodium 138 136 - 145 mmol/L    Potassium 3.0 (L) 3.5 - 5.1 mmol/L    Chloride 107 97 - 108 mmol/L    CO2 22 21 - 32 mmol/L    Anion gap 9 5 - 15 mmol/L    Glucose 149 (H) 65 - 100 mg/dL    BUN 43 (H) 6 - 20 MG/DL    Creatinine 1.59 (H) 0.55 - 1.02 MG/DL    BUN/Creatinine ratio 27 (H) 12 - 20      GFR est AA 38 (L) >60 ml/min/1.73m2    GFR est non-AA 31 (L) >60 ml/min/1.73m2    Calcium 8.1 (L) 8.5 - 10.1 MG/DL   TROPONIN-HIGH SENSITIVITY    Collection Time: 09/06/22 12:15 AM   Result Value Ref Range    Troponin-High Sensitivity 362 (HH) 0 - 51 ng/L   TROPONIN-HIGH SENSITIVITY    Collection Time: 09/06/22  9:04 AM   Result Value Ref Range    Troponin-High Sensitivity 317 (HH) 0 - 51 ng/L   HEMOGLOBIN A1C WITH EAG    Collection Time: 09/06/22  9:04 AM   Result Value Ref Range    Hemoglobin A1c 5.2 4.0 - 5.6 %    Est. average glucose 103 mg/dL   PHOSPHORUS    Collection Time: 09/06/22  9:04 AM   Result Value Ref Range    Phosphorus 2.2 (L) 2.6 - 4.7 MG/DL   MAGNESIUM    Collection Time: 09/06/22  9:04 AM   Result Value Ref Range    Magnesium 2.1 1.6 - 2.4 mg/dL   SODIUM, UR, RANDOM    Collection Time: 09/06/22 10:00 AM   Result Value Ref Range    Sodium,urine random 42 MMOL/L   CREATININE, UR, RANDOM    Collection Time: 09/06/22 10:00 AM   Result Value Ref Range    Creatinine, urine 88.00 mg/dL        Microbiology:      Studies:      Signed By: Sue Curiel DO     September 6, 2022

## 2022-09-07 ENCOUNTER — APPOINTMENT (OUTPATIENT)
Dept: NON INVASIVE DIAGNOSTICS | Age: 77
DRG: 872 | End: 2022-09-07
Attending: HOSPITALIST
Payer: MEDICARE

## 2022-09-07 LAB
1,25(OH)2D3 SERPL-MCNC: 56 PG/ML (ref 24.8–81.5)
ANION GAP SERPL CALC-SCNC: 7 MMOL/L (ref 5–15)
BASOPHILS # BLD: 0 K/UL (ref 0–0.1)
BASOPHILS NFR BLD: 0 % (ref 0–1)
BUN SERPL-MCNC: 23 MG/DL (ref 6–20)
BUN/CREAT SERPL: 25 (ref 12–20)
CALCIUM SERPL-MCNC: 8.5 MG/DL (ref 8.5–10.1)
CHLORIDE SERPL-SCNC: 108 MMOL/L (ref 97–108)
CO2 SERPL-SCNC: 22 MMOL/L (ref 21–32)
CREAT SERPL-MCNC: 0.92 MG/DL (ref 0.55–1.02)
DIFFERENTIAL METHOD BLD: ABNORMAL
ECHO AO ASC DIAM: 3.4 CM
ECHO AO ASCENDING AORTA INDEX: 2.09 CM/M2
ECHO AR MAX VEL PISA: 4.6 M/S
ECHO AV AREA PEAK VELOCITY: 1.3 CM2
ECHO AV AREA VTI: 1.5 CM2
ECHO AV AREA/BSA PEAK VELOCITY: 0.8 CM2/M2
ECHO AV AREA/BSA VTI: 0.9 CM2/M2
ECHO AV MEAN GRADIENT: 18 MMHG
ECHO AV MEAN VELOCITY: 2 M/S
ECHO AV PEAK GRADIENT: 32 MMHG
ECHO AV PEAK VELOCITY: 2.8 M/S
ECHO AV REGURGITANT PHT: 316.4 MILLISECOND
ECHO AV VELOCITY RATIO: 0.43
ECHO AV VTI: 55.3 CM
ECHO LA DIAMETER INDEX: 2.27 CM/M2
ECHO LA DIAMETER: 3.7 CM
ECHO LA VOL 2C: 39 ML (ref 22–52)
ECHO LA VOL 4C: 44 ML (ref 22–52)
ECHO LA VOL BP: 43 ML (ref 22–52)
ECHO LA VOL/BSA BIPLANE: 26 ML/M2 (ref 16–34)
ECHO LA VOLUME AREA LENGTH: 47 ML
ECHO LA VOLUME INDEX A2C: 24 ML/M2 (ref 16–34)
ECHO LA VOLUME INDEX A4C: 27 ML/M2 (ref 16–34)
ECHO LA VOLUME INDEX AREA LENGTH: 29 ML/M2 (ref 16–34)
ECHO LV E' LATERAL VELOCITY: 7 CM/S
ECHO LV E' SEPTAL VELOCITY: 9 CM/S
ECHO LV FRACTIONAL SHORTENING: 29 % (ref 28–44)
ECHO LV INTERNAL DIMENSION DIASTOLE INDEX: 2.52 CM/M2
ECHO LV INTERNAL DIMENSION DIASTOLIC: 4.1 CM (ref 3.9–5.3)
ECHO LV INTERNAL DIMENSION SYSTOLIC INDEX: 1.78 CM/M2
ECHO LV INTERNAL DIMENSION SYSTOLIC: 2.9 CM
ECHO LV IVSD: 1 CM (ref 0.6–0.9)
ECHO LV MASS 2D: 132.1 G (ref 67–162)
ECHO LV MASS INDEX 2D: 81 G/M2 (ref 43–95)
ECHO LV POSTERIOR WALL DIASTOLIC: 1 CM (ref 0.6–0.9)
ECHO LV RELATIVE WALL THICKNESS RATIO: 0.49
ECHO LVOT AREA: 3.1 CM2
ECHO LVOT AV VTI INDEX: 0.46
ECHO LVOT DIAM: 2 CM
ECHO LVOT MEAN GRADIENT: 3 MMHG
ECHO LVOT PEAK GRADIENT: 5 MMHG
ECHO LVOT PEAK VELOCITY: 1.2 M/S
ECHO LVOT STROKE VOLUME INDEX: 48.9 ML/M2
ECHO LVOT SV: 79.8 ML
ECHO LVOT VTI: 25.4 CM
ECHO MV A VELOCITY: 1.55 M/S
ECHO MV AREA VTI: 1.9 CM2
ECHO MV E DECELERATION TIME (DT): 247.4 MS
ECHO MV E VELOCITY: 1.19 M/S
ECHO MV E/A RATIO: 0.77
ECHO MV E/E' LATERAL: 17
ECHO MV E/E' RATIO (AVERAGED): 15.11
ECHO MV E/E' SEPTAL: 13.22
ECHO MV LVOT VTI INDEX: 1.65
ECHO MV MAX VELOCITY: 1.8 M/S
ECHO MV MEAN GRADIENT: 6 MMHG
ECHO MV MEAN VELOCITY: 1.2 M/S
ECHO MV PEAK GRADIENT: 13 MMHG
ECHO MV REGURGITANT PEAK GRADIENT: 117 MMHG
ECHO MV REGURGITANT PEAK VELOCITY: 5.4 M/S
ECHO MV VTI: 41.9 CM
ECHO PULMONARY ARTERY END DIASTOLIC PRESSURE: 7 MMHG
ECHO PV MAX VELOCITY: 1 M/S
ECHO PV PEAK GRADIENT: 4 MMHG
ECHO PV REGURGITANT MAX VELOCITY: 1.3 M/S
ECHO RV INTERNAL DIMENSION: 3.4 CM
ECHO RV TAPSE: 2.1 CM (ref 1.7–?)
ECHO RVOT PEAK GRADIENT: 2 MMHG
ECHO RVOT PEAK VELOCITY: 0.8 M/S
ECHO TV REGURGITANT MAX VELOCITY: 3 M/S
ECHO TV REGURGITANT PEAK GRADIENT: 36 MMHG
EOSINOPHIL # BLD: 0 K/UL (ref 0–0.4)
EOSINOPHIL NFR BLD: 0 % (ref 0–7)
EPO SERPL-ACNC: 19.3 MIU/ML (ref 2.6–18.5)
ERYTHROCYTE [DISTWIDTH] IN BLOOD BY AUTOMATED COUNT: 13.4 % (ref 11.5–14.5)
GLUCOSE SERPL-MCNC: 110 MG/DL (ref 65–100)
HCT VFR BLD AUTO: 35.6 % (ref 35–47)
HGB BLD-MCNC: 12.4 G/DL (ref 11.5–16)
IMM GRANULOCYTES # BLD AUTO: 0 K/UL
IMM GRANULOCYTES NFR BLD AUTO: 0 %
LYMPHOCYTES # BLD: 0.3 K/UL (ref 0.8–3.5)
LYMPHOCYTES NFR BLD: 3 % (ref 12–49)
MCH RBC QN AUTO: 31.7 PG (ref 26–34)
MCHC RBC AUTO-ENTMCNC: 34.8 G/DL (ref 30–36.5)
MCV RBC AUTO: 91 FL (ref 80–99)
MONOCYTES # BLD: 0.4 K/UL (ref 0–1)
MONOCYTES NFR BLD: 4 % (ref 5–13)
NEUTS BAND NFR BLD MANUAL: 3 % (ref 0–6)
NEUTS SEG # BLD: 9 K/UL (ref 1.8–8)
NEUTS SEG NFR BLD: 90 % (ref 32–75)
NRBC # BLD: 0 K/UL (ref 0–0.01)
NRBC BLD-RTO: 0 PER 100 WBC
PLATELET # BLD AUTO: 64 K/UL (ref 150–400)
PMV BLD AUTO: 11 FL (ref 8.9–12.9)
POTASSIUM SERPL-SCNC: 3.2 MMOL/L (ref 3.5–5.1)
RBC # BLD AUTO: 3.91 M/UL (ref 3.8–5.2)
RBC MORPH BLD: ABNORMAL
SODIUM SERPL-SCNC: 137 MMOL/L (ref 136–145)
WBC # BLD AUTO: 9.7 K/UL (ref 3.6–11)

## 2022-09-07 PROCEDURE — 74011250637 HC RX REV CODE- 250/637: Performed by: INTERNAL MEDICINE

## 2022-09-07 PROCEDURE — 74011250636 HC RX REV CODE- 250/636: Performed by: EMERGENCY MEDICINE

## 2022-09-07 PROCEDURE — 85025 COMPLETE CBC W/AUTO DIFF WBC: CPT

## 2022-09-07 PROCEDURE — 93306 TTE W/DOPPLER COMPLETE: CPT

## 2022-09-07 PROCEDURE — 74011250637 HC RX REV CODE- 250/637: Performed by: HOSPITALIST

## 2022-09-07 PROCEDURE — 93306 TTE W/DOPPLER COMPLETE: CPT | Performed by: INTERNAL MEDICINE

## 2022-09-07 PROCEDURE — 74011000258 HC RX REV CODE- 258: Performed by: EMERGENCY MEDICINE

## 2022-09-07 PROCEDURE — 99232 SBSQ HOSP IP/OBS MODERATE 35: CPT | Performed by: INTERNAL MEDICINE

## 2022-09-07 PROCEDURE — 80048 BASIC METABOLIC PNL TOTAL CA: CPT

## 2022-09-07 PROCEDURE — 87040 BLOOD CULTURE FOR BACTERIA: CPT

## 2022-09-07 PROCEDURE — 74011000250 HC RX REV CODE- 250: Performed by: INTERNAL MEDICINE

## 2022-09-07 PROCEDURE — 74011250636 HC RX REV CODE- 250/636: Performed by: HOSPITALIST

## 2022-09-07 PROCEDURE — 65270000029 HC RM PRIVATE

## 2022-09-07 PROCEDURE — 36415 COLL VENOUS BLD VENIPUNCTURE: CPT

## 2022-09-07 RX ORDER — LISINOPRIL 20 MG/1
20 TABLET ORAL DAILY
Status: DISCONTINUED | OUTPATIENT
Start: 2022-09-07 | End: 2022-09-19 | Stop reason: HOSPADM

## 2022-09-07 RX ORDER — POTASSIUM CHLORIDE 750 MG/1
40 TABLET, FILM COATED, EXTENDED RELEASE ORAL
Status: COMPLETED | OUTPATIENT
Start: 2022-09-07 | End: 2022-09-07

## 2022-09-07 RX ADMIN — LOPERAMIDE HYDROCHLORIDE 2 MG: 2 CAPSULE ORAL at 09:23

## 2022-09-07 RX ADMIN — HEPARIN SODIUM 5000 UNITS: 5000 INJECTION INTRAVENOUS; SUBCUTANEOUS at 06:33

## 2022-09-07 RX ADMIN — POTASSIUM CHLORIDE 40 MEQ: 750 TABLET, FILM COATED, EXTENDED RELEASE ORAL at 08:56

## 2022-09-07 RX ADMIN — PIPERACILLIN AND TAZOBACTAM 3.38 G: 3; .375 INJECTION, POWDER, FOR SOLUTION INTRAVENOUS at 11:29

## 2022-09-07 RX ADMIN — Medication 10 ML: at 22:41

## 2022-09-07 RX ADMIN — HEPARIN SODIUM 5000 UNITS: 5000 INJECTION INTRAVENOUS; SUBCUTANEOUS at 22:41

## 2022-09-07 RX ADMIN — PIPERACILLIN AND TAZOBACTAM 3.38 G: 3; .375 INJECTION, POWDER, FOR SOLUTION INTRAVENOUS at 17:46

## 2022-09-07 RX ADMIN — Medication 10 ML: at 06:34

## 2022-09-07 RX ADMIN — HEPARIN SODIUM 5000 UNITS: 5000 INJECTION INTRAVENOUS; SUBCUTANEOUS at 13:36

## 2022-09-07 RX ADMIN — POTASSIUM CHLORIDE 40 MEQ: 750 TABLET, FILM COATED, EXTENDED RELEASE ORAL at 11:29

## 2022-09-07 RX ADMIN — SERTRALINE HYDROCHLORIDE 100 MG: 50 TABLET ORAL at 08:56

## 2022-09-07 RX ADMIN — AMLODIPINE BESYLATE 5 MG: 5 TABLET ORAL at 08:56

## 2022-09-07 RX ADMIN — LISINOPRIL 20 MG: 20 TABLET ORAL at 08:56

## 2022-09-07 RX ADMIN — Medication 10 ML: at 13:36

## 2022-09-07 RX ADMIN — PANTOPRAZOLE SODIUM 40 MG: 40 TABLET, DELAYED RELEASE ORAL at 06:33

## 2022-09-07 RX ADMIN — PIPERACILLIN AND TAZOBACTAM 3.38 G: 3; .375 INJECTION, POWDER, FOR SOLUTION INTRAVENOUS at 02:30

## 2022-09-07 NOTE — PROGRESS NOTES
Transition of Care Plan - RUR 10%:    Patient continues to require medical management  Patient lives with spouse, is iADLs and ambulation at baseline        ID following  Urology consulted  CM will continue to follow and monitor for any d/c needs    Alva Fowler LCSW

## 2022-09-07 NOTE — PROGRESS NOTES
[facility] Infectious Disease Specialists Progress Note           Rupal Potter DO    [phone] Office  [phone]  Fax    2022      Assessment & Plan:     E. coli bacteremia. Suspect urinary tract as source of infection. Urine culture pending. Urology has been consulted for evaluation of abnormal CT and ultrasound findings. Continue piperacillin-tazobactam.  Repeat blood cultures sent today to document sterilization of the blood  Leukocytosis. Due to above. Follow trend  Elevated creatinine. Now within normal limits. Follow   History of sulfa allergy. This caused a rash. Metronidazole and nitrofurantoin cause nausea  Elevated troponin. Follow          Subjective:     No complaints. Getting TTE at time of examination    Objective:     Vitals: Visit Vitals  /82   Pulse 89   Temp 98.8 °F (37.1 °C)   Resp 16   Ht 5' 4\" (1.626 m)   Wt 131 lb (59.4 kg)   SpO2 95%   BMI 22.49 kg/m²        Tmax:  Temp (24hrs), Av °F (37.2 °C), Min:97.7 °F (36.5 °C), Max:100.4 °F (38 °C)      Exam:   Patient is intubated:  no    Physical Examination:   General:  Alert, cooperative, no distress   Head:  Normocephalic, atraumatic. Eyes:  Conjunctivae clear   Neck: Supple       Lungs:   No distress. Chest wall:     Heart:     Abdomen:   non-distended   Extremities: Moves all. Skin: No acute rash on exposed skin   Neurologic:      Labs:        No lab exists for component: ITNL   No results for input(s): CPK, CKMB, TROIQ in the last 72 hours.   Recent Labs     22  0400 22  0904 22  0015 22  1759     --  138 135*   K 3.2*  --  3.0* 3.4*     --  107 104   CO2   --  22 20*   BUN 23*  --  43* 44*   CREA 0.92  --  1.59* 1.82*   *  --  149* 187*   PHOS  --  2.2*  --   --    MG  --  2.1  --  1.7   ALB  --   --   --  3.7   WBC 9.7  --  16.2* 18.3*   HGB 12.4  --  12.4 13.9   HCT 35.6  --  35.2 39.2   PLT 64*  --  65* 82*     No results for input(s): INR, PTP, APTT, INREXT in the last 72 hours. Needs: urine analysis, urine sodium, protein and creatinine  Lab Results   Component Value Date/Time    Sodium,urine random 42 09/06/2022 10:00 AM    Creatinine, urine 88.00 09/06/2022 10:00 AM         Cultures:     No results found for: SDES  Lab Results   Component Value Date/Time    Culture result: NO GROWTH AFTER 2 HOURS 09/07/2022 04:00 AM    Culture result: (A) 09/05/2022 06:40 PM     PROBABLE ESCHERICHIA COLI GROWING IN BOTH BOTTLES DRAWN (SITE = L ARM) REFER TO [id number] FOR SENSITIVITIES    Culture result: (A) 09/05/2022 05:59 PM     PROBABLE ESCHERICHIA COLI GROWING IN BOTH BOTTLES DRAWN (SITE = RAC) SENSITIVITY TO FOLLOW    Culture result:  09/05/2022 05:59 PM     (NOTE) GNR CALLED TO JOSHUA ADLER, AT 1120 ON 9/6/22. RF       Radiology:     Medications       Current Facility-Administered Medications   Medication Dose Route Frequency Last Admin    potassium chloride SR (KLOR-CON 10) tablet 40 mEq  40 mEq Oral Q2H 40 mEq at 09/07/22 0856    lisinopriL (PRINIVIL, ZESTRIL) tablet 20 mg  20 mg Oral DAILY 20 mg at 09/07/22 0856    loperamide (IMODIUM) capsule 2 mg  2 mg Oral Q4H PRN 2 mg at 09/07/22 0923    heparin (porcine) injection 5,000 Units  5,000 Units SubCUTAneous Q8H 5,000 Units at 09/07/22 5572    sodium chloride (NS) flush 5-10 mL  5-10 mL IntraVENous PRN      piperacillin-tazobactam (ZOSYN) 3.375 g in 0.9% sodium chloride (MBP/ADV) 100 mL MBP  3.375 g IntraVENous Q8H 3.375 g at 09/07/22 0230    ALPRAZolam (XANAX) tablet 0.5 mg  0.5 mg Oral QHS PRN      amLODIPine (NORVASC) tablet 5 mg  5 mg Oral DAILY 5 mg at 09/07/22 0856    pantoprazole (PROTONIX) tablet 40 mg  40 mg Oral ACB 40 mg at 09/07/22 0633    sertraline (ZOLOFT) tablet 100 mg  100 mg Oral DAILY 100 mg at 09/07/22 0856    lactated Ringers infusion  125 mL/hr IntraVENous CONTINUOUS 125 mL/hr at 09/06/22 2122    sodium chloride (NS) flush 5-40 mL  5-40 mL IntraVENous Q8H 10 mL at 09/07/22 0634    sodium chloride (NS) flush 5-40 mL  5-40 mL IntraVENous PRN      acetaminophen (TYLENOL) tablet 650 mg  650 mg Oral Q6H PRN      Or    acetaminophen (TYLENOL) suppository 650 mg  650 mg Rectal Q6H PRN      polyethylene glycol (MIRALAX) packet 17 g  17 g Oral DAILY PRN      ondansetron (ZOFRAN ODT) tablet 4 mg  4 mg Oral Q8H PRN      Or    ondansetron (ZOFRAN) injection 4 mg  4 mg IntraVENous Q6H PRN             Case discussed with:      Charlotte Lynch, DO

## 2022-09-07 NOTE — PROGRESS NOTES
Bedside shift change report given to Rayna  (oncoming nurse) by [address] Sammy (offgoing nurse). Report included the following information SBAR, Intake/Output, MAR, and Recent Results.

## 2022-09-07 NOTE — PROGRESS NOTES
[address] 19  [phone]         Hospitalist Progress Note        NAME:  Micheal Delay   :  1945   MRN:  [id number]    Date/Time:  2022     Patient PCP:  Maurice Mosqueda MD    Emergency Contact:    Extended Emergency Contact Information  Primary Emergency Contact: Piotr Vinson  Address: [address]ali[address], [address] [place] [address] Phone: [phone]  Relation: Spouse  Secondary Emergency Contact: [address]  Mobile Phone: [phone]  Relation: Son  Preferred language: ENGLISH      Code: DNR     Isolation Precautions: There are currently no Active Isolations        Subjective:     REASON FOR VISIT:  Recheck UTI / Bacteremia     HPI & INTERVAL HISTORY:     Ms. Alyssa Dean is a 65 y.o. female with history that includes recurrent cystitits, anxiety/depression, and HTN presented with dysuria x 10 days, chills and N/V. Found to have UTI and met criteria for sepsis based on HR, Temp, WBC, and UTI. Started on ABXs. GNR growing in blood culture. : Patient reports feeling better today. Reports feeling less foggy headed and not as weak. Blood cultures appear to be preliminarily E. Coli and urine culture showing GNR.    : Patient was seen and examined. She reports that after starting antibiotic she is feeling better. Has not had any further episodes of fevers or chills. Has been able to keep down her food as she is no longer nauseated and no longer vomiting. Blood cultures growing gram-negative rods.       ALLERGIES  Allergies   Allergen Reactions    Flagyl [Metronidazole] Nausea Only    Macrobid [Nitrofurantoin Monohyd/M-Cryst] Nausea Only    Sulfa (Sulfonamide Antibiotics) Rash         ROS:  Gen:   Negative and chills but no fever  Resp:  negative  CVS:   negative  GI:       negative and nausea vomiting resolved  :     dysuria           Objective:      Visit Vitals  BP (!) 152/67 (BP 1 Location: Left arm, BP Patient Position: At rest)   Pulse 98   Temp 98.5 °F (36.9 °C)   Resp 16   Ht 5' 4\" (1.626 m)   Wt 59.7 kg (131 lb 9.6 oz)   SpO2 94%   BMI 22.59 kg/m²       Physical Exam:  General: alert, well appearing, and no distress  Head: Normocephalic, without obvious abnormality, atraumatic  Eyes: anicteric sclerae and conjuntiva clear  ENT: lips, mucosa, and tongue normal  Neck: normal, supple, and no tenderness  Lungs: CTA  Heart: S1, S2 normal, regular rate, and regular rhythm  Abd: not distended, soft, nontender, BS present and normactive  Ext: no cyanosis and no edema  Skin: normal skin color, no rashes, and texture normal  Neuro:  alert, oriented, no defects noted in general exam.  Psych: not anxious, cooperative, appropriate affect      Medications:  Current Facility-Administered Medications   Medication Dose Route Frequency    loperamide (IMODIUM) capsule 2 mg  2 mg Oral Q4H PRN    heparin (porcine) injection 5,000 Units  5,000 Units SubCUTAneous Q8H    sodium chloride (NS) flush 5-10 mL  5-10 mL IntraVENous PRN    piperacillin-tazobactam (ZOSYN) 3.375 g in 0.9% sodium chloride (MBP/ADV) 100 mL MBP  3.375 g IntraVENous Q8H    ALPRAZolam (XANAX) tablet 0.5 mg  0.5 mg Oral QHS PRN    amLODIPine (NORVASC) tablet 5 mg  5 mg Oral DAILY    pantoprazole (PROTONIX) tablet 40 mg  40 mg Oral ACB    sertraline (ZOLOFT) tablet 100 mg  100 mg Oral DAILY    lactated Ringers infusion  125 mL/hr IntraVENous CONTINUOUS    sodium chloride (NS) flush 5-40 mL  5-40 mL IntraVENous Q8H    sodium chloride (NS) flush 5-40 mL  5-40 mL IntraVENous PRN    acetaminophen (TYLENOL) tablet 650 mg  650 mg Oral Q6H PRN    Or    acetaminophen (TYLENOL) suppository 650 mg  650 mg Rectal Q6H PRN    polyethylene glycol (MIRALAX) packet 17 g  17 g Oral DAILY PRN    ondansetron (ZOFRAN ODT) tablet 4 mg  4 mg Oral Q8H PRN    Or    ondansetron (ZOFRAN) injection 4 mg  4 mg IntraVENous Q6H PRN        Labs:  Recent Labs 09/07/22  0400   WBC 9.7   HGB 12.4   HCT 35.6   PLT 64*       Recent Labs     09/07/22  0400 09/06/22  0904 09/06/22  0015 09/05/22  1759     --    < > 135*   K 3.2*  --    < > 3.4*     --    < > 104   CO2 22  --    < > 20*   *  --    < > 187*   BUN 23*  --    < > 44*   CREA 0.92  --    < > 1.82*   CA 8.5 8.4*   < > 8.7   MG  --  2.1  --  1.7   PHOS  --  2.2*  --   --    ALB  --   --   --  3.7   TBILI  --   --   --  1.5*   ALT  --   --   --  22    < > = values in this interval not displayed. Radiology:  US RETROPERITONEUM COMP    Result Date: 9/6/2022  1. Mild RIGHT hydronephrosis. 2.  Nonobstructing RIGHT lower pole renal calculus. 3.  LEFT lower pole cyst.   I personally reviewed and interpreted the imaging studies and agree with official reading    The labs, imaging studies, and medications was reviewed by me on: September 7, 2022         Assessment/Plan:      Acute GNR likely E. coli sepsis due to UTI with organ dysfunction (ALBINA) POA:  Sepsis has resolved  Continue with Zosyn day 3 awaiting finalization of cultures  BCX growing what appears to be E. coli with repeat cultures sent today  UCX with GNR fact E. coli  Echocardiogram for bacteremia  Monitor labs  Consulted ID    Elevated Troponin:  Unsure of cause possible UTI otherwise asymptomatic  Repeat troponins have been flat with no symptoms we will obtain echocardiogram and consider cardiology consult but given the findings do not think will be necessary at this time. ALBINA vs ALBINA on CKD vs CKD:  Baseline unknown but last labs in chart 2016 wnL  Creatinine now within normal limits more consistent with ALBINA from sepsis  Continue IV fluids and monitoring labs  Labs: Chemistry, MAG, PO4, A1C, UA, Intact PTH, Vit.  D 1.25, and Erythropoeitin    HTN:  Continue norvasc and restart lisinopril now that kidney function is back to normal    Anxiety/Depression:  Cont w/ Zoloft 100mg daily  Cont w/ Xanax 0.5mg qhs     GERD:  Continue PPI    Body mass index is 22.59 kg/m².:  18.5 - 24.9:  Normal weight      Discussed:  Pt's condition, Imaging findings, Lab findings, Assessment, and Care Plan discussed with: Patient, RN, Specialist, and Care Manager    Prophylaxis:  Hep SQ    Probable disposition:  Home with family      Total time: -28- minutes **I personally saw and examined the patient during this time period**      Date of service:    9/7/2022                ___________________________________________________    Admitting Physician: Yeyo Babb MD

## 2022-09-07 NOTE — PROGRESS NOTES
Follow up spiritual care and Advance Medical Directive (AMD) with patient, Mrs. Karen Garza on 4M post surg unit. Pt has a provider and her  in the room. She introduces her , and she is having difficulty getting sentences out and after all the treatment she has had today, she is drowsy. She expressed that they have 1 grown son, raised Baptism but have not practiced araceli in many years. She expressed that she does have a Medical Directive from her Roxana Rob but she desires to update it. She said that she is in no condition to do AMD now.  listened to both Mr and Mrs Surinder Pozo life stories and [facility] should she feel to do AMD. They expressed their gratitude.      LEONELA Richardson.Div.  [phone] ([phone])

## 2022-09-08 LAB
ANION GAP SERPL CALC-SCNC: 7 MMOL/L (ref 5–15)
BACTERIA SPEC CULT: ABNORMAL
BASOPHILS # BLD: 0 K/UL (ref 0–0.1)
BASOPHILS NFR BLD: 0 % (ref 0–1)
BUN SERPL-MCNC: 17 MG/DL (ref 6–20)
BUN/CREAT SERPL: 22 (ref 12–20)
CALCIUM SERPL-MCNC: 8.7 MG/DL (ref 8.5–10.1)
CHLORIDE SERPL-SCNC: 105 MMOL/L (ref 97–108)
CO2 SERPL-SCNC: 23 MMOL/L (ref 21–32)
CREAT SERPL-MCNC: 0.79 MG/DL (ref 0.55–1.02)
DIFFERENTIAL METHOD BLD: ABNORMAL
EOSINOPHIL # BLD: 0.1 K/UL (ref 0–0.4)
EOSINOPHIL NFR BLD: 1 % (ref 0–7)
ERYTHROCYTE [DISTWIDTH] IN BLOOD BY AUTOMATED COUNT: 13.2 % (ref 11.5–14.5)
GLUCOSE SERPL-MCNC: 103 MG/DL (ref 65–100)
HCT VFR BLD AUTO: 33.5 % (ref 35–47)
HGB BLD-MCNC: 11.9 G/DL (ref 11.5–16)
IMM GRANULOCYTES # BLD AUTO: 0.1 K/UL (ref 0–0.04)
IMM GRANULOCYTES NFR BLD AUTO: 1 % (ref 0–0.5)
LYMPHOCYTES # BLD: 0.6 K/UL (ref 0.8–3.5)
LYMPHOCYTES NFR BLD: 6 % (ref 12–49)
MCH RBC QN AUTO: 31.8 PG (ref 26–34)
MCHC RBC AUTO-ENTMCNC: 35.5 G/DL (ref 30–36.5)
MCV RBC AUTO: 89.6 FL (ref 80–99)
MONOCYTES # BLD: 0.8 K/UL (ref 0–1)
MONOCYTES NFR BLD: 8 % (ref 5–13)
NEUTS SEG # BLD: 7.9 K/UL (ref 1.8–8)
NEUTS SEG NFR BLD: 84 % (ref 32–75)
NRBC # BLD: 0 K/UL (ref 0–0.01)
NRBC BLD-RTO: 0 PER 100 WBC
PLATELET # BLD AUTO: 77 K/UL (ref 150–400)
PMV BLD AUTO: 10.9 FL (ref 8.9–12.9)
POTASSIUM SERPL-SCNC: 3.8 MMOL/L (ref 3.5–5.1)
RBC # BLD AUTO: 3.74 M/UL (ref 3.8–5.2)
SERVICE CMNT-IMP: ABNORMAL
SERVICE CMNT-IMP: ABNORMAL
SODIUM SERPL-SCNC: 135 MMOL/L (ref 136–145)
WBC # BLD AUTO: 9.5 K/UL (ref 3.6–11)

## 2022-09-08 PROCEDURE — 74011000250 HC RX REV CODE- 250: Performed by: INTERNAL MEDICINE

## 2022-09-08 PROCEDURE — 74011250636 HC RX REV CODE- 250/636: Performed by: INTERNAL MEDICINE

## 2022-09-08 PROCEDURE — 74011250637 HC RX REV CODE- 250/637: Performed by: INTERNAL MEDICINE

## 2022-09-08 PROCEDURE — 74011250636 HC RX REV CODE- 250/636: Performed by: HOSPITALIST

## 2022-09-08 PROCEDURE — 93005 ELECTROCARDIOGRAM TRACING: CPT

## 2022-09-08 PROCEDURE — 74011250636 HC RX REV CODE- 250/636: Performed by: EMERGENCY MEDICINE

## 2022-09-08 PROCEDURE — 99233 SBSQ HOSP IP/OBS HIGH 50: CPT | Performed by: INTERNAL MEDICINE

## 2022-09-08 PROCEDURE — 36415 COLL VENOUS BLD VENIPUNCTURE: CPT

## 2022-09-08 PROCEDURE — 80048 BASIC METABOLIC PNL TOTAL CA: CPT

## 2022-09-08 PROCEDURE — 74011250637 HC RX REV CODE- 250/637: Performed by: HOSPITALIST

## 2022-09-08 PROCEDURE — 65270000029 HC RM PRIVATE

## 2022-09-08 PROCEDURE — 85025 COMPLETE CBC W/AUTO DIFF WBC: CPT

## 2022-09-08 PROCEDURE — 74011000258 HC RX REV CODE- 258: Performed by: EMERGENCY MEDICINE

## 2022-09-08 RX ORDER — CIPROFLOXACIN 500 MG/1
500 TABLET ORAL EVERY 12 HOURS
Status: DISCONTINUED | OUTPATIENT
Start: 2022-09-08 | End: 2022-09-17

## 2022-09-08 RX ADMIN — PIPERACILLIN AND TAZOBACTAM 3.38 G: 3; .375 INJECTION, POWDER, FOR SOLUTION INTRAVENOUS at 09:44

## 2022-09-08 RX ADMIN — SODIUM CHLORIDE, POTASSIUM CHLORIDE, SODIUM LACTATE AND CALCIUM CHLORIDE 125 ML/HR: 600; 310; 30; 20 INJECTION, SOLUTION INTRAVENOUS at 01:59

## 2022-09-08 RX ADMIN — Medication 10 ML: at 06:44

## 2022-09-08 RX ADMIN — CIPROFLOXACIN 500 MG: 500 TABLET, FILM COATED ORAL at 22:41

## 2022-09-08 RX ADMIN — Medication 10 ML: at 22:42

## 2022-09-08 RX ADMIN — Medication 10 ML: at 14:32

## 2022-09-08 RX ADMIN — PIPERACILLIN AND TAZOBACTAM 3.38 G: 3; .375 INJECTION, POWDER, FOR SOLUTION INTRAVENOUS at 01:59

## 2022-09-08 RX ADMIN — HEPARIN SODIUM 5000 UNITS: 5000 INJECTION INTRAVENOUS; SUBCUTANEOUS at 06:44

## 2022-09-08 RX ADMIN — SERTRALINE HYDROCHLORIDE 100 MG: 50 TABLET ORAL at 08:44

## 2022-09-08 RX ADMIN — PANTOPRAZOLE SODIUM 40 MG: 40 TABLET, DELAYED RELEASE ORAL at 06:44

## 2022-09-08 RX ADMIN — LISINOPRIL 20 MG: 20 TABLET ORAL at 08:44

## 2022-09-08 RX ADMIN — HEPARIN SODIUM 5000 UNITS: 5000 INJECTION INTRAVENOUS; SUBCUTANEOUS at 14:32

## 2022-09-08 RX ADMIN — HEPARIN SODIUM 5000 UNITS: 5000 INJECTION INTRAVENOUS; SUBCUTANEOUS at 22:41

## 2022-09-08 RX ADMIN — ACETAMINOPHEN 650 MG: 325 TABLET ORAL at 20:47

## 2022-09-08 RX ADMIN — AMLODIPINE BESYLATE 5 MG: 5 TABLET ORAL at 08:44

## 2022-09-08 RX ADMIN — ACETAMINOPHEN 650 MG: 325 TABLET ORAL at 12:42

## 2022-09-08 NOTE — PROGRESS NOTES
[facility]  [address], [facility] [address]  [phone]         Hospitalist Progress Note        NAME:  Anat Rocha   :  1945   MRN:  [id number]    Date/Time:  2022     Patient PCP:  Dalia Garcia MD    Emergency Contact:    Extended Emergency Contact Information  Primary Emergency Contact: Piotr Vinson  Address: [address] [facility] [address]           [address] [facility] [address], [address] [place] [address] [address] [place] [address] Phone: [phone]  Relation: Spouse  Secondary Emergency Contact: [address]  Mobile Phone: [phone]  Relation: Son  Preferred language: ENGLISH      Code: DNR     Isolation Precautions: There are currently no Active Isolations        Subjective:     REASON FOR VISIT:  Recheck UTI / Bacteremia     HPI & INTERVAL HISTORY:     Ms. Howie Ramos is a 65 y.o. female with history that includes recurrent cystitits, anxiety/depression, and HTN presented with dysuria x 10 days, chills and N/V. Found to have UTI and met criteria for sepsis based on HR, Temp, WBC, and UTI. Started on ABXs. GNR growing in blood culture. : Patient seen and examined. She looks and feels better but still with some malaise and poor appetite. : Patient reports feeling better today. Reports feeling less foggy headed and not as weak. Blood cultures appear to be preliminarily E. Coli and urine culture showing GNR. Echo:    Left Ventricle: Normal left ventricular systolic function with a visually estimated EF of 55 - 60%. Left ventricle size is normal. Normal wall thickness. Normal wall motion. Normal diastolic function. Aortic Valve: Mild regurgitation. Mild stenosis of the aortic valve. AV mean gradient is 18 mmHg. Mitral Valve: Mildly thickened leaflet. Moderate annular calcification of the mitral valve. : Patient was seen and examined. She reports that after starting antibiotic she is feeling better. Has not had any further episodes of fevers or chills.   Has been able to keep down her food as she is no longer nauseated and no longer vomiting. Blood cultures growing gram-negative rods.       ALLERGIES  Allergies   Allergen Reactions    Flagyl [Metronidazole] Nausea Only    Macrobid [Nitrofurantoin Monohyd/M-Cryst] Nausea Only    Sulfa (Sulfonamide Antibiotics) Rash         ROS:  Gen:   Negative and chills but no fever  Resp:  negative  CVS:   negative  GI:       negative and nausea vomiting resolved  :     dysuria           Objective:      Visit Vitals  BP (!) 160/80 (BP 1 Location: Left leg, BP Patient Position: At rest)   Pulse 85   Temp 98.8 °F (37.1 °C)   Resp 16   Ht 5' 4\" (1.626 m)   Wt 59.4 kg (131 lb)   SpO2 92%   BMI 22.49 kg/m²       Physical Exam:  General: No acute distress  Head: Normocephalic, without obvious abnormality, atraumatic  Eyes: anicteric sclerae and conjuntiva clear  ENT: lips, mucosa, and tongue normal  Neck: normal, supple, and no tenderness  Lungs: clear to auscultation  Heart: S1, S2 normal, regular rate, and regular rhythm  Abd: not distended, soft, nontender, BS present and normactive  Ext: no cyanosis and no edema  Skin: normal skin color, no rashes, and texture normal  Neuro:  alert, oriented, no defects noted in general exam.  Psych: not anxious, cooperative, appropriate affect      Medications:  Current Facility-Administered Medications   Medication Dose Route Frequency    lisinopriL (PRINIVIL, ZESTRIL) tablet 20 mg  20 mg Oral DAILY    loperamide (IMODIUM) capsule 2 mg  2 mg Oral Q4H PRN    heparin (porcine) injection 5,000 Units  5,000 Units SubCUTAneous Q8H    sodium chloride (NS) flush 5-10 mL  5-10 mL IntraVENous PRN    piperacillin-tazobactam (ZOSYN) 3.375 g in 0.9% sodium chloride (MBP/ADV) 100 mL MBP  3.375 g IntraVENous Q8H    ALPRAZolam (XANAX) tablet 0.5 mg  0.5 mg Oral QHS PRN    amLODIPine (NORVASC) tablet 5 mg  5 mg Oral DAILY    pantoprazole (PROTONIX) tablet 40 mg  40 mg Oral ACB    sertraline (ZOLOFT) tablet 100 mg 100 mg Oral DAILY    lactated Ringers infusion  125 mL/hr IntraVENous CONTINUOUS    sodium chloride (NS) flush 5-40 mL  5-40 mL IntraVENous Q8H    sodium chloride (NS) flush 5-40 mL  5-40 mL IntraVENous PRN    acetaminophen (TYLENOL) tablet 650 mg  650 mg Oral Q6H PRN    Or    acetaminophen (TYLENOL) suppository 650 mg  650 mg Rectal Q6H PRN    polyethylene glycol (MIRALAX) packet 17 g  17 g Oral DAILY PRN    ondansetron (ZOFRAN ODT) tablet 4 mg  4 mg Oral Q8H PRN    Or    ondansetron (ZOFRAN) injection 4 mg  4 mg IntraVENous Q6H PRN        Labs:  Recent Labs     09/08/22  0400   WBC 9.5   HGB 11.9   HCT 33.5*   PLT 77*       Recent Labs     09/08/22  0400 09/07/22  0400 09/06/22  0904 09/06/22  0015 09/05/22  1759   *   < >  --    < > 135*   K 3.8   < >  --    < > 3.4*      < >  --    < > 104   CO2 23   < >  --    < > 20*   *   < >  --    < > 187*   BUN 17   < >  --    < > 44*   CREA 0.79   < >  --    < > 1.82*   CA 8.7   < > 8.4*   < > 8.7   MG  --   --  2.1  --  1.7   PHOS  --   --  2.2*  --   --    ALB  --   --   --   --  3.7   TBILI  --   --   --   --  1.5*   ALT  --   --   --   --  22    < > = values in this interval not displayed. Radiology:  No results found.   I personally reviewed and interpreted the imaging studies and agree with official reading    The labs, imaging studies, and medications was reviewed by me on: September 8, 2022         Assessment/Plan:      Acute GNR likely E. coli sepsis due to UTI with organ dysfunction (ALBINA) POA:  Sepsis has resolved  Continue with Zosyn day 3 awaiting finalization of cultures  BCX growing what appears to be E. coli with repeat cultures sent today  UCX with GNR fact E. coli  Echocardiogram for bacteremia  Monitor labs  Consulted ID  Urology consult pending    Elevated Troponin:  Unsure of cause possible UTI otherwise asymptomatic  Repeat troponins have been flat with no symptoms Echo unremarkable consider cardiology consult but given the findings do not think will be necessary at this time.      ALBINA:  Baseline unknown but last labs in chart 2016 wnL  Creatinine now within normal limits more consistent with ALBINA from sepsis CKD labs unremarkable  Continue IV fluids and monitoring labs    HTN:  Continue norvasc and restart lisinopril now that kidney function is back to normal    Anxiety/Depression:  Cont w/ Zoloft 100mg daily  Cont w/ Xanax 0.5mg qhs     GERD:  Continue PPI    Body mass index is 22.49 kg/m².:  18.5 - 24.9:  Normal weight      Discussed:  Pt's condition, Imaging findings, Lab findings, Assessment, and Care Plan discussed with: Patient, RN, Specialist, and Care Manager    Prophylaxis:  Hep SQ    Probable disposition:  Home with family      Total time: -28- minutes **I personally saw and examined the patient during this time period**      Date of service:    9/8/2022                ___________________________________________________    Admitting Physician: Adenike Faye MD

## 2022-09-08 NOTE — PROGRESS NOTES
Problem: Pressure Injury - Risk of  Goal: *Prevention of pressure injury  Description: Document Asad Scale and appropriate interventions in the flowsheet.   Outcome: Progressing Towards Goal  Note: Pressure Injury Interventions:       Moisture Interventions: Absorbent underpads, Minimize layers    Activity Interventions: Increase time out of bed, PT/OT evaluation    Mobility Interventions: Float heels, HOB 30 degrees or less, PT/OT evaluation    Nutrition Interventions: Document food/fluid/supplement intake                     Problem: Patient Education: Go to Patient Education Activity  Goal: Patient/Family Education  Outcome: Progressing Towards Goal

## 2022-09-08 NOTE — PROGRESS NOTES
Problem: Pressure Injury - Risk of  Goal: *Prevention of pressure injury  Description: Document Asad Scale and appropriate interventions in the flowsheet. Outcome: Progressing Towards Goal  Note: Pressure Injury Interventions:  Sensory Interventions: Assess changes in LOC    Moisture Interventions: Absorbent underpads    Activity Interventions: PT/OT evaluation, Increase time out of bed    Mobility Interventions: HOB 30 degrees or less, Float heels    Nutrition Interventions: Document food/fluid/supplement intake, Offer support with meals,snacks and hydration                     Problem: Patient Education: Go to Patient Education Activity  Goal: Patient/Family Education  Outcome: Progressing Towards Goal     Problem: Falls - Risk of  Goal: *Absence of Falls  Description: Document Michael Fall Risk and appropriate interventions in the flowsheet.   Outcome: Progressing Towards Goal  Note: Fall Risk Interventions:  Mobility Interventions: Bed/chair exit alarm    Mentation Interventions: Bed/chair exit alarm    Medication Interventions: Bed/chair exit alarm    Elimination Interventions: Bed/chair exit alarm, Call light in reach    History of Falls Interventions: Bed/chair exit alarm         Problem: Patient Education: Go to Patient Education Activity  Goal: Patient/Family Education  Outcome: Progressing Towards Goal     Problem: Pain  Goal: *Control of Pain  Outcome: Progressing Towards Goal

## 2022-09-08 NOTE — PROGRESS NOTES
Comprehensive Nutrition Assessment    Type and Reason for Visit: Positive nutrition screen    Nutrition Recommendations/Plan:   Consider OT/PT evaluation - self-feeding difficulties  Add Ensure High Protein BID to promote intake of protein & calories     Malnutrition Assessment:  Malnutrition Status:  Mild malnutrition (09/08/22 1413)    Context:  Acute illness     Findings of the 6 clinical characteristics of malnutrition:   Nutrition Focused Physical Exam completed & wt loss noted from remote wt record (unsure timeline)  Energy Intake:  Mild decrease in energy intake (specify)  Weight Loss:  No significant weight loss     Body Fat Loss:  Mild body fat loss, Triceps   Muscle Mass Loss:  Mild muscle mass loss, Scapula (trapezius), Calf  Fluid Accumulation:  No significant fluid accumulation,     Strength:  Not performed     Nutrition Assessment:      58: 65year old female admitted with UTI (urinary tract infection) [N39.0] who  has no past medical history on file. Pt is not vomiting & less nausea today but patient is hesitant to challenge her tolerance of foods today. RD visited room & patient voiced she wasn't able to get to her entree for lunch & became frustrated. She said, \"they have me tied up & can't use this arm\". Pt was found to be disheveled in her gown. RD assisted but pt still didn't want to try to eat any more when RD offered to feed her. When asked, pt said she wouldn't want to be fed her dinner. She may benefit from evaluation from OT to be sure she is able to self-feed. Nutrition Related Findings:      Wound Type: None  Last Bowel Movement Date: 09/07/22  Stool Appearance: Watery  Abdominal Assessment: Intact  Appetite: Poor  Bowel Sounds: Active   Edema:No data recorded    Nutr.  Labs:  Lab Results   Component Value Date/Time    GFR est AA >60 09/08/2022 04:00 AM    GFR est non-AA >60 09/08/2022 04:00 AM    Creatinine 0.79 09/08/2022 04:00 AM    BUN 17 09/08/2022 04:00 AM    Sodium 135 (L) 09/08/2022 04:00 AM    Potassium 3.8 09/08/2022 04:00 AM    Chloride 105 09/08/2022 04:00 AM    CO2 23 09/08/2022 04:00 AM     Lab Results   Component Value Date/Time    Glucose 103 (H) 09/08/2022 04:00 AM     Lab Results   Component Value Date/Time    Hemoglobin A1c 5.2 09/06/2022 09:04 AM       Nutr. Meds:  Norvasc, LR @ 125 mL/hr, Protonix, Zoloft, zosyn  PRN: Miralax, Imodium, xanax    Current Nutrition Intake & Therapies:  Average Meal Intake: 1-25%  Average Supplement Intake: None ordered  ADULT DIET Regular    Documented Meal intake:  Patient Vitals for the past 168 hrs:   % Diet Eaten   09/08/22 1242 1 - 25%     Documentation of supplement intake:  No data found. Anthropometric Measures:  Height: 5' 4\" (162.6 cm)  Ideal Body Weight (IBW): 120 lbs (55 kg)  Admission Body Weight: 142 lb  Current Body Wt:  59.4 kg (130 lb 15.3 oz), 109.1 % IBW. Bed scale  Current BMI (kg/m2): 22.5        Weight Adjustment: No adjustment                   BMI Category: Normal weight (BMI 18.5-24. 9)  Last 3 Recorded Weights in this Encounter    09/05/22 1750 09/07/22 0405 09/07/22 1007   Weight: 64.4 kg (142 lb) 59.7 kg (131 lb 9.6 oz) 59.4 kg (131 lb)     Wt Readings from Last 10 Encounters:   09/07/22 59.4 kg (131 lb)   02/08/17 64.4 kg (142 lb)   07/06/15 65.3 kg (144 lb)     Estimated Daily Nutrient Needs:  Energy Requirements Based On: Kcal/kg  Weight Used for Energy Requirements: Current  Energy (kcal/day): 1485 - 1665 kcal/d (25-28 kcal/kg)  Weight Used for Protein Requirements: Current  Protein (g/day): 48 - 65 g (0.8-1.1 g/kg)  Method Used for Fluid Requirements: 1 ml/kcal  Fluid (ml/day): 1485 - 09045 mL    Nutrition Diagnosis:   Inadequate protein-energy intake related to early satiety (difficulty with self-feeding) as evidenced by intake 0-25%, weight loss (declines assistance from RD with eating lunch meal today)    Nutrition Interventions:   Food and/or Nutrient Delivery: Modify current diet, Start oral nutrition supplement  Nutrition Education/Counseling: Counseling initiated  Coordination of Nutrition Care: Continue to monitor while inpatient, Feeding assistance/environmental change  Plan of Care discussed with: patient    Goals:     Goals: PO intake 50% or greater, by next RD assessment       Nutrition Monitoring and Evaluation:   Behavioral-Environmental Outcomes: Beliefs and attitudes  Food/Nutrient Intake Outcomes: Supplement intake, Food and nutrient intake  Physical Signs/Symptoms Outcomes: Biochemical data, Meal time behavior, Weight    Discharge Planning:    Continue oral nutrition supplement    Doug Zapata RD, MS  Contact via [employer] or office [phone]

## 2022-09-08 NOTE — CONSULTS
New Urology Consult Note    Patient: Mary Anne Jose MRN: [id number]  SSN: [id number]    YOB: 1945  Age: 65 y.o. Sex: female            Assessment:Plan:     AF, No leukocytosis  Cr 0.79  Resting in bed, NAD with family at bedside     CT abd/pelv 9/5:  IMPRESSION  1. Multifocal areas of pulmonary groundglass opacification and patchy  consolidation in medial segment of right middle lobe for which clinical  correlation is recommended as above. 2. Hepatosplenomegaly. 3. Several tiny dependent gallstones. Mild gallbladder distention. 3. 2 nonobstructing calculi in the inferior pole the right kidney measuring 14 mm and 9 mm. 4. Right perinephric fat stranding and mild right renal pelvocaliectasis with  abrupt transition at UPJ. The ureters distal to this point is of normal caliber. There is a 4 mm calcification in the pelvis approximately 2 cm proximal to the UVJ which might represent a retained renal calculus. 5. Diffuse colonic diverticulosis. No diverticulitis demonstrated. Rectosigmoid  anastomosis is noted. 6. Chronic appearing moderate to severe T12 vertebral compression fracture. Renal US 9/6:  IMPRESSION  1. Mild RIGHT hydronephrosis. 2.  Nonobstructing RIGHT lower pole renal calculus. 3.  LEFT lower pole cyst.    Plan:   Bilateral extrarenal pelvis, sepsis 2/2 UTI, ALBINA   -Known hx of bilateral extrarenal pelvis noted on previous CTIVP in office. Images personally reviewed and discussed with Dr. Marissa Rhodes. Also reviewed office CTIVP from earlier this year and compared to current imaging- no significant changes. -ID following, UCX prelim [id number] colonies GNR, BCX + Ecoli, Abx per ID. Receiving Cipro  -Cr has normalized, Cr 0.79    No acute interventions planned. Continue with abx per ID. Pt to follow up as scheduled in office with Dr. Neeta Burnette next month.  Offered moving appt to earlier date, however, pt wishes to wait and adjust appt based on her schedule once home.    Plan discussed with Dr. Marissa Rhodes   Thank you for this consult. Please contact [facility] with any further questions/concerns. History of Present Illness:     Chief Complaint:  dysuria     Mary Anne Jose is seen in consultation for reasons noted above at the request of Ashley Kay MD.    This is a 65 y.o. female with a history of HTN, anxiety, depression, interstitial cystitis, recurrent bladder pain, bilateral extrarenal pelvis presented to the ER with complaints of vomiting and diarrhea x1 day in addition to urinary urgency, frequency and dysuria x10 days. Denies hematuria, flank, or lower abdominal pain. She was ultimately admitted for sepsis 2/2 UTI. On 9/5/22, she had a CT abd/pelv performed, images personally reviewed, revealing bilateral extrarenal pelvis, two non-obstructing calculi in the inferior pole of the right kidney, and pelvic calcification. Renal US performed 9/6 noting mild right hydronephrosis. UCX prelim with [id number] colonies GNR. [address] with E.Coli in both bottles. ID following. Urology consulted today for abnormal findings on imaging, UTI, bacteremia. Known pt to [facility], last seen in office by Dr. Neeta Burnette 7/22/22. Office note from visit as below: \"She returns for follow-up. The patient is a 53-year-old lady with a chief complaint of interstitial cystitis and a history of recurrent bladder pain. She had been seen most recently on 05/02/2022. She had had a history of cystoscopy, retrograde pyelograms, and bladder biopsies for some inflammation and microscopic hematuria. This had been done on 03/22/2022 and her histology was benign. She had responded with her treatment management and has had less symptoms when I had last seen her in May. Her urinalysis today is clear. She is on Pyridium. Her labs were reviewed. She did have a urine culture on 05/23/2022 that was positive for E coli.   I did review her CT IVP from 01/06/2022 that had shown a history of a partial left colectomy with anastomosis. She had 2.4 cm static one right lower pole calculus and bilateral extrarenal pelvis with no hydronephrosis or hydroureter. No other acute findings were noted. I did review her films and she does have this large right lower pole calculus nonobstructing. She does report that she has had some suprapubic discomfort generally with standing. When she is sitting, she is completely asymptomatic and I have reviewed this with her today. She is not having any dysuria and has been very selective in her diet in an effort to reduce her exacerbation of her interstitial cystitis. She is not having any flank pain or other symptoms. Her urinalysis today is clear. PAST MEDICAL HISTORY:  Allergies: SULFA (Severe)  AMOXICILLIN (AMOXICILLIN CAPS) (Severe)  FLAGYL (Severe)  * SKELAXIN (Severe)  * SOLANPAS (Severe)  KEFLEX (Moderate)  MACROBID (Moderate)  DENIES: Shellfish, X-Ray Dye, Iodine.    Medications: Estrace 0.01% (0.1 mg/gram) cream (estradiol) M & W & F as directed  ondansetron HCl 4 mg tablet (ondansetron hcl) as needed   lisinopril 20 mg tablet (lisinopril) TAKE ONE TABLET BY MOUTH ONE TIME DAILY  alprazolam 0.5 mg tablet (alprazolam) TAKE ONE TABLET BY MOUTH TWICE A DAY AS NEEDED  amlodipine 5 mg tablet (amlodipine) TAKE ONE TABLET BY MOUTH ONE TIME DAILY FOR 90 DAYS  omeprazole 20 mg capsule,delayed release(DR/EC) (omeprazole) TAKE ONE CAPSULE BY MOUTH ONE TIME DAILY  metoclopramide HCl 5 mg tablet (metoclopramide hcl) TAKE ONE TABLET BY MOUTH ONE TIME DAILY AT BEDTIME FOR 90 DAYS  sertraline 100 mg tablet (sertraline) TAKE ONE TABLET BY MOUTH ONE TIME DAILY FOR 90 DAYS  Problems: Lesion, bladder (ICD-596.9) (IRY96-V50.9)  Other microscopic hematuria (LCV68-L18.29)  Recurrent UTI (ICD-599.0) (KWJ08-Y00.0)  Flank pain (ICD-789.09) (SAC47-B09.9)  Gallstones (ICD-574.20) (EZD65-M07.05)  History of nephrolithiasis (ICD-V13.01) (RPR63-O84.442)  Pyuria (ICD-791.9) (RLU38-H18.0)  Asymptomatic bacteriuria (ICD-791.9) (FLE39-I84.0)  Asymptomatic microscopic hematuria (ISM81-D85.21)  Gross hematuria (ICD-599.71) (FVA93-R11.0)  Vaginitis, atrophic (ICD-627.3) (VTW87-D21.2)  Nocturia (ICD-788.43) (VLZ65-Z39.1)  Latex allergy (ICD-V15.07) (SNQ60-A05.040)  Fatty liver (ICD-571.8) (KJM57-D10.0)  Pneumatouria (ICD-599.84) (TBC67-O15.89)  599.71 GROSS HEMATURIA (ICD-599.71) (DVD55-G06.0)  789.01 RUQ PAIN (ICD-789.01) (DOO56-X19.11)  592.1 CALCULUS, URETER (ICD-592.1) (ETI09-J89.1)  788.21 SYMPTOM, INCOMPLETE BLADDER EMPTYING (ICD-788.21) (TEK28-O81.14)  DYSURIA (ICD-788.1) (LDL74-D27.0)  UTI (ICD-599.0) (BVD01-C42.0)  RENAL CALCULUS (ICD-592.0) (HWN47-Y62.0)  CYSTITIS, ACUTE (ICD-595.0) (CWT23-H22.00)  CYSTITIS, CHRONIC (ICD-595.2) (RAJ82-U63.20)  788.41 FREQUENCY, URINARY (ICD-788.41) (UCJ48-G64.0)  595.1 CYSTITIS, CHRONIC INTERSTITIAL (ICD-595.1) (NFK14-Y37.10)  788.31 INCONTINENCE, URGE (ICD-788.31) (YON72-J34.41)  599.7 HEMATURIA (ICD-599.70) (QRW56-Y06.9)  599.0 URINARY TRACT INFECTION (ICD-599.0) (SZR32-U11.0)  Illnesses: High Blood Pressure, Bowel Problems, and Kidney Problems. DENIES: Heart Disease, Pacemaker/Defibrillator, Lung Disease, Diabetes, Stroke/Seizure, Bleeding Problems, HIV, Hepatitis, or Cancer. Surgeries: Cystoscopy with bladder biopsy andbilateral retrograde pyelograms 4/2019, Lithotripsy, and, Colon Surgery. Family History: DENIES: Kidney cancer, Kidney disease, Kidney stones, Breast cancer, Uterine cancer, Cervical cancer, Ovarian cancer. Social History: Retired. . Smoking status: former smoker. Drinks alcohol monthly or less. System Review: Admits to: Difficulty Walking, Dry Mouth, Dry Skin, and Easy Bleeding. DENIES: Unexplained Weight Loss, Dry Eyes, Leg Swelling, Shortness of Breath, Constipation, Involuntary Urine Loss, Lower Extremity Weakness, Psychiatric Problems, Impaired Sex Drive, Rash.    URINALYSIS from Voided specimen  Urine Dip: pH: 5.0, Bld: Neg, LE: Neg, Nit: Neg, Prot: Neg, Ket: Neg, Gluc: Neg  Urine Micro: WBC: 0, RBC: 1-2, Bacteria: 0  Comment: Urine dipstick is non-diagnostic due to staining with pyridium like agent. IMPRESSION:  1. History of interstitial cystitis. This has been controlled primarily with diet at this time and I have discussed options with her. She is comfortable currently and does not feel like she needs any cystoscopy or instillation. I reviewed this with her and our plan is observation currently and continuing her current dietary restrictions. 2. Renal calculus. She has been asymptomatic. This has been longstanding and not contributing to her current situation. 3. Recurrent urinary tract infections. She is asymptomatic at this time and has no evidence of pyuria or bacteriuria. I have recommended observation and continuing her current management. 4. History of bladder lesions. These were inflammatory on biopsies. We reviewed all this in detail. I am going to see her back in 3 months for a KUB and a followup urinalysis. She will contact us if she has any problems in the interim. Transcribed by MOP/carly  [facility]  E&M Service  Upcoming Orders  Return Office Visit - with Vikas Stern MD, FACS in 3 months  [facility] KUB, UA]  Electronically signed by Ramy Barraza on 07/25/2022 at 10:17 AM  Electronically signed by Vikas Stern MD, FACS on 07/26/2022 at 9:20 AM  ________________________________________________________________________\"      Subjective     Past Medical History  No past medical history on file. Past Surgical History:   No past surgical history on file.     Medication:  Current Facility-Administered Medications   Medication Dose Route Frequency Provider Last Rate Last Admin    lisinopriL (PRINIVIL, ZESTRIL) tablet 20 mg  20 mg Oral DAILY Rena Toussaint MD   20 mg at 09/08/22 0844    loperamide (IMODIUM) capsule 2 mg  2 mg Oral Q4H PRN Mariah STODDARD MD   2 mg at 09/07/22 0923    heparin (porcine) injection 5,000 Units  5,000 Units SubCUTAneous Q8H Rich Brooks MD   5,000 Units at 09/08/22 1432    sodium chloride (NS) flush 5-10 mL  5-10 mL IntraVENous PRN Mitchell Zimmer DO        piperacillin-tazobactam (ZOSYN) 3.375 g in 0.9% sodium chloride (MBP/ADV) 100 mL MBP  3.375 g IntraVENous Q8H Mitchell Zimmer DO 25 mL/hr at 09/08/22 0944 3.375 g at 09/08/22 0944    ALPRAZolam (XANAX) tablet 0.5 mg  0.5 mg Oral QHS PRN Naima Mejia MD        amLODIPine (NORVASC) tablet 5 mg  5 mg Oral DAILY Naima Mejia MD   5 mg at 09/08/22 0844    pantoprazole (PROTONIX) tablet 40 mg  40 mg Oral ACB Naima Mejia MD   40 mg at 09/08/22 0644    sertraline (ZOLOFT) tablet 100 mg  100 mg Oral DAILY Naima Mejia MD   100 mg at 09/08/22 0844    lactated Ringers infusion  125 mL/hr IntraVENous CONTINUOUS Naima Mejia  mL/hr at 09/08/22 0159 125 mL/hr at 09/08/22 0159    sodium chloride (NS) flush 5-40 mL  5-40 mL IntraVENous Q8H Naima Mejia MD   10 mL at 09/08/22 1432    sodium chloride (NS) flush 5-40 mL  5-40 mL IntraVENous PRN Naima Mejia MD        acetaminophen (TYLENOL) tablet 650 mg  650 mg Oral Q6H PRN Naima Mejia MD   650 mg at 09/08/22 1242    Or    acetaminophen (TYLENOL) suppository 650 mg  650 mg Rectal Q6H PRN Naima Mejia MD        polyethylene glycol (MIRALAX) packet 17 g  17 g Oral DAILY PRN Naima Mejia MD        ondansetron (ZOFRAN ODT) tablet 4 mg  4 mg Oral Q8H PRN Naima Mejia MD        Or    ondansetron [facility]) injection 4 mg  4 mg IntraVENous Q6H PRN Naima Mejia MD           Allergies: Allergies   Allergen Reactions    Flagyl [Metronidazole] Nausea Only    Macrobid [Nitrofurantoin Monohyd/M-Cryst] Nausea Only    Sulfa (Sulfonamide Antibiotics) Rash       Social History:  Social History     Tobacco Use    Smoking status: Never    Smokeless tobacco: Never   Substance Use Topics    Alcohol use: No       Family History  No family history on file.     Review of Systems  Unchanged from admitting provider note from 09/05/22 other than HPI.     Objective:     Vital signs in last 24 hours:  Visit Vitals  BP (!) 163/74   Pulse 85   Temp 98.8 °F (37.1 °C)   Resp 16   Ht 5' 4\" (1.626 m)   Wt 59.4 kg (131 lb)   SpO2 92%   BMI 22.49 kg/m²       Intake/Output last 3 shifts:  Date 09/07/22 0700 - 09/08/22 0659 09/08/22 0700 - 09/09/22 0659   Shift 9326-1554 3498-6719 24 Hour Total 7838-8211 0622-7974 24 Hour Total   INTAKE   Shift Total(mL/kg)         OUTPUT   Urine(mL/kg/hr)  350(0.5) 350(0.2)        Urine Voided  350 350        Urine Occurrence(s)  1 x 1 x      Stool           Stool Occurrence(s)  1 x 1 x      Shift Total(mL/kg)  350(5.9) 350(5.9)      NET  -350 -350      Weight (kg) 59.4 59.4 59.4 59.4 59.4 59.4       Physical Exam  General Appearance: NAD, awake  HENT: atraumatic, normal ears  Cardiovascular: not tachycardic, no distress  Respiratory: no distress, room air  Abdomen: soft, no suprapubic fullness or tenderness  : no CVA tenderness, voiding independently with cadence urine   Extremities: moves upper extremities during conversation   Musculoskeletal: normal alignment of neck and head  Neuro: Appropriate, no focal neurological deficits  Mood/Affect: appropriate, A&O x 3    Lab/Imaging Review:       Most Recent Labs:  Lab Results   Component Value Date/Time    WBC 9.5 09/08/2022 04:00 AM    HGB 11.9 09/08/2022 04:00 AM    HCT 33.5 (L) 09/08/2022 04:00 AM    PLATELET 77 (L) 90/92/9596 04:00 AM    MCV 89.6 09/08/2022 04:00 AM        Lab Results   Component Value Date/Time    Sodium 135 (L) 09/08/2022 04:00 AM    Potassium 3.8 09/08/2022 04:00 AM    Chloride 105 09/08/2022 04:00 AM    CO2 23 09/08/2022 04:00 AM    Anion gap 7 09/08/2022 04:00 AM    Glucose 103 (H) 09/08/2022 04:00 AM    BUN 17 09/08/2022 04:00 AM    Creatinine 0.79 09/08/2022 04:00 AM    BUN/Creatinine ratio 22 (H) 09/08/2022 04:00 AM    GFR est AA >60 09/08/2022 04:00 AM    GFR est non-AA >60 09/08/2022 04:00 AM    Calcium 8.7 09/08/2022 04:00 AM    Bilirubin, total 1.5 (H) 09/05/2022 05:59 PM    Alk. phosphatase 79 09/05/2022 05:59 PM    Protein, total 8.3 (H) 09/05/2022 05:59 PM    Albumin 3.7 09/05/2022 05:59 PM    Globulin 4.6 (H) 09/05/2022 05:59 PM    A-G Ratio 0.8 (L) 09/05/2022 05:59 PM    ALT (SGPT) 22 09/05/2022 05:59 PM        No results found for: PSA, PSA2, PSAR1, Nathanael David, PSAR3, RNT550596, MMQ995893, 09642, PSAEXT     COAGS:  No results found for: APTT, PTP, INR, INREXT    Lab Results   Component Value Date/Time    Hemoglobin A1c 5.2 09/06/2022 09:04 AM        No results found for: CPK, RCK1, RCK2, RCK3, RCK4, CKMB, CKNDX, CKND1, TROPT, TROIQ, BNPP, BNP       Urine/Blood Cultures:  Results       Procedure Component Value Units Date/Time    CULTURE, BLOOD, PAIRED [[id number]] Collected: 09/07/22 0400    Order Status: Completed Specimen: Blood Updated: 09/08/22 0552     Special Requests: NO SPECIAL REQUESTS        Culture result: NO GROWTH 1 DAY       URINE CULTURE HOLD SAMPLE [[id number]] Collected: 09/05/22 2221    Order Status: Completed Specimen: Urine from Serum Updated: 09/05/22 2230     Urine culture hold       Urine on hold in Microbiology dept for 2 days. If unpreserved urine is submitted, it cannot be used for addtional testing after 24 hours, recollection will be required.           CULTURE, URINE [[id number]]  (Abnormal) Collected: 09/05/22 2221    Order Status: Completed Specimen: Urine from Clean catch Updated: 09/08/22 0936     Special Requests: NO SPECIAL REQUESTS        [place] Count --        96644  COLONIES/mL       Culture result:       GRAM NEGATIVE RODS IDENTIFICATION AND SUSCEPTIBILITY TO FOLLOW          CULTURE, BLOOD [[id number]]  (Abnormal) Collected: 09/05/22 1840    Order Status: Completed Specimen: Blood Updated: 09/08/22 0940     Special Requests: NO SPECIAL REQUESTS        Culture result:       ESCHERICHIA COLI GROWING IN BOTH BOTTLES DRAWN (SITE = L ARM) REFER TO [id number] FOR SENSITIVITIES MICRO TRACKING [[id number]] Collected: 09/05/22 1840    Order Status: No result Updated: 09/06/22 Mark Givens [address] [[id number]] Collected: 09/05/22 1840    Order Status: No result Updated: 09/06/22 0850    COVID-19 RAPID TEST [[id number]] Collected: 09/05/22 1836    Order Status: Completed Specimen: Nasopharyngeal Updated: 09/05/22 1921     Specimen source Nasopharyngeal        COVID-19 rapid test Not detected        Comment: Rapid Abbott ID Now       Rapid NAAT:  The specimen is NEGATIVE for SARS-CoV-2, the novel coronavirus associated with COVID-19. Negative results should be treated as presumptive and, if inconsistent with clinical signs and symptoms or necessary for patient management, should be tested with an alternative molecular assay. Negative results do not preclude SARS-CoV-2 infection and should not be used as the sole basis for patient management decisions. This test has been authorized by the FDA under an Emergency Use Authorization (EUA) for use by authorized laboratories. Fact sheet for Healthcare Providers: ConventionDiomicsdate.co.nz  Fact sheet for Patients: Bionizdate.co.nz       Methodology: Isothermal Nucleic Acid Amplification         COVID-19 WITH INFLUENZA A/B [[id number]]     Order Status: Canceled Specimen: Nasopharyngeal     CULTURE, BLOOD [[id number]]  (Abnormal)  (Susceptibility) Collected: 09/05/22 1759    Order Status: Completed Specimen: Blood Updated: 09/08/22 0940     Special Requests: NO SPECIAL REQUESTS        Culture result:       ESCHERICHIA COLI GROWING IN BOTH BOTTLES DRAWN (SITE = RAC)            (NOTE) GNR CALLED TO JOSHUA ADLER, AT 1120 ON 9/6/22. RF    Susceptibility        Escherichia coli      ALISA      Amikacin ($) Susceptible      Ampicillin ($) Resistant      Ampicillin/sulbactam ($) Resistant      Cefazolin ($) Susceptible      Cefepime ($$) Susceptible      Cefoxitin Susceptible      Ceftazidime ($) Susceptible Ceftriaxone ($) Susceptible      Ciprofloxacin ($) Susceptible      Gentamicin ($) Susceptible      Levofloxacin ($) Susceptible      Meropenem ($$) Susceptible      Piperacillin/Tazobac ($) Susceptible      Tobramycin ($) Susceptible      Trimeth/Sulfa Susceptible                           MICRO TRACKING [[id number]] Collected: 09/05/22 1759    Order Status: No result Updated: 09/06/22 0615    MICRO TRACKING [[id number]] Collected: 09/05/22 1759    Order Status: No result Updated: 09/06/22 0723    BLOOD CULTURE ID PANEL [[id number]]  (Abnormal) Collected: 09/05/22 1759    Order Status: Completed Specimen: Blood Updated: 09/06/22 1358     Acc. no. from Micro Order [id number]     Enterococcus faecalis Not detected        Enterococcus faecium Not detected        Listeria monocytogenes Not detected        Staphylococcus Not detected        Staphylococcus aureus Not detected        Staph epidermidis Not detected        Staph lugdunensis Not detected        Streptococcus Not detected        Streptococcus agalactiae (Group B) Not detected        Streptococcus pneumoniae Not detected        Streptococcus pyogenes (Group A) Not detected        Acinetobacter calcoaceticus-baumanii complex Not detected        Bacteroides fragilis Not detected        Enterobacterales species Detected        Enterobacter cloacae complex Not detected        Escherichia coli Detected        Klebsiella aerogenes Not detected        Klebsiella oxytoca Not detected        Klebsiella pneumoniae group Not detected        Proteus Not detected        Salmonella Not detected        Serratia marcescens Not detected        Haemophilus influenzae Not detected        Neisseria meningitidis Not detected        Pseudomonas aeruginosa Not detected        Steno maltophilia Not detected        Candida albicans Not detected        Candida auris Not detected        Candida glabrata Not detected        Candida krusei Not detected        Candida parapsilosis Not detected Candida tropicalis Not detected        Crypto neoformans/gattii Not detected        RESISTANT GENES:            CTX-M (ESBL resistant gene) Not detected        IMP (Carbapenemase resistant gene) Not detected        KPC (Carbapenem Resistance Gene) Not detected        mcr-1 (Colistin resistant gene) Not detected        NDM (Carbapenemase resistant gene) Not detected        OXA-48-like (Carbapenemase resistant gene) Not detected        VIM (Carbapenemase resistant gene) Not detected        Comment       False positive results may rarely occur. Correlate with clinical,epidemiologic, and other laboratory findings           Comment: Please see BCID Interpretation Guide in EPIC Links                  IMAGING:  No results found.         Signed By: Kirsten Haney NP  - September 8, 2022

## 2022-09-08 NOTE — PROGRESS NOTES
9/8/2022  12:24 PM    Care Management Progress Note      ICD-10-CM ICD-9-CM    1. Sepsis with acute renal failure without septic shock, due to unspecified organism, unspecified acute renal failure type ([facility]  A41.9 038.9     R65.20 995.92     N17.9 584.9       2. ALBINA (acute kidney injury) ([facility]  N17.9 584.9       3. Elevated troponin  R77.8 790.6       4. Leukocytosis, unspecified type  D72.829 288.60       5. Nausea and vomiting in adult  R11.2 787.01       6. Suprapubic abdominal pain  R10.2 789.09       7. Pyelonephritis  N12 590.80       8. Bacteremia due to Escherichia coli  R78.81 790.7     B96.20 041.49           RUR:  9%  Risk Level: [x]Low []Moderate []High  Value-based purchasing: [] Yes [x] No  Bundle patient: [] Yes [x] No   Specify:     Transition of care plan:  ID and urology consulted; repeat blood cx drawn 9/7  Home with family assistance anticipated  Outpatient follow-up.   Pt's family to transport  CM to continue to follow    Magy Tobar RN

## 2022-09-08 NOTE — PROGRESS NOTES
3 [facility] Infectious Disease Specialists Progress Note           Lissa Marcus DO    [phone] Office  [phone]  Fax    2022      Assessment & Plan:     E. coli bacteremia. Suspect urinary tract as source of infection. Urine culture growing gram-negative rods. Awaiting identification although I suspect it will be the same E. coli isolated from the blood. Urology has been consulted for evaluation of abnormal CT and ultrasound findings. Narrow antibiotics to Cipro in anticipation of discharge on an oral quinolone antibiotic. Patient is on sertraline so we will need to check EKG in a.m. to confirm there is no QTC prolongation. Patient has tolerated ciprofloxacin in the past without problems. Hopefully home tomorrow however will need to follow-up on status of urology consultation, EKG results, and identification of GNR's in urine  Leukocytosis. Due to above. Resolved. Elevated creatinine. Now within normal limits. Follow   History of sulfa allergy. This caused a rash. Metronidazole and nitrofurantoin cause nausea  Elevated troponin. Follow          Subjective:     No complaints. Objective:     Vitals: Visit Vitals  BP (!) 163/74   Pulse 85   Temp 98.8 °F (37.1 °C)   Resp 16   Ht 5' 4\" (1.626 m)   Wt 131 lb (59.4 kg)   SpO2 92%   BMI 22.49 kg/m²          Tmax:  Temp (24hrs), Av.2 °F (36.8 °C), Min:97.5 °F (36.4 °C), Max:98.8 °F (37.1 °C)      Exam:   Patient is intubated:  no    Physical Examination:   General:  Alert, cooperative, no distress   Head:  Normocephalic, atraumatic. Eyes:  Conjunctivae clear   Neck: Supple       Lungs:   No distress. Chest wall:     Heart:     Abdomen:   non-distended   Extremities: Moves all. Skin: No acute rash on exposed skin   Neurologic:      Labs:        No lab exists for component: ITNL   No results for input(s): CPK, CKMB, TROIQ in the last 72 hours.   Recent Labs     22  0400 22  0400 22  0904 22  0015 09/05/22  1759   * 137  --  138 135*   K 3.8 3.2*  --  3.0* 3.4*    108  --  107 104   CO2 23 22  --  22 20*   BUN 17 23*  --  43* 44*   CREA 0.79 0.92  --  1.59* 1.82*   * 110*  --  149* 187*   PHOS  --   --  2.2*  --   --    MG  --   --  2.1  --  1.7   ALB  --   --   --   --  3.7   WBC 9.5 9.7  --  16.2* 18.3*   HGB 11.9 12.4  --  12.4 13.9   HCT 33.5* 35.6  --  35.2 39.2   PLT 77* 64*  --  65* 82*       No results for input(s): INR, PTP, APTT, INREXT, INREXT in the last 72 hours.   Needs: urine analysis, urine sodium, protein and creatinine  Lab Results   Component Value Date/Time    Sodium,urine random 42 09/06/2022 10:00 AM    Creatinine, urine 88.00 09/06/2022 10:00 AM         Cultures:     No results found for: SDES  Lab Results   Component Value Date/Time    Culture result: NO GROWTH 1 DAY 09/07/2022 04:00 AM    Culture result: (A) 09/05/2022 10:21 PM     GRAM NEGATIVE RODS IDENTIFICATION AND SUSCEPTIBILITY TO FOLLOW    Culture result: (A) 09/05/2022 06:40 PM     ESCHERICHIA COLI GROWING IN BOTH BOTTLES DRAWN (SITE = L ARM) REFER TO [id number] FOR SENSITIVITIES       Radiology:     Medications       Current Facility-Administered Medications   Medication Dose Route Frequency Last Admin    lisinopriL (PRINIVIL, ZESTRIL) tablet 20 mg  20 mg Oral DAILY 20 mg at 09/08/22 0844    loperamide (IMODIUM) capsule 2 mg  2 mg Oral Q4H PRN 2 mg at 09/07/22 0923    heparin (porcine) injection 5,000 Units  5,000 Units SubCUTAneous Q8H 5,000 Units at 09/08/22 1432    sodium chloride (NS) flush 5-10 mL  5-10 mL IntraVENous PRN      piperacillin-tazobactam (ZOSYN) 3.375 g in 0.9% sodium chloride (MBP/ADV) 100 mL MBP  3.375 g IntraVENous Q8H 3.375 g at 09/08/22 0944    ALPRAZolam (XANAX) tablet 0.5 mg  0.5 mg Oral QHS PRN      amLODIPine (NORVASC) tablet 5 mg  5 mg Oral DAILY 5 mg at 09/08/22 0844    pantoprazole (PROTONIX) tablet 40 mg  40 mg Oral ACB 40 mg at 09/08/22 0644    sertraline (ZOLOFT) tablet 100 mg 100 mg Oral DAILY 100 mg at 09/08/22 0844    lactated Ringers infusion  125 mL/hr IntraVENous CONTINUOUS 125 mL/hr at 09/08/22 0159    sodium chloride (NS) flush 5-40 mL  5-40 mL IntraVENous Q8H 10 mL at 09/08/22 1432    sodium chloride (NS) flush 5-40 mL  5-40 mL IntraVENous PRN      acetaminophen (TYLENOL) tablet 650 mg  650 mg Oral Q6H  mg at 09/08/22 1242    Or    acetaminophen (TYLENOL) suppository 650 mg  650 mg Rectal Q6H PRN      polyethylene glycol (MIRALAX) packet 17 g  17 g Oral DAILY PRN      ondansetron (ZOFRAN ODT) tablet 4 mg  4 mg Oral Q8H PRN      Or    ondansetron (ZOFRAN) injection 4 mg  4 mg IntraVENous Q6H PRN             Case discussed with:      Rupal Potter DO

## 2022-09-09 LAB
ATRIAL RATE: 79 BPM
ATRIAL RATE: 82 BPM
BACTERIA SPEC CULT: ABNORMAL
CALCULATED P AXIS, ECG09: 2 DEGREES
CALCULATED P AXIS, ECG09: 5 DEGREES
CALCULATED R AXIS, ECG10: -24 DEGREES
CALCULATED R AXIS, ECG10: -32 DEGREES
CALCULATED T AXIS, ECG11: 13 DEGREES
CALCULATED T AXIS, ECG11: 24 DEGREES
CC UR VC: ABNORMAL
DIAGNOSIS, 93000: NORMAL
DIAGNOSIS, 93000: NORMAL
P-R INTERVAL, ECG05: 142 MS
P-R INTERVAL, ECG05: 142 MS
Q-T INTERVAL, ECG07: 374 MS
Q-T INTERVAL, ECG07: 396 MS
QRS DURATION, ECG06: 70 MS
QRS DURATION, ECG06: 74 MS
QTC CALCULATION (BEZET), ECG08: 436 MS
QTC CALCULATION (BEZET), ECG08: 454 MS
SERVICE CMNT-IMP: ABNORMAL
VENTRICULAR RATE, ECG03: 79 BPM
VENTRICULAR RATE, ECG03: 82 BPM

## 2022-09-09 PROCEDURE — 74011250637 HC RX REV CODE- 250/637: Performed by: INTERNAL MEDICINE

## 2022-09-09 PROCEDURE — 99232 SBSQ HOSP IP/OBS MODERATE 35: CPT | Performed by: INTERNAL MEDICINE

## 2022-09-09 PROCEDURE — 93005 ELECTROCARDIOGRAM TRACING: CPT

## 2022-09-09 PROCEDURE — 74011250636 HC RX REV CODE- 250/636: Performed by: INTERNAL MEDICINE

## 2022-09-09 PROCEDURE — 77030038269 HC DRN EXT URIN PURWCK BARD -A

## 2022-09-09 PROCEDURE — 74011000250 HC RX REV CODE- 250: Performed by: INTERNAL MEDICINE

## 2022-09-09 PROCEDURE — 74011250637 HC RX REV CODE- 250/637: Performed by: HOSPITALIST

## 2022-09-09 PROCEDURE — 74011250636 HC RX REV CODE- 250/636: Performed by: HOSPITALIST

## 2022-09-09 PROCEDURE — 65270000029 HC RM PRIVATE

## 2022-09-09 RX ADMIN — Medication 10 ML: at 20:37

## 2022-09-09 RX ADMIN — LISINOPRIL 20 MG: 20 TABLET ORAL at 09:09

## 2022-09-09 RX ADMIN — HEPARIN SODIUM 5000 UNITS: 5000 INJECTION INTRAVENOUS; SUBCUTANEOUS at 06:35

## 2022-09-09 RX ADMIN — HEPARIN SODIUM 5000 UNITS: 5000 INJECTION INTRAVENOUS; SUBCUTANEOUS at 15:14

## 2022-09-09 RX ADMIN — LOPERAMIDE HYDROCHLORIDE 2 MG: 2 CAPSULE ORAL at 06:26

## 2022-09-09 RX ADMIN — CIPROFLOXACIN 500 MG: 500 TABLET, FILM COATED ORAL at 20:37

## 2022-09-09 RX ADMIN — AMLODIPINE BESYLATE 5 MG: 5 TABLET ORAL at 09:09

## 2022-09-09 RX ADMIN — PANTOPRAZOLE SODIUM 40 MG: 40 TABLET, DELAYED RELEASE ORAL at 07:30

## 2022-09-09 RX ADMIN — SODIUM CHLORIDE, POTASSIUM CHLORIDE, SODIUM LACTATE AND CALCIUM CHLORIDE 125 ML/HR: 600; 310; 30; 20 INJECTION, SOLUTION INTRAVENOUS at 19:43

## 2022-09-09 RX ADMIN — HEPARIN SODIUM 5000 UNITS: 5000 INJECTION INTRAVENOUS; SUBCUTANEOUS at 20:37

## 2022-09-09 RX ADMIN — Medication 10 ML: at 15:14

## 2022-09-09 RX ADMIN — CIPROFLOXACIN 500 MG: 500 TABLET, FILM COATED ORAL at 09:09

## 2022-09-09 RX ADMIN — SERTRALINE HYDROCHLORIDE 100 MG: 50 TABLET ORAL at 09:09

## 2022-09-09 RX ADMIN — Medication 10 ML: at 06:26

## 2022-09-09 RX ADMIN — SODIUM CHLORIDE, POTASSIUM CHLORIDE, SODIUM LACTATE AND CALCIUM CHLORIDE 125 ML/HR: 600; 310; 30; 20 INJECTION, SOLUTION INTRAVENOUS at 06:26

## 2022-09-09 NOTE — PROGRESS NOTES
Bedside and Verbal shift change report given to Rayna, RN (oncoming nurse) by Yeyo Jordan RN (offgoing nurse). Report included the following information SBAR, Kardex, Intake/Output, MAR, and Recent Results.

## 2022-09-09 NOTE — PROGRESS NOTES
[facility] Infectious Disease Specialists Progress Note           Honorio Hodge DO    [phone] Office  [phone]  Fax    2022      Assessment & Plan:     E. coli bacteremia due to urinary tract infection. Urology has been consulted and there are no acute interventions planned. As long as repeat EKG  (not done yet ) reveals normal QTC patient may be discharged on ciprofloxacin 500 mg p.o. twice daily through 2022. Patient is on sertraline so we will need to check EKG to confirm that there is no QTC prolongation with the addition of ciprofloxacin. EKG was ordered stat this morning  Leukocytosis. Due to above. Resolved. Elevated creatinine. Now within normal limits. Follow   History of sulfa allergy. This caused a rash. Metronidazole and nitrofurantoin cause nausea  Elevated troponin. Follow          Subjective:     No complaints. Objective:     Vitals: Visit Vitals  BP (!) 149/65 (BP 1 Location: Left upper arm, BP Patient Position: At rest)   Pulse 88   Temp 99.7 °F (37.6 °C)   Resp 18   Ht 5' 4\" (1.626 m)   Wt 131 lb (59.4 kg)   SpO2 95%   BMI 22.49 kg/m²          Tmax:  Temp (24hrs), Av °F (37.2 °C), Min:98 °F (36.7 °C), Max:99.7 °F (37.6 °C)      Exam:   Patient is intubated:  no    Physical Examination:   General:  Alert, cooperative, no distress   Head:  Normocephalic, atraumatic. Eyes:  Conjunctivae clear   Neck: Supple       Lungs:   No distress. Chest wall:     Heart:     Abdomen:   non-distended   Extremities: Moves all. Skin: No acute rash on exposed skin   Neurologic:      Labs:        No lab exists for component: ITNL   No results for input(s): CPK, CKMB, TROIQ in the last 72 hours.   Recent Labs     22  0400 22  0400   * 137   K 3.8 3.2*    108   CO2 23 22   BUN 17 23*   CREA 0.79 0.92   * 110*   WBC 9.5 9.7   HGB 11.9 12.4   HCT 33.5* 35.6   PLT 77* 64*       No results for input(s): INR, PTP, APTT, INREXT, INREXT in the last 72 hours.  Needs: urine analysis, urine sodium, protein and creatinine  Lab Results   Component Value Date/Time    Sodium,urine random 42 09/06/2022 10:00 AM    Creatinine, urine 88.00 09/06/2022 10:00 AM         Cultures:     No results found for: SDES  Lab Results   Component Value Date/Time    Culture result: NO GROWTH 2 DAYS 09/07/2022 04:00 AM    Culture result: ESCHERICHIA COLI (A) 09/05/2022 10:21 PM    Culture result: (A) 09/05/2022 06:40 PM     ESCHERICHIA COLI GROWING IN BOTH BOTTLES DRAWN (SITE = L ARM) REFER TO [id number] FOR SENSITIVITIES       Radiology:     Medications       Current Facility-Administered Medications   Medication Dose Route Frequency Last Admin    ciprofloxacin HCl (CIPRO) tablet 500 mg  500 mg Oral Q12H 500 mg at 09/09/22 0909    lisinopriL (PRINIVIL, ZESTRIL) tablet 20 mg  20 mg Oral DAILY 20 mg at 09/09/22 0909    loperamide (IMODIUM) capsule 2 mg  2 mg Oral Q4H PRN 2 mg at 09/09/22 0626    heparin (porcine) injection 5,000 Units  5,000 Units SubCUTAneous Q8H 5,000 Units at 09/09/22 0635    sodium chloride (NS) flush 5-10 mL  5-10 mL IntraVENous PRN      ALPRAZolam (XANAX) tablet 0.5 mg  0.5 mg Oral QHS PRN      amLODIPine (NORVASC) tablet 5 mg  5 mg Oral DAILY 5 mg at 09/09/22 0909    pantoprazole (PROTONIX) tablet 40 mg  40 mg Oral ACB 40 mg at 09/09/22 0730    sertraline (ZOLOFT) tablet 100 mg  100 mg Oral DAILY 100 mg at 09/09/22 0909    lactated Ringers infusion  125 mL/hr IntraVENous CONTINUOUS 125 mL/hr at 09/09/22 0626    sodium chloride (NS) flush 5-40 mL  5-40 mL IntraVENous Q8H 10 mL at 09/09/22 0626    sodium chloride (NS) flush 5-40 mL  5-40 mL IntraVENous PRN      acetaminophen (TYLENOL) tablet 650 mg  650 mg Oral Q6H  mg at 09/08/22 2047    Or    acetaminophen (TYLENOL) suppository 650 mg  650 mg Rectal Q6H PRN      polyethylene glycol (MIRALAX) packet 17 g  17 g Oral DAILY PRN      ondansetron (ZOFRAN ODT) tablet 4 mg  4 mg Oral Q8H PRN      Or    ondansetron [facility]) injection 4 mg  4 mg IntraVENous Q6H PRN             Case discussed with: Microbiology lab      Lissa Marcus DO [Negative] : Heme/Lymph

## 2022-09-09 NOTE — PROGRESS NOTES
Problem: Pressure Injury - Risk of  Goal: *Prevention of pressure injury  Description: Document Asad Scale and appropriate interventions in the flowsheet.   Outcome: Progressing Towards Goal  Note: Pressure Injury Interventions:  Sensory Interventions: Assess changes in LOC, Float heels, Keep linens dry and wrinkle-free, Minimize linen layers    Moisture Interventions: Absorbent underpads    Activity Interventions: Increase time out of bed, PT/OT evaluation    Mobility Interventions: Float heels, PT/OT evaluation    Nutrition Interventions: Document food/fluid/supplement intake                     Problem: Patient Education: Go to Patient Education Activity  Goal: Patient/Family Education  Outcome: Progressing Towards Goal

## 2022-09-09 NOTE — PROGRESS NOTES
Transition of Care Plan - RUR 8%:    PT/OT consulted - patient stating she can't walk  ID following - d/c on PO abx  Urology following  CM following - patient lives with spouse, is iADLs and ambulation at baseline        Outpatient follow-up    Lesvia Villarreal LCSW

## 2022-09-09 NOTE — PROGRESS NOTES
Problem: Pressure Injury - Risk of  Goal: *Prevention of pressure injury  Description: Document Asad Scale and appropriate interventions in the flowsheet. Outcome: Progressing Towards Goal  Note: Pressure Injury Interventions:  Sensory Interventions: Assess changes in LOC, Float heels, Keep linens dry and wrinkle-free, Minimize linen layers    Moisture Interventions: Absorbent underpads    Activity Interventions: Increase time out of bed, PT/OT evaluation    Mobility Interventions: Float heels, PT/OT evaluation    Nutrition Interventions: Document food/fluid/supplement intake                     Problem: Patient Education: Go to Patient Education Activity  Goal: Patient/Family Education  Outcome: Progressing Towards Goal     Problem: Falls - Risk of  Goal: *Absence of Falls  Description: Document Michael Fall Risk and appropriate interventions in the flowsheet.   Outcome: Progressing Towards Goal  Note: Fall Risk Interventions:  Mobility Interventions: Bed/chair exit alarm, Patient to call before getting OOB, PT Consult for mobility concerns    Mentation Interventions: Bed/chair exit alarm, Evaluate medications/consider consulting pharmacy    Medication Interventions: Bed/chair exit alarm, Evaluate medications/consider consulting pharmacy, Patient to call before getting OOB, Teach patient to arise slowly, Utilize gait belt for transfers/ambulation    Elimination Interventions: Bed/chair exit alarm, Call light in reach    History of Falls Interventions: Bed/chair exit alarm, Evaluate medications/consider consulting pharmacy, Utilize gait belt for transfer/ambulation         Problem: Patient Education: Go to Patient Education Activity  Goal: Patient/Family Education  Outcome: Progressing Towards Goal     Problem: Pain  Goal: *Control of Pain  Outcome: Progressing Towards Goal     Problem: Fluid Volume - Risk of, Imbalanced  Goal: *Balanced intake and output  Outcome: Progressing Towards Goal

## 2022-09-09 NOTE — PROGRESS NOTES
[facility]  [address] [facility] [address]  [phone]         Hospitalist Progress Note        NAME:  Claudell Peter   :  1945   MRN:  [id number]    Date/Time:  2022     Patient PCP:  Alejandrina Colon MD    Emergency Contact:    Extended Emergency Contact Information  Primary Emergency Contact: KarelPiotr  Address: [address] [facility] [address]           [address] [facility], [address] [place] [address] Street [address] [place] [address] Phone: [phone]  Relation: Spouse  Secondary Emergency Contact: [address]  Mobile Phone: [phone]  Relation: Son  Preferred language: ENGLISH      Code: DNR     Isolation Precautions: There are currently no Active Isolations        Subjective:     REASON FOR VISIT:  Recheck UTI / Bacteremia     HPI & INTERVAL HISTORY:     Ms. Arley Benz is a 65 y.o. female with history that includes recurrent cystitits, anxiety/depression, and HTN presented with dysuria x 10 days, chills and N/V. Found to have UTI and met criteria for sepsis based on HR, Temp, WBC, and UTI. Started on ABXs. E. coli growing in blood cultures and urine culture. : Patient was seen and examined. She reports that she is feeling very weak is unable to walk. Switched over to Cipro. EKG in order to start Cipro showed a normal sinus rhythm at 82 bpm with a QT interval of 436    : Patient seen and examined. She looks and feels better but still with some malaise and poor appetite. : Patient reports feeling better today. Reports feeling less foggy headed and not as weak. Blood cultures appear to be preliminarily E. Coli and urine culture showing GNR. Echo:    Left Ventricle: Normal left ventricular systolic function with a visually estimated EF of 55 - 60%. Left ventricle size is normal. Normal wall thickness. Normal wall motion. Normal diastolic function. Aortic Valve: Mild regurgitation. Mild stenosis of the aortic valve. AV mean gradient is 18 mmHg.     Mitral Valve: Mildly thickened leaflet. Moderate annular calcification of the mitral valve. 9/06: Patient was seen and examined. She reports that after starting antibiotic she is feeling better. Has not had any further episodes of fevers or chills. Has been able to keep down her food as she is no longer nauseated and no longer vomiting. Blood cultures growing gram-negative rods.       ALLERGIES  Allergies   Allergen Reactions    Sulfa (Sulfonamide Antibiotics) Rash    Flagyl [Metronidazole] Nausea Only    Macrobid [Nitrofurantoin Monohyd/M-Cryst] Nausea Only         ROS:  Gen:   Negative, chills but no fever, generalized weakness, and inability to ambulate  Resp:  negative  CVS:   negative  GI:       negative  :     negative           Objective:      Visit Vitals  /72 (BP 1 Location: Left upper arm, BP Patient Position: At rest)   Pulse 77   Temp 98.7 °F (37.1 °C)   Resp 16   Ht 5' 4\" (1.626 m)   Wt 59.4 kg (131 lb)   SpO2 95%   BMI 22.49 kg/m²       Physical Exam:  General: NAD  Head: Normocephalic, without obvious abnormality, atraumatic  Eyes: anicteric sclerae and conjuntiva clear  ENT: lips, mucosa, and tongue normal  Neck: normal, supple, and no tenderness  Lungs: CTA and good breath sounds  Heart: S1, S2 normal, regular rate, and regular rhythm  Abd: not distended, soft, nontender, BS present and normactive  Ext: no cyanosis and no edema  Skin: normal skin color, no rashes, and texture normal  Neuro:  alert, oriented, no defects noted in general exam.  Psych: not anxious, cooperative, appropriate affect      Medications:  Current Facility-Administered Medications   Medication Dose Route Frequency    ciprofloxacin HCl (CIPRO) tablet 500 mg  500 mg Oral Q12H    lisinopriL (PRINIVIL, ZESTRIL) tablet 20 mg  20 mg Oral DAILY    loperamide (IMODIUM) capsule 2 mg  2 mg Oral Q4H PRN    heparin (porcine) injection 5,000 Units  5,000 Units SubCUTAneous Q8H    sodium chloride (NS) flush 5-10 mL  5-10 mL IntraVENous PRN ALPRAZolam (XANAX) tablet 0.5 mg  0.5 mg Oral QHS PRN    amLODIPine (NORVASC) tablet 5 mg  5 mg Oral DAILY    pantoprazole (PROTONIX) tablet 40 mg  40 mg Oral ACB    sertraline (ZOLOFT) tablet 100 mg  100 mg Oral DAILY    lactated Ringers infusion  125 mL/hr IntraVENous CONTINUOUS    sodium chloride (NS) flush 5-40 mL  5-40 mL IntraVENous Q8H    sodium chloride (NS) flush 5-40 mL  5-40 mL IntraVENous PRN    acetaminophen (TYLENOL) tablet 650 mg  650 mg Oral Q6H PRN    Or    acetaminophen (TYLENOL) suppository 650 mg  650 mg Rectal Q6H PRN    polyethylene glycol (MIRALAX) packet 17 g  17 g Oral DAILY PRN    ondansetron (ZOFRAN ODT) tablet 4 mg  4 mg Oral Q8H PRN    Or    ondansetron (ZOFRAN) injection 4 mg  4 mg IntraVENous Q6H PRN        Labs:  Recent Labs     09/08/22  0400   WBC 9.5   HGB 11.9   HCT 33.5*   PLT 77*       Recent Labs     09/08/22  0400   *   K 3.8      CO2 23   *   BUN 17   CREA 0.79   CA 8.7         Radiology:  No results found. I personally reviewed and interpreted the imaging studies and agree with official reading    The labs, imaging studies, and medications was reviewed by me on: September 9, 2022         Assessment/Plan:      Acute E. coli sepsis due to UTI with organ dysfunction (ALBINA) and bacteremia POA:  Sepsis has resolved  Zosyn switched to Cipro based on cultures  Echocardiogram for bacteremia  Monitor labs  ID notes reviewed and appreciated  Urology notes reviewed. No planned procedures this time follow-up as outpatient. Generalized weakness and ambulatory dysfunction  PT and OT consult  May require placement versus home health care    Elevated Troponin:  Unsure of cause possible UTI otherwise asymptomatic  Repeat troponins have been flat with no symptoms Echo unremarkable consider cardiology consult but given the findings do not think will be necessary at this time.      ALBINA:  Resolved with IV fluids  Monitor labs as needed    HTN:  Continue norvasc and lisinopril    Anxiety/Depression:  Cont w/ Zoloft 100mg daily  Cont w/ Xanax 0.5mg qhs     GERD:  Continue PPI    Body mass index is 22.49 kg/m².:  18.5 - 24.9:  Normal weight      Discussed:  Pt's condition, Imaging findings, Lab findings, Assessment, and Care Plan discussed with: Patient, RN, Specialist, and Care Manager    Prophylaxis:  Hep SQ    Probable disposition:  TBD based on hospital course      Total time: -25- minutes **I personally saw and examined the patient during this time period**      Date of service:    9/9/2022                ___________________________________________________    Admitting Physician: Stephanie Levi MD

## 2022-09-10 ENCOUNTER — APPOINTMENT (OUTPATIENT)
Dept: MRI IMAGING | Age: 77
DRG: 872 | End: 2022-09-10
Attending: HOSPITALIST
Payer: MEDICARE

## 2022-09-10 LAB
ANION GAP SERPL CALC-SCNC: 8 MMOL/L (ref 5–15)
BUN SERPL-MCNC: 16 MG/DL (ref 6–20)
BUN/CREAT SERPL: 22 (ref 12–20)
CALCIUM SERPL-MCNC: 8.4 MG/DL (ref 8.5–10.1)
CHLORIDE SERPL-SCNC: 104 MMOL/L (ref 97–108)
CHOLEST SERPL-MCNC: 128 MG/DL
CO2 SERPL-SCNC: 25 MMOL/L (ref 21–32)
CREAT SERPL-MCNC: 0.74 MG/DL (ref 0.55–1.02)
GLUCOSE SERPL-MCNC: 134 MG/DL (ref 65–100)
HDLC SERPL-MCNC: 15 MG/DL
HDLC SERPL: 8.5 {RATIO} (ref 0–5)
LDLC SERPL CALC-MCNC: 63.2 MG/DL (ref 0–100)
MAGNESIUM SERPL-MCNC: 2 MG/DL (ref 1.6–2.4)
POTASSIUM SERPL-SCNC: 3.1 MMOL/L (ref 3.5–5.1)
SODIUM SERPL-SCNC: 137 MMOL/L (ref 136–145)
TRIGL SERPL-MCNC: 249 MG/DL (ref ?–150)
TSH SERPL DL<=0.05 MIU/L-ACNC: 1.02 UIU/ML (ref 0.36–3.74)
VLDLC SERPL CALC-MCNC: 49.8 MG/DL

## 2022-09-10 PROCEDURE — 97112 NEUROMUSCULAR REEDUCATION: CPT | Performed by: OCCUPATIONAL THERAPIST

## 2022-09-10 PROCEDURE — 74011250637 HC RX REV CODE- 250/637: Performed by: HOSPITALIST

## 2022-09-10 PROCEDURE — 97530 THERAPEUTIC ACTIVITIES: CPT | Performed by: OCCUPATIONAL THERAPIST

## 2022-09-10 PROCEDURE — 97530 THERAPEUTIC ACTIVITIES: CPT

## 2022-09-10 PROCEDURE — 74011250637 HC RX REV CODE- 250/637: Performed by: INTERNAL MEDICINE

## 2022-09-10 PROCEDURE — 92610 EVALUATE SWALLOWING FUNCTION: CPT

## 2022-09-10 PROCEDURE — 74011250636 HC RX REV CODE- 250/636: Performed by: RADIOLOGY

## 2022-09-10 PROCEDURE — 97166 OT EVAL MOD COMPLEX 45 MIN: CPT | Performed by: OCCUPATIONAL THERAPIST

## 2022-09-10 PROCEDURE — 2709999900 HC NON-CHARGEABLE SUPPLY

## 2022-09-10 PROCEDURE — 74011250636 HC RX REV CODE- 250/636: Performed by: INTERNAL MEDICINE

## 2022-09-10 PROCEDURE — 70547 MR ANGIOGRAPHY NECK W/O DYE: CPT

## 2022-09-10 PROCEDURE — 72156 MRI NECK SPINE W/O & W/DYE: CPT

## 2022-09-10 PROCEDURE — 80061 LIPID PANEL: CPT

## 2022-09-10 PROCEDURE — 72157 MRI CHEST SPINE W/O & W/DYE: CPT

## 2022-09-10 PROCEDURE — 72158 MRI LUMBAR SPINE W/O & W/DYE: CPT

## 2022-09-10 PROCEDURE — 70544 MR ANGIOGRAPHY HEAD W/O DYE: CPT

## 2022-09-10 PROCEDURE — 83735 ASSAY OF MAGNESIUM: CPT

## 2022-09-10 PROCEDURE — 83090 ASSAY OF HOMOCYSTEINE: CPT

## 2022-09-10 PROCEDURE — 80048 BASIC METABOLIC PNL TOTAL CA: CPT

## 2022-09-10 PROCEDURE — 84443 ASSAY THYROID STIM HORMONE: CPT

## 2022-09-10 PROCEDURE — 97162 PT EVAL MOD COMPLEX 30 MIN: CPT

## 2022-09-10 PROCEDURE — 65270000029 HC RM PRIVATE

## 2022-09-10 PROCEDURE — 36415 COLL VENOUS BLD VENIPUNCTURE: CPT

## 2022-09-10 PROCEDURE — A9575 INJ GADOTERATE MEGLUMI 0.1ML: HCPCS | Performed by: RADIOLOGY

## 2022-09-10 PROCEDURE — 74011000250 HC RX REV CODE- 250: Performed by: INTERNAL MEDICINE

## 2022-09-10 PROCEDURE — 97112 NEUROMUSCULAR REEDUCATION: CPT

## 2022-09-10 PROCEDURE — 74011250636 HC RX REV CODE- 250/636: Performed by: HOSPITALIST

## 2022-09-10 PROCEDURE — 97535 SELF CARE MNGMENT TRAINING: CPT | Performed by: OCCUPATIONAL THERAPIST

## 2022-09-10 RX ORDER — ATORVASTATIN CALCIUM 20 MG/1
80 TABLET, FILM COATED ORAL
Status: DISCONTINUED | OUTPATIENT
Start: 2022-09-10 | End: 2022-09-11

## 2022-09-10 RX ORDER — GADOTERATE MEGLUMINE 376.9 MG/ML
10 INJECTION INTRAVENOUS
Status: COMPLETED | OUTPATIENT
Start: 2022-09-10 | End: 2022-09-10

## 2022-09-10 RX ORDER — GUAIFENESIN 100 MG/5ML
81 LIQUID (ML) ORAL DAILY
Status: DISCONTINUED | OUTPATIENT
Start: 2022-09-11 | End: 2022-09-19 | Stop reason: HOSPADM

## 2022-09-10 RX ADMIN — CIPROFLOXACIN 500 MG: 500 TABLET, FILM COATED ORAL at 09:22

## 2022-09-10 RX ADMIN — ALPRAZOLAM 0.5 MG: 0.5 TABLET ORAL at 00:36

## 2022-09-10 RX ADMIN — CIPROFLOXACIN 500 MG: 500 TABLET, FILM COATED ORAL at 22:27

## 2022-09-10 RX ADMIN — HEPARIN SODIUM 5000 UNITS: 5000 INJECTION INTRAVENOUS; SUBCUTANEOUS at 13:50

## 2022-09-10 RX ADMIN — SERTRALINE HYDROCHLORIDE 100 MG: 50 TABLET ORAL at 09:23

## 2022-09-10 RX ADMIN — HEPARIN SODIUM 5000 UNITS: 5000 INJECTION INTRAVENOUS; SUBCUTANEOUS at 05:46

## 2022-09-10 RX ADMIN — Medication 10 ML: at 22:28

## 2022-09-10 RX ADMIN — SODIUM CHLORIDE, POTASSIUM CHLORIDE, SODIUM LACTATE AND CALCIUM CHLORIDE 125 ML/HR: 600; 310; 30; 20 INJECTION, SOLUTION INTRAVENOUS at 05:49

## 2022-09-10 RX ADMIN — HEPARIN SODIUM 5000 UNITS: 5000 INJECTION INTRAVENOUS; SUBCUTANEOUS at 22:28

## 2022-09-10 RX ADMIN — SODIUM CHLORIDE, POTASSIUM CHLORIDE, SODIUM LACTATE AND CALCIUM CHLORIDE 125 ML/HR: 600; 310; 30; 20 INJECTION, SOLUTION INTRAVENOUS at 14:19

## 2022-09-10 RX ADMIN — AMLODIPINE BESYLATE 5 MG: 5 TABLET ORAL at 09:22

## 2022-09-10 RX ADMIN — GADOTERATE MEGLUMINE 10 ML: 376.9 INJECTION INTRAVENOUS at 19:02

## 2022-09-10 RX ADMIN — LISINOPRIL 20 MG: 20 TABLET ORAL at 09:23

## 2022-09-10 RX ADMIN — Medication 10 ML: at 13:43

## 2022-09-10 RX ADMIN — PANTOPRAZOLE SODIUM 40 MG: 40 TABLET, DELAYED RELEASE ORAL at 05:47

## 2022-09-10 RX ADMIN — ATORVASTATIN CALCIUM 80 MG: 20 TABLET, FILM COATED ORAL at 22:27

## 2022-09-10 NOTE — PROGRESS NOTES
1600 SLP states patient need a Barium swallow esophagram . NPO at [place] [id number] 4 hours. 1800 pt transported for MRI. Redness noted to bottom .  Zinc paste to be applied when pt returns to room

## 2022-09-10 NOTE — PROGRESS NOTES
Bedside and Verbal shift change report given to Lilian Brooks (oncoming nurse) by Dennis Dixon RN (offgoing nurse). Report included the following information SBAR, Kardex, Intake/Output, MAR, and Recent Results.

## 2022-09-10 NOTE — PROGRESS NOTES
9/10/2022  4:35 PM  CM received call back from patient's ESPINOZA, George Avalos ([phone]). He would like CM to pursue SNF placement simultaneously in the event that IPR is not authorized. He also requested to be contacted if further discussions regarding dispo are needed. Pt's spouse, Dawn Gabriel, also asked that CM keep in touch with his brother. Referrals sent via [facility] [employer] to [place] [id number].    4:10 PM  CM noted PT/OT recs for IPR. MD Arielle Cervantes notified. CM reached out to patient's , Dawn Gabriel, and discussed preference. He and patient are agreeable to referrals being sent to any facility that will take their insurance. [facility] [place], awaiting responses. CM inquired if patient and family would be open to pursuing SNF placement if insurance does not auth IPR. Pt's  stated he would need to discuss this with his brother and would call this CM back. 11:18 AM  MD Arielle Cervantes requested CM reach out to PT/OT to see if they are able to eval patient today. She is ready for discharge if she only needs home-based services. CM spoke with Federica Banks - she will plan to see patient today.     Jonn Kent RN

## 2022-09-10 NOTE — PROGRESS NOTES
Problem: Self Care Deficits Care Plan (Adult)  Goal: *Acute Goals and Plan of Care (Insert Text)  Description: FUNCTIONAL STATUS PRIOR TO ADMISSION: per patient and her  via (phone call) patient has had a decline in her mobility over the last ~6 months, does not use adaptive equipment, however per her  she takes \"little steps\" when walking and he stated \"has a hard time walking crisply\" Patient's  reports episodes of legs not functioning at night and inability to move them and as day progresses she can move them again. Both report decreased balance over the last mew months. Patient was active in past and a . HOME SUPPORT: The patient lived with her  but did not require assist. However recent increase in falls and decreased balance    Occupational Therapy Goals  Initiated 9/10/2022  1. Patient will perform grooming with supervision/set-up seated unsupported within 7 day(s). 2.  Patient will maintain static sitting balance with UE support at midline with min multi-modal cues > or = 1 minute within 7 day(s). 3.  Patient will maintain static standing balance with bilateral UE support > or = 1 minute with moderate assist of 1 and min cues within 7 day(s)  4. Patient will perform squat pivot transfer to bedside commode with moderate assist of 1 within 7 day(s). 5.  Patient will position LE's in bridge and elevate hips with verbal cue x's 3 reps to assist with repositioning and toileting in bed within 7 day(s). 6.  Patient will perform cervical ROM and bilateral UE AROM with min cues within 7 day(s).     Outcome: Not Met       OCCUPATIONAL THERAPY EVALUATION  Patient: Julio Sen (66 y.o. female)  Date: 9/10/2022  Primary Diagnosis: UTI (urinary tract infection) [N39.0]       Precautions:   Fall, Skin    ASSESSMENT  Based on the objective data described below, the patient presents with complaint on arrival of worsening balance over the last few months, increased falls, reported feeling like \"her heads off\". On arrival noted to have a flat affect, leaning to left in bed and seated edge of bed with her head tilted to the left throughout, (due to positioning reported increased cervical pain on right side). Patient required mod to max assist of 2 to static stand with posterior loss of balance without initiation of self correction. Patient noted to \"get stuck\" mid task when trying to stand and verbalized frequent concern re: her balance and feeling \"off in her head\" noted left facial droop which was mild in comparison to tongue deviation to the left. At this time unsafe to attempt transfer to chair without LAHTI steady as she was unable to take a step forward or to the side. Of note to verify patient report of PLOF: this therapist called patient's  and he reported patient with decline in mobility in recent months, taking little steps and stated \"hard time walking crisply\", reports episodes of legs not functioning at night    Current Level of Function Impacting Discharge (ADLs/self-care): total assist toileting, max assist LE ADL's, mod to max assist of 2 to stand, unable to take a step    Functional Outcome Measure: The patient scored 20/00 on the Barthel Index outcome measure which is indicative of very dependent. Other factors to consider for discharge: discussed with RN and PT. PT call to MD to inform of above, ? If may benefit from Neuro consult, ? Parkinson like presentation and report of insidious decline in mobility     Patient will benefit from skilled therapy intervention to address the above noted impairments.        PLAN :  Recommendations and Planned Interventions: self care training, functional mobility training, therapeutic exercise, balance training, therapeutic activities, endurance activities, neuromuscular re-education, patient education, home safety training, and family training/education    Frequency/Duration: Patient will be followed by occupational therapy 5 times a week to address goals. Recommendation for discharge: (in order for the patient to meet his/her long term goals)  Therapy 3 hours per day 5-7 days per week    This discharge recommendation:  Has been made in collaboration with the attending provider and/or case management    IF patient discharges home will need the following DME: to be determined       SUBJECTIVE:   Patient stated I can't figure out if its my legs or my head but my balance is not good\"    OBJECTIVE DATA SUMMARY:   HISTORY:   No past medical history on file. No past surgical history on file. Expanded or extensive additional review of patient history:     Home Situation  Home Environment: Private residence  # Steps to Enter: 4  One/Two Story Residence: One story  Living Alone: No  Support Systems: Spouse/Significant Other, Child(helene)  Patient Expects to be Discharged to[de-identified] Unable to determine at this time  Current DME Used/Available at Home: Other (comment) (walking stick for hiking)    Hand dominance: Left    EXAMINATION OF PERFORMANCE DEFICITS:  Cognitive/Behavioral Status:  Neurologic State: Alert  Orientation Level: Oriented to person;Oriented to place;Oriented to situation  Cognition: Decreased command following; Appropriate for age attention/concentration  Perception: Cues to maintain midline in sitting;Cues to attend right visual field  Perseveration: Perseverates during conversation  Safety/Judgement: Fall prevention;Home safety; Insight into deficits    Skin: intact    Edema: right UE proximal to distal, educated on positioning and increased AROM all joints    Hearing:   Auditory  Auditory Impairment: None    Vision/Perceptual:            Corrective Lenses: Glasses    Range of Motion:  AROM: Generally decreased, functional bilateral UE, however noted left UE with decreased shoulder flexion until cued to try and raise as high as her right UE           Strength:    Strength: Generally decreased, functional at elbow and shoulder    Bilateral weak  strength           Coordination:  Coordination: Generally decreased, functional  Fine Motor Skills-Upper: Right Impaired, Left Impaired       Tone & Sensation:    Tone: Normal  Sensation: numbness and tingling in LE's at times per her report        Balance:  Sitting: Without support  Sitting - Static:  (poor initially, fair, then good)  Sitting - Dynamic:  (poor initially, fair with time)  Standing: With support  Standing - Static: Poor  Standing - Dynamic : Poor    Functional Mobility and Transfers for ADLs:  Bed Mobility:  Supine to Sit: Additional time;Maximum assistance; Adaptive equipment;Assist x2;Bed Modified  Scooting: Maximum assistance; Additional time    Transfers:  Sit to Stand: Moderate assistance;Maximum assistance; Additional time;Assist x2  Stand to Sit: Moderate assistance;Maximum assistance; Additional time;Assist x2  Bed to Chair: Total assistance (rashid steady, unable to take step)  Bathroom Mobility: Dependent/total assistance  Toilet Transfer : Total assistance  Educated on need to use Lorice Comes Steady at this time due to inability to take step and decreased static standing balance, provided education on parts of rashid steady, hand placement to pull to stand and noted increased initiation of trunk extension without cues in Lorice Comes steady versus in static standing with rolling walker    Noted in attempt to stand one trial she appeared to get stuck mid transfer and had to return to sitting     ADL Assessment:  Feeding: Setup    Oral Facial Hygiene/Grooming: Minimum assistance    Bathing: Maximum assistance    Type of Bath: Chlorhexidine (CHG)    Upper Body Dressing: Maximum assistance    Lower Body Dressing: Total assistance (initiated crossing leg)    Toileting:  Total assistance (pur-wic)          ADL Intervention and task modifications:        Educated on role of OT, benefit of increased activity    Seated edge of bed instructed on techniques to increase static sitting balance using UE's noted gaze preference to the left and head tilted to the left, patient rubbing right side of her neck due to increased discomfort and instructed on need to reposition at midline due to overuse of cervical muscles    Instructed on body mechanics and need to pull feet back prior to stand, instructed on positioning of hands to push to stand. Required max cues and physical assist to facilitate increased anterior pelvic tilt and thoracic extension in effort to improve balance. Patient with forward flexed trunk, head deviated to left and posterior loss of balance without initiation of correction, cues to push toes into floor as weight on heels and toes lifted    Instructed on techniques to facilitate increased midline awareness using vision to self correct to bring head to midline due to tilt to left     Educated on use of call bell               Cognitive Retraining  Safety/Judgement: Fall prevention;Home safety; Insight into deficits      Therapeutic Exercise: Instructed in performance of cervical ROM with focus on lateral tilt to right due to preference to maintain tilt to left at all times and increased cervical stiffness and pain right side of neck     Functional Measure:    Barthel Index:  Bathin  Bladder: 0  Bowels: 5  Groomin  Dressin  Feedin  Mobility: 0  Stairs: 0  Toilet Use: 5  Transfer (Bed to Chair and Back): 5  Total: 20/100      The Barthel ADL Index: Guidelines  1. The index should be used as a record of what a patient does, not as a record of what a patient could do. 2. The main aim is to establish degree of independence from any help, physical or verbal, however minor and for whatever reason. 3. The need for supervision renders the patient not independent. 4. A patient's performance should be established using the best available evidence.  Asking the patient, friends/relatives and nurses are the usual sources, but direct observation and common sense are also important. However direct testing is not needed. 5. Usually the patient's performance over the preceding 24-48 hours is important, but occasionally longer periods will be relevant. 6. Middle categories imply that the patient supplies over 50 per cent of the effort. 7. Use of aids to be independent is allowed. Score Interpretation (from [address])    Independent   60-79 Minimally independent   40-59 Partially dependent   20-39 Very dependent   <20 Totally dependent     -Matti Ivey., Barthel, DAlinaW. (1965). Functional evaluation: the Barthel Index. [address] ([address] (1997). The Barthel activities of daily living index: self-reporting versus actual performance in the old (> or = 75 years). Journal [address] 45(7), 14 [facility], JCHIDI, Kassidy Moore., May Adin. (1999). Measuring the change in disability after inpatient rehabilitation; comparison of the responsiveness of the Barthel Index and Functional [place] Measure. Journal of Neurology, Neurosurgery, and Psychiatry, 66(4), 180-155. Lucy Uriarte, N.J.A, RIN Samaniego, & Leslie Preston, MAlinaA. (2004) Assessment of post-stroke quality of life in cost-effectiveness studies: The usefulness of the Barthel Index and the EuroQoL-5D. Quality of Life Research, 15, 000-13         Occupational Therapy Evaluation Charge Determination   History Examination Decision-Making   HIGH Complexity : Extensive review of history including physical, cognitive and psychosocial history  HIGH Complexity : 5 or more performance deficits relating to physical, cognitive , or psychosocial skils that result in activity limitations and / or participation restrictions HIGH Complexity : Patient presents with comorbidities that affect occupational performance.  Signifigant modification of tasks or assistance (eg, physical or verbal) with assessment (s) is necessary to enable patient to complete evaluation       Based on the above components, the patient evaluation is determined to be of the following complexity level: HIGH   Pain Rating:  Not rated, report of pain right side of neck and rubbing at times    Activity Tolerance:   Good    After treatment patient left in no apparent distress:    Sitting in chair, Call bell within reach, and Bed / chair alarm activated    COMMUNICATION/EDUCATION:   The patients plan of care was discussed with: Physical therapist, Registered nurse, and Physician. Home safety education was provided and the patient/caregiver indicated understanding. and Patient/family have participated as able in goal setting and plan of care. This patients plan of care is appropriate for delegation to GERARDO.     Thank you for this referral.  Terrance Johnson OTR/L  Time Calculation: 63 mins

## 2022-09-10 NOTE — PROGRESS NOTES
Problem: Mobility Impaired (Adult and Pediatric)  Goal: *Acute Goals and Plan of Care (Insert Text)  Description: FUNCTIONAL STATUS PRIOR TO ADMISSION: Pt reports impaired balance and several falls in recent months. She reports ambulating without assistive device and frequently feels as though her Sung Cerrato is off\" but is unable to offer further description. Some pt reports are inconsistent over course of encounter, including duration of R proximal leg pain. Per OT following OT conversation with spouse pt has experienced limited mobility x 2-3 years with increased decline over past 6 months. She ambulates with shuffling gait and no assistive device. She reportedly experiences episodes of limited gross motor abilities and inability to ambulate with increased difficulty in mornings vs afternoons. HOME SUPPORT PRIOR TO ADMISSION: The patient lived with spouse who reports concern regarding ability to care for patient at home with current impairments. Physical Therapy Goals  Initiated 9/10/2022  1. Patient will move from supine to sit and sit to supine  in bed with moderate assistance  within 7 day(s). 2.  Patient will transfer from bed to chair and chair to bed with moderate assistance  using the least restrictive device within 7 day(s). 3.  Patient will perform sit to stand with moderate assistance 3/3 trials within 7 day(s). 4.  Patient will ambulate with moderate assistance  for 10 feet with the least restrictive device within 7 day(s). 5.  Patient will perform static and dynamic sitting activities x 10 mins with supervision within 7 days.     Outcome: Not Met    PHYSICAL THERAPY EVALUATION  Patient: Halle Junior (4 y.o. female)  Date: 9/10/2022  Primary Diagnosis: UTI (urinary tract infection) [N39.0]       Precautions:   Fall, Skin    ASSESSMENT  Based on the objective data described below, the patient presents with bradykinesia, pauses during activity, decreased general attention, L lean and L cervical sidebending at rest and with initial sitting, impaired siting and standing balance, decreased endurance, decreased insight into deficits, and very limited functional mobility on day 5 of admission with UTI. She reports somewhat unclear history but spouse offers clarification to OT via phone call. Pt requires up to max A x 2 for some transfers, stands with persistent posterior lean, and is unable to demonstrate functional steps today. After repeated transfer training and standing balance activities she performs bed to chair transfer via Ronaldo Loi. Once pt seated in chair she is eventually able to achieve neutral cervical spine positioning. With correction for gravity and asymmetry noted persistent L facial droop and L tongue deviation (unable to clearly assess given positioning impairments prior to this point). RN and attending provider notified immediately. Attending provider reports intent to order further imaging based on PT reports and no code S as symptoms are not new. RN advised regarding limited sitting time for patient and Anneliese Carp steady +assist x 2 for return to bed. Current Level of Function Impacting Discharge (mobility/balance): total A bed to chair    Functional Outcome Measure: The patient scored 20 on the Barthel outcome measure which is indicative of very dependent status. Other factors to consider for discharge: none additional     Patient will benefit from skilled therapy intervention to address the above noted impairments. PLAN :  Recommendations and Planned Interventions: bed mobility training, transfer training, gait training, therapeutic exercises, neuromuscular re-education, edema management/control, patient and family training/education, and therapeutic activities      Frequency/Duration: Patient will be followed by physical therapy:  5 times a week to address goals.     Recommendation for discharge: (in order for the patient to meet his/her long term goals)  Therapy up to 3 hours per day 5-7 days per week; plan to further assess pt activity tolerance and response to interventions with ongoing treatments. This discharge recommendation:  Has been made in collaboration with the attending provider and/or case management    IF patient discharges home will need the following DME: stretcher transport, HHPT, 24-hour assistance. SUBJECTIVE:   Patient stated I lose my balance when my head goes off.     Pt received supine, agreeable to PT and cleared by RN. Dr. Kristin Cano rounds prior to pt mobility. Pt resting with L lean, L cervical side-bending. OBJECTIVE DATA SUMMARY:   HISTORY:    No past medical history on file. No past surgical history on file. Personal factors and/or comorbidities impacting plan of care: frequent falls, UTI    Home Situation  Home Environment: Private residence  # Steps to Enter: 4  One/Two Story Residence: One story  Living Alone: No  Support Systems: Spouse/Significant Other, Child(helene)  Patient Expects to be Discharged to[de-identified] Unable to determine at this time  Current DME Used/Available at Home: Other (comment) (walking stick for hiking)    EXAMINATION/PRESENTATION/DECISION MAKING:   Critical Behavior:  Neurologic State: Alert  Orientation Level: Oriented to person, Oriented to place, Oriented to situation  Cognition: Decreased command following, Appropriate for age attention/concentration  Safety/Judgement: Fall prevention, Home safety, Insight into deficits  Hearing: Auditory  Auditory Impairment: None  Skin:  mottled distal LE skin  Edema: none noted LEs;   Range Of Motion:  AROM: Generally decreased, functional            States R thigh pain with AROM, initially reports onset \"recently\" but unable to elaborate.  Later states pain present for \"years\"           Strength:    Strength: Generally decreased, functional   4+/5 BLE ankle dorsiflexion  4-/5 L hip flexion  At least 3+/5 R hip flexion with assessment limited by complaints of R thigh pain as above                 Tone & Sensation:   Tone: Normal                              Coordination:  Coordination: Generally decreased, functional  Vision:   Corrective Lenses: Glasses  Functional Mobility:  Bed Mobility:     Supine to Sit: Additional time;Maximum assistance; Adaptive equipment;Assist x2;Bed Modified     Scooting: Maximum assistance; Additional time  Transfers:  Sit to Stand: Moderate assistance;Maximum assistance; Additional time;Assist x2; performs; cues for UE; placement, anterior lean, upright posture; occasional freezing during attempts. Stand to Sit: Moderate assistance;Maximum assistance; Additional time;Assist x2        Bed to Chair: Total assistance (rashid steady, unable to take step)           Other: bed > chair total A via rashid steady with goo tolerance  Balance:   Sitting: Without support  Sitting - Static:  (poor initially, fair, then good)  Sitting - Dynamic:  (poor initially, fair with time)  Standing: With support  Standing - Static: Poor  Standing - Dynamic : Poor  Ambulation/Gait Training:  Distance (ft):  (1 step forward and back)  Assistive Device: Walker, rolling;Gait belt  Ambulation - Level of Assistance: Maximum assistance; Additional time;Assist x2        Gait Abnormalities: Decreased step clearance (10% step through)        Base of Support: Widened                                         Therapeutic Exercises: Ankle pumps x 10  SLR x 10    Functional Measure:  Barthel Index:    Bathin  Bladder: 0  Bowels: 5  Groomin  Dressin  Feedin  Mobility: 0  Stairs: 0  Toilet Use: 5  Transfer (Bed to Chair and Back): 5  Total: 20/100       The Barthel ADL Index: Guidelines  1. The index should be used as a record of what a patient does, not as a record of what a patient could do. 2. The main aim is to establish degree of independence from any help, physical or verbal, however minor and for whatever reason.   3. The need for supervision renders the patient not independent. 4. A patient's performance should be established using the best available evidence. Asking the patient, friends/relatives and nurses are the usual sources, but direct observation and common sense are also important. However direct testing is not needed. 5. Usually the patient's performance over the preceding 24-48 hours is important, but occasionally longer periods will be relevant. 6. Middle categories imply that the patient supplies over 50 per cent of the effort. 7. Use of aids to be independent is allowed. Score Interpretation (from [address] [address])    Independent   60-79 Minimally independent   40-59 Partially dependent   20-39 Very dependent   <20 Totally dependent     -Matti Ivey., Barthel, D.W. (1965). Functional evaluation: the Barthel Index. [address] ([address] (1997). The Barthel activities of daily living index: self-reporting versus actual performance in the old (> or = 75 years). Journal of [address] [place] [address] 45(7), 14 [facility], AlinaJOAN, Bradford Betts., Jay Tidwell. (1999). Measuring the change in disability after inpatient rehabilitation; comparison of the responsiveness of the Barthel Index and Functional [place] Measure. Journal of Neurology, Neurosurgery, and Psychiatry, 66(4), 469-874. Yanick Herrera, N.J.A, RIN Samaniego, & Amarilys Lainez MFABIANA. (2004) Assessment of post-stroke quality of life in cost-effectiveness studies: The usefulness of the Barthel Index and the EuroQoL-5D.  Quality of Life Research, 15, 282-83            Physical Therapy Evaluation Charge Determination   History Examination Presentation Decision-Making   HIGH Complexity :3+ comorbidities / personal factors will impact the outcome/ POC  HIGH Complexity : 4+ Standardized tests and measures addressing body structure, function, activity limitation and / or participation in recreation  MEDIUM Complexity : Evolving with changing characteristics  Other outcome measures barthel  HIGH       Based on the above components, the patient evaluation is determined to be of the following complexity level: MEDIUM    Pain Rating:  States intermittent R thigh pain with AROM and intermittent R neck pain with AROM. Does not rate; improves at rest.    Activity Tolerance:   requires rest breaks  VSS  After treatment patient left in no apparent distress:   Sitting in chair, Call bell within reach, and Bed / chair alarm activated    COMMUNICATION/EDUCATION:   The patients plan of care was discussed with: Occupational therapist, Registered nurse, Physician, and Case management. Discussed pt presentation, objective findings (including L lean, L facial droop and L tongue deviation), and recommendation for neurology consult with JOSHUA Hayes and Dr. Kristin Cano via telephone at 13:33 pm.    Fall prevention education was provided and the patient/caregiver indicated understanding., Patient/family have participated as able in goal setting and plan of care. , and Patient/family agree to work toward stated goals and plan of care.     Thank you for this referral.  Ima Dancer, PT, DPT   Time Calculation: 55 mins

## 2022-09-10 NOTE — PROGRESS NOTES
Physical therapy states L facial droop . L tongue deviation. Nursing assessment by staff nurses showed no evidence of stroke like activities throughout the course of the day, Patient presents at baseline.

## 2022-09-10 NOTE — PROGRESS NOTES
Problem: Pressure Injury - Risk of  Goal: *Prevention of pressure injury  Description: Document Asad Scale and appropriate interventions in the flowsheet. Outcome: Progressing Towards Goal  Note: Pressure Injury Interventions:  Sensory Interventions: Assess changes in LOC, Keep linens dry and wrinkle-free, Maintain/enhance activity level    Moisture Interventions: Absorbent underpads, Check for incontinence Q2 hours and as needed, Internal/External urinary devices    Activity Interventions: Increase time out of bed    Mobility Interventions: Assess need for specialty bed, HOB 30 degrees or less    Nutrition Interventions: Document food/fluid/supplement intake, Offer support with meals,snacks and hydration    Friction and Shear Interventions: HOB 30 degrees or less                Problem: Patient Education: Go to Patient Education Activity  Goal: Patient/Family Education  Outcome: Progressing Towards Goal     Problem: Falls - Risk of  Goal: *Absence of Falls  Description: Document Michael Fall Risk and appropriate interventions in the flowsheet.   Outcome: Progressing Towards Goal  Note: Fall Risk Interventions:  Mobility Interventions: Bed/chair exit alarm, Patient to call before getting OOB    Mentation Interventions: Bed/chair exit alarm, Increase mobility    Medication Interventions: Bed/chair exit alarm, Patient to call before getting OOB, Teach patient to arise slowly    Elimination Interventions: Bed/chair exit alarm, Call light in reach, Patient to call for help with toileting needs    History of Falls Interventions: Bed/chair exit alarm         Problem: Patient Education: Go to Patient Education Activity  Goal: Patient/Family Education  Outcome: Progressing Towards Goal     Problem: Pain  Goal: *Control of Pain  Outcome: Progressing Towards Goal     Problem: Fluid Volume - Risk of, Imbalanced  Goal: *Balanced intake and output  Outcome: Progressing Towards Goal

## 2022-09-10 NOTE — PROGRESS NOTES
0030: PCT called primary RN to bedside as patient has pulled out IV in R hand. Patient stated she saw a woman with a white dog and a black woman come in her room. She reports difficulty falling asleep and has had very little sleep since being admitted into the hospital. Patient is alert and oriented x 4.

## 2022-09-10 NOTE — PROGRESS NOTES
[facility]  [address] [facility], [facility] [address]  [phone]         Hospitalist Progress Note        NAME:  Mi Joya   :  1945   MRN:  [id number]    Date/Time:  9/10/2022     Patient PCP:  Farzaneh Schofield MD    Emergency Contact:    Extended Emergency Contact Information  Primary Emergency Contact: KarelPiotr  Address: Aaliyah Bowers [address] [facility] Phone: [phone]  Relation: Spouse  Secondary Emergency Contact: [address]  Mobile Phone: [phone]  Relation: Son  Preferred language: ENGLISH      Code: DNR     Isolation Precautions: There are currently no Active Isolations        Subjective:     REASON FOR VISIT:  Recheck UTI / Bacteremia     HPI & INTERVAL HISTORY:     Ms. Mayela Hu is a 65 y.o. female with history that includes recurrent cystitits, anxiety/depression, and HTN presented with dysuria x 10 days, chills and N/V. Found to have UTI and met criteria for sepsis based on HR, Temp, WBC, and UTI. Started on ABXs. E. coli growing in blood cultures and urine culture. 9/10: Patient being seen by physical therapy who is concerned with apparent ongoing and worsening neurological findings. Per  she has had a shuffling gait. Patient continues to report difficulty with walking. Otherwise no acute events overnight and no new complaints. : Patient was seen and examined. She reports that she is feeling very weak is unable to walk. Switched over to Cipro. EKG in order to start Cipro showed a normal sinus rhythm at 82 bpm with a QT interval of 436    : Patient seen and examined. She looks and feels better but still with some malaise and poor appetite. : Patient reports feeling better today. Reports feeling less foggy headed and not as weak. Blood cultures appear to be preliminarily E. Coli and urine culture showing GNR.   Echo:    Left Ventricle: Normal left ventricular systolic function with a visually estimated EF of 55 - 60%. Left ventricle size is normal. Normal wall thickness. Normal wall motion. Normal diastolic function. Aortic Valve: Mild regurgitation. Mild stenosis of the aortic valve. AV mean gradient is 18 mmHg. Mitral Valve: Mildly thickened leaflet. Moderate annular calcification of the mitral valve. 9/06: Patient was seen and examined. She reports that after starting antibiotic she is feeling better. Has not had any further episodes of fevers or chills. Has been able to keep down her food as she is no longer nauseated and no longer vomiting. Blood cultures growing gram-negative rods.       ALLERGIES  Allergies   Allergen Reactions    Sulfa (Sulfonamide Antibiotics) Rash    Flagyl [Metronidazole] Nausea Only    Macrobid [Nitrofurantoin Monohyd/M-Cryst] Nausea Only         ROS:  Gen:   generalized weakness and inability to ambulate  Resp:  negative  CVS:   negative  GI:       negative  :     negative           Objective:      Visit Vitals  /78 (BP 1 Location: Left upper arm, BP Patient Position: At rest)   Pulse 76   Temp 98.2 °F (36.8 °C)   Resp 15   Ht 5' 4\" (1.626 m)   Wt 59.4 kg (131 lb)   SpO2 98%   BMI 22.49 kg/m²       Physical Exam:  General: Patient is in no acute distress  Head: Normocephalic, without obvious abnormality, atraumatic  Eyes: anicteric sclerae and conjuntiva clear  ENT: lips, mucosa, and tongue normal  Neck: normal, supple, and no tenderness  Lungs: CTA  Heart: S1, S2 normal, regular rate, and regular rhythm  Abd: not distended, soft, nontender, BS present and normactive  Ext: no cyanosis and no edema  Skin: normal skin color, no rashes, and texture normal  Neuro:  alert, oriented, no defects noted in general exam.  Psych: not anxious, cooperative, appropriate affect      Medications:  Current Facility-Administered Medications   Medication Dose Route Frequency    ciprofloxacin HCl (CIPRO) tablet 500 mg  500 mg Oral Q12H    lisinopriL (PRINIVIL, ZESTRIL) tablet 20 mg  20 mg Oral DAILY    loperamide (IMODIUM) capsule 2 mg  2 mg Oral Q4H PRN    heparin (porcine) injection 5,000 Units  5,000 Units SubCUTAneous Q8H    sodium chloride (NS) flush 5-10 mL  5-10 mL IntraVENous PRN    ALPRAZolam (XANAX) tablet 0.5 mg  0.5 mg Oral QHS PRN    amLODIPine (NORVASC) tablet 5 mg  5 mg Oral DAILY    pantoprazole (PROTONIX) tablet 40 mg  40 mg Oral ACB    sertraline (ZOLOFT) tablet 100 mg  100 mg Oral DAILY    lactated Ringers infusion  125 mL/hr IntraVENous CONTINUOUS    sodium chloride (NS) flush 5-40 mL  5-40 mL IntraVENous Q8H    sodium chloride (NS) flush 5-40 mL  5-40 mL IntraVENous PRN    acetaminophen (TYLENOL) tablet 650 mg  650 mg Oral Q6H PRN    Or    acetaminophen (TYLENOL) suppository 650 mg  650 mg Rectal Q6H PRN    polyethylene glycol (MIRALAX) packet 17 g  17 g Oral DAILY PRN    ondansetron (ZOFRAN ODT) tablet 4 mg  4 mg Oral Q8H PRN    Or    ondansetron (ZOFRAN) injection 4 mg  4 mg IntraVENous Q6H PRN        Labs:  Recent Labs     09/08/22  0400   WBC 9.5   HGB 11.9   HCT 33.5*   PLT 77*       Recent Labs     09/08/22  0400   *   K 3.8      CO2 23   *   BUN 17   CREA 0.79   CA 8.7         Radiology:  No results found. I personally reviewed and interpreted the imaging studies and agree with official reading    The labs, imaging studies, and medications was reviewed by me on: September 10, 2022         Assessment/Plan:      Acute E. coli sepsis due to UTI with organ dysfunction (ALBINA) and bacteremia POA:  Sepsis has resolved  Continue cipro total of 14 days of abxs because of bacteremia  Echocardiogram unremarkable  ID on consult  Urology notes reviewed. No planned procedures this time follow-up as outpatient.     Generalized weakness and ambulatory dysfunction along with ongoing neurological deficits to include facial droop and shuffling gait  PT and OT consult note appreciated  Requires inpatient rehab versus SNF. PT does not feel the patient is safe for home discharge. Will check CT of the head along with MRI of the head with MRA. No code stroke given these are old findings. Consult neurology. Continue with PT OT.     Elevated Troponin:  Unsure of cause possible UTI otherwise asymptomatic  Repeat troponins have been flat with no symptoms Echo unremarkable   No further w/u     ALBINA:  Resolved   Monitor labs as needed    HTN:  Continue norvasc and lisinopril    Anxiety/Depression:  Cont w/ Zoloft 100mg daily  Cont w/ Xanax 0.5mg qhs     GERD:  Continue PPI    Body mass index is 22.49 kg/m².:  18.5 - 24.9:  Normal weight      Discussed:  Pt's condition, Imaging findings, Lab findings, Assessment, and Care Plan discussed with: Patient, Spouse via phone, RN, Specialist, Care Manager, and PT/OT    Prophylaxis:  Hep SQ    Probable disposition: IPR      Total time: 40 minutes **I personally saw and examined the patient during this time period**      Date of service:    9/10/2022                ___________________________________________________    Admitting Physician: Jen Ely MD

## 2022-09-10 NOTE — PROGRESS NOTES
Problem: Dysphagia (Adult)  Goal: *Acute Goals and Plan of Care (Insert Text)  Description: 9/10/2022  Speech path goals  1. Patient will participate with imaging of swallow as indicated. 2. Patient will tolerate reg/thins with no overt s/s of aspiration. Outcome: Not Met   SPEECH LANGUAGE PATHOLOGY BEDSIDE SWALLOW EVALUATION  Patient: Yolande Hurtado (23 y.o. female)  Date: 9/10/2022  Primary Diagnosis: UTI (urinary tract infection) [N39.0]       Precautions:   Fall, Skin    ASSESSMENT :  Based on the objective data described below, the patient presents with suspected esophageal dysphagia due to her throat clearing with po. She reported she has had an endoscopy and the physician stated she had reflux. Cannot rule out pharyngeal dysphagia as the cause of the throat clearing but recommended a Barium swallow to assess for GERD. Asked the physician to complete that via perfect serve. She has a flat R NLF and min lingual deviation to the L with protrusion. Speech was intelligible but ? Minimally imprecise intermittently. Reduced eye contact to the R noted. Fairly flat affect. Patient will benefit from skilled intervention to address the above impairments. Patients rehabilitation potential is considered to be Good     PLAN :  Recommendations and Planned Interventions:  Recommend barium swallow swallow to assess esophageal phase of the swallow   Will follow up Monday. May benefit from [facility] if barium swallow is not helpful. Will not change diet for now. Frequency/Duration: Patient will be followed by speech-language pathology 2 times a week to address goals. Discharge Recommendations: To Be Determined     SUBJECTIVE:   Patient stated she had an endoscopy in the past with Dr. Jin Boyd. Records not found. OBJECTIVE:   No past medical history on file. No past surgical history on file.   Prior Level of Function/Home Situation:   Home Situation  Home Environment: Private residence  # Steps to Enter: 4  One/Two Story Residence: One story  Living Alone: No  Support Systems: Spouse/Significant Other, Child(helene)  Patient Expects to be Discharged to[de-identified] Unable to determine at this time  Current DME Used/Available at Home: Other (comment) (walking stick for hiking)  Diet prior to admission:   Current Diet:  reg/thins    Cognitive and Communication Status:  Neurologic State: Alert  Orientation Level: Oriented to person, Oriented to place, Disoriented to place  Cognition: Follows commands  Perception: Cues to attend right visual field  Perseveration: Perseverates during conversation  Safety/Judgement: Fall prevention, Home safety, Insight into deficits  Oral Assessment:  Oral Assessment  Labial:  (flattened R NLF)  Dentition: Natural;Full  Lingual: Left deviation (min deviation of her tongue to the L)  Mandible: No impairment  P.O. Trials:  Patient Position: upright in bed  Vocal quality prior to P.O.: No impairment  Consistency Presented: Thin liquid;Puree;Mechanical soft  How Presented: Self-fed/presented;Straw;SLP-fed/presented;Spoon     Bolus Acceptance: No impairment  Bolus Formation/Control: Impaired  Type of Impairment: Delayed (oral holding occasionally)  Propulsion: No impairment  Oral Residue: None  Initiation of Swallow: Delayed (# of seconds)  Laryngeal Elevation: Functional  Aspiration Signs/Symptoms: Clear throat (cleared her throat frequently after thins and purees.)                Oral Phase Severity: Mild  Pharyngeal Phase Severity : Mild    NOMS:   The NOMS functional outcome measure was used to quantify this patient's level of swallowing impairment. Based on the NOMS, the patient was determined to be at level 5 for swallow function       NOMS Swallowing Levels:  Level 1 (CN): NPO  Level 2 (CM): NPO but takes consistency in therapy  Level 3 (CL): Takes less than 50% of nutrition p.o. and continues with nonoral feedings; and/or safe with mod cues; and/or max diet restriction  Level 4 (CK):  Safe swallow but needs mod cues; and/or mod diet restriction; and/or still requires some nonoral feeding/supplements  Level 5 (CJ): Safe swallow with min diet restriction; and/or needs min cues  Level 6 (CI): Independent with p.o.; rare cues; usually self cues; may need to avoid some foods or needs extra time  Level 7 ([facility]): Independent for all p.o.  REINA. (2003). National Outcomes Measurement System (NOMS): Adult Speech-Language Pathology User's Guide. Pain:             After treatment:   Patient left in no apparent distress in bed    COMMUNICATION/EDUCATION:   Patient was educated regarding her deficit(s) of dysphagia    The patient's plan of care including recommendations, planned interventions, and recommended diet changes were discussed with: Registered nurse. Patient/family have participated as able in goal setting and plan of care.     Thank you for this referral.  Eva Zhang, SLP  Time Calculation: 15 mins

## 2022-09-11 ENCOUNTER — APPOINTMENT (OUTPATIENT)
Dept: MRI IMAGING | Age: 77
DRG: 872 | End: 2022-09-11
Attending: PSYCHIATRY & NEUROLOGY
Payer: MEDICARE

## 2022-09-11 LAB — HCYS SERPL-SCNC: 11 UMOL/L (ref 3.7–13.9)

## 2022-09-11 PROCEDURE — 99223 1ST HOSP IP/OBS HIGH 75: CPT | Performed by: PSYCHIATRY & NEUROLOGY

## 2022-09-11 PROCEDURE — 74011250637 HC RX REV CODE- 250/637: Performed by: PSYCHIATRY & NEUROLOGY

## 2022-09-11 PROCEDURE — 74011000250 HC RX REV CODE- 250: Performed by: INTERNAL MEDICINE

## 2022-09-11 PROCEDURE — 77030038269 HC DRN EXT URIN PURWCK BARD -A

## 2022-09-11 PROCEDURE — 74011250637 HC RX REV CODE- 250/637: Performed by: INTERNAL MEDICINE

## 2022-09-11 PROCEDURE — 74011250637 HC RX REV CODE- 250/637: Performed by: HOSPITALIST

## 2022-09-11 PROCEDURE — 65270000029 HC RM PRIVATE

## 2022-09-11 PROCEDURE — 74011250636 HC RX REV CODE- 250/636: Performed by: HOSPITALIST

## 2022-09-11 PROCEDURE — 70551 MRI BRAIN STEM W/O DYE: CPT

## 2022-09-11 RX ORDER — THERA TABS 400 MCG
1 TAB ORAL DAILY
Status: DISCONTINUED | OUTPATIENT
Start: 2022-09-11 | End: 2022-09-19 | Stop reason: HOSPADM

## 2022-09-11 RX ORDER — MICONAZOLE NITRATE 2 %
POWDER (GRAM) TOPICAL 2 TIMES DAILY
Status: DISCONTINUED | OUTPATIENT
Start: 2022-09-11 | End: 2022-09-19 | Stop reason: HOSPADM

## 2022-09-11 RX ORDER — POTASSIUM CHLORIDE 750 MG/1
40 TABLET, FILM COATED, EXTENDED RELEASE ORAL
Status: DISPENSED | OUTPATIENT
Start: 2022-09-11 | End: 2022-09-11

## 2022-09-11 RX ORDER — PHENAZOPYRIDINE HYDROCHLORIDE 100 MG/1
100 TABLET, FILM COATED ORAL
Status: COMPLETED | OUTPATIENT
Start: 2022-09-11 | End: 2022-09-13

## 2022-09-11 RX ORDER — ENOXAPARIN SODIUM 100 MG/ML
40 INJECTION SUBCUTANEOUS EVERY 24 HOURS
Status: DISCONTINUED | OUTPATIENT
Start: 2022-09-12 | End: 2022-09-19 | Stop reason: HOSPADM

## 2022-09-11 RX ORDER — ASPIRIN 325 MG/1
100 TABLET, FILM COATED ORAL DAILY
Status: DISCONTINUED | OUTPATIENT
Start: 2022-09-11 | End: 2022-09-19 | Stop reason: HOSPADM

## 2022-09-11 RX ORDER — FOLIC ACID 1 MG/1
1 TABLET ORAL DAILY
Status: DISCONTINUED | OUTPATIENT
Start: 2022-09-11 | End: 2022-09-19 | Stop reason: HOSPADM

## 2022-09-11 RX ADMIN — HEPARIN SODIUM 5000 UNITS: 5000 INJECTION INTRAVENOUS; SUBCUTANEOUS at 06:33

## 2022-09-11 RX ADMIN — LOPERAMIDE HYDROCHLORIDE 2 MG: 2 CAPSULE ORAL at 20:43

## 2022-09-11 RX ADMIN — CIPROFLOXACIN 500 MG: 500 TABLET, FILM COATED ORAL at 20:43

## 2022-09-11 RX ADMIN — THIAMINE HCL TAB 100 MG 100 MG: 100 TAB at 13:59

## 2022-09-11 RX ADMIN — AMLODIPINE BESYLATE 5 MG: 5 TABLET ORAL at 10:55

## 2022-09-11 RX ADMIN — POTASSIUM CHLORIDE 40 MEQ: 750 TABLET, FILM COATED, EXTENDED RELEASE ORAL at 17:19

## 2022-09-11 RX ADMIN — THERA TABS 1 TABLET: TAB at 17:19

## 2022-09-11 RX ADMIN — DIBASIC SODIUM PHOSPHATE, MONOBASIC POTASSIUM PHOSPHATE AND MONOBASIC SODIUM PHOSPHATE 2 TABLET: 852; 155; 130 TABLET ORAL at 17:19

## 2022-09-11 RX ADMIN — Medication 10 ML: at 20:44

## 2022-09-11 RX ADMIN — SERTRALINE HYDROCHLORIDE 100 MG: 50 TABLET ORAL at 10:55

## 2022-09-11 RX ADMIN — Medication 10 ML: at 06:33

## 2022-09-11 RX ADMIN — Medication 10 ML: at 17:20

## 2022-09-11 RX ADMIN — HEPARIN SODIUM 5000 UNITS: 5000 INJECTION INTRAVENOUS; SUBCUTANEOUS at 13:59

## 2022-09-11 RX ADMIN — CIPROFLOXACIN 500 MG: 500 TABLET, FILM COATED ORAL at 10:55

## 2022-09-11 RX ADMIN — PHENAZOPYRIDINE 100 MG: 100 TABLET ORAL at 13:59

## 2022-09-11 RX ADMIN — MICONAZOLE NITRATE 2 % TOPICAL POWDER: at 17:20

## 2022-09-11 RX ADMIN — LISINOPRIL 20 MG: 20 TABLET ORAL at 10:55

## 2022-09-11 RX ADMIN — PANTOPRAZOLE SODIUM 40 MG: 40 TABLET, DELAYED RELEASE ORAL at 10:55

## 2022-09-11 RX ADMIN — PHENAZOPYRIDINE 100 MG: 100 TABLET ORAL at 17:19

## 2022-09-11 RX ADMIN — ASPIRIN 81 MG: 81 TABLET, CHEWABLE ORAL at 10:55

## 2022-09-11 RX ADMIN — FOLIC ACID 1 MG: 1 TABLET ORAL at 17:20

## 2022-09-11 NOTE — PROGRESS NOTES
[address]  [phone]         Hospitalist Progress Note        NAME:  Jacek Andino   :  1945   MRN:  [id number]    Date/Time:  2022     Patient PCP:  Jude King MD    Emergency Contact:    Extended Emergency Contact Information  Primary Emergency Contact: KarelPiotr  Address: Aaliyah Bowers [facility] [address]           [address] [facility], [address] [place] [address] [address] [place] [address] Phone: [phone]  Relation: Spouse  Secondary Emergency Contact: [address] [facility] [address]  Mobile Phone: [phone]  Relation: Son  Preferred language: ENGLISH      Code: DNR     Isolation Precautions: There are currently no Active Isolations        Subjective:     REASON FOR VISIT:  Recheck UTI / Bacteremia     HPI & INTERVAL HISTORY:     Ms. Taylor Lorenzana is a 65 y.o. female with history that includes recurrent cystitits, anxiety/depression, and HTN presented with dysuria x 10 days, chills and N/V. Found to have UTI and met criteria for sepsis based on HR, Temp, WBC, and UTI. Started on ABXs. E. coli growing in blood cultures and urine culture. : Patient was seen and examined. No acute events overnight. Was seen by neurologist.  Underwent MRA (MRI excellently left off but ordered today by neurology)Of the brain MRI of C-spine, T-spine and L-spine. 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 Does not appear to have stroke. PT recommending acute inpatient rehab.    9/10: Patient being seen by physical therapy who is concerned with apparent ongoing and worsening neurological findings. Per  she has had a shuffling gait. Patient continues to report difficulty with walking. Otherwise no acute events overnight and no new complaints. : Patient was seen and examined. She reports that she is feeling very weak is unable to walk. Switched over to Cipro.   EKG in order to start Cipro showed a normal sinus rhythm at 82 bpm with a QT interval of 436    9/08: Patient seen and examined. She looks and feels better but still with some malaise and poor appetite. 9/07: Patient reports feeling better today. Reports feeling less foggy headed and not as weak. Blood cultures appear to be preliminarily E. Coli and urine culture showing GNR. Echo:    Left Ventricle: Normal left ventricular systolic function with a visually estimated EF of 55 - 60%. Left ventricle size is normal. Normal wall thickness. Normal wall motion. Normal diastolic function. Aortic Valve: Mild regurgitation. Mild stenosis of the aortic valve. AV mean gradient is 18 mmHg. Mitral Valve: Mildly thickened leaflet. Moderate annular calcification of the mitral valve. 9/06: Patient was seen and examined. She reports that after starting antibiotic she is feeling better. Has not had any further episodes of fevers or chills. Has been able to keep down her food as she is no longer nauseated and no longer vomiting. Blood cultures growing gram-negative rods.       ALLERGIES  Allergies   Allergen Reactions    Sulfa (Sulfonamide Antibiotics) Rash    Flagyl [Metronidazole] Nausea Only    Macrobid [Nitrofurantoin Monohyd/M-Cryst] Nausea Only         ROS:  Gen:   generalized weakness and inability to ambulate  Resp:  negative  CVS:   negative  GI:       negative  :     negative           Objective:      Visit Vitals  BP (!) 154/79 (BP 1 Location: Right upper arm, BP Patient Position: At rest)   Pulse 76   Temp 98.6 °F (37 °C)   Resp 18   Ht 5' 4\" (1.626 m)   Wt 56.8 kg (125 lb 4.8 oz)   SpO2 95%   BMI 21.51 kg/m²       Physical Exam:  General: No acute distress  Head: Normocephalic, without obvious abnormality, atraumatic  Eyes: anicteric sclerae and conjuntiva clear  ENT: lips, mucosa, and tongue normal  Neck: normal, supple, and no tenderness  Lungs: CTA  Heart: S1, S2 normal, regular rate, and regular rhythm  Abd: not distended, soft, nontender, BS present and normactive  Ext: no cyanosis and no edema  Skin: normal skin color, no rashes, and texture normal  Neuro:  alert, oriented, no defects noted in general exam.  Psych: not anxious, cooperative, appropriate affect      Medications:  Current Facility-Administered Medications   Medication Dose Route Frequency    aspirin chewable tablet 81 mg  81 mg Oral DAILY    atorvastatin (LIPITOR) tablet 80 mg  80 mg Oral QHS    ciprofloxacin HCl (CIPRO) tablet 500 mg  500 mg Oral Q12H    lisinopriL (PRINIVIL, ZESTRIL) tablet 20 mg  20 mg Oral DAILY    loperamide (IMODIUM) capsule 2 mg  2 mg Oral Q4H PRN    heparin (porcine) injection 5,000 Units  5,000 Units SubCUTAneous Q8H    sodium chloride (NS) flush 5-10 mL  5-10 mL IntraVENous PRN    ALPRAZolam (XANAX) tablet 0.5 mg  0.5 mg Oral QHS PRN    amLODIPine (NORVASC) tablet 5 mg  5 mg Oral DAILY    pantoprazole (PROTONIX) tablet 40 mg  40 mg Oral ACB    sertraline (ZOLOFT) tablet 100 mg  100 mg Oral DAILY    lactated Ringers infusion  125 mL/hr IntraVENous CONTINUOUS    sodium chloride (NS) flush 5-40 mL  5-40 mL IntraVENous Q8H    sodium chloride (NS) flush 5-40 mL  5-40 mL IntraVENous PRN    acetaminophen (TYLENOL) tablet 650 mg  650 mg Oral Q6H PRN    Or    acetaminophen (TYLENOL) suppository 650 mg  650 mg Rectal Q6H PRN    polyethylene glycol (MIRALAX) packet 17 g  17 g Oral DAILY PRN    ondansetron (ZOFRAN ODT) tablet 4 mg  4 mg Oral Q8H PRN    Or    ondansetron (ZOFRAN) injection 4 mg  4 mg IntraVENous Q6H PRN        Labs:  No results for input(s): WBC, HGB, HCT, PLT, HGBEXT, HCTEXT, PLTEXT, HGBEXT, HCTEXT, PLTEXT in the last 72 hours. Recent Labs     09/10/22  1640      K 3.1*      CO2 25   *   BUN 16   CREA 0.74   CA 8.4*   MG 2.0         Radiology:  MRA BRAIN WO CONT    Result Date: 9/10/2022  Severe stenosis of the proximal M1 segment on the right. Mild to moderate stenosis of the distal cavernous/supraclinoid ICA on the right and on the left. Shaaron Money      MRA NECK WO CONT    Result Date: 9/10/2022  No flow limiting stenosis or intracranial aneurysm. MRI CERV SPINE W WO CONT    Result Date: 9/10/2022  1. Multilevel degenerative change detailed by level above most significant at C6-7 2. Cord signal and enhancement pattern are normal     MRI [facility] SPINE W WO CONT    Result Date: 9/10/2022  1. Chronic fracture of T12. No acute fracture or significant degenerative change 2. Cord signal and enhancement pattern are normal     MRI LUMB SPINE W WO CONT    Result Date: 9/10/2022  There is no evidence of distal cord signal abnormalities. There is fecal. Early stress fracture/deformity of the pedicle on the right at L5. Multilevel disc and facet degenerative change. Mild canal and mild to moderate left foraminal stenoses at L1/L2 and L2-L3. Mild canal and mild to moderate right foraminal stenosis at L4-5. I personally reviewed and interpreted the imaging studies and agree with official reading    The labs, imaging studies, and medications was reviewed by me on: September 11, 2022         Assessment/Plan:      Acute E. coli sepsis due to UTI with organ dysfunction (ALBINA) and bacteremia POA:  Sepsis has resolved  Continue cipro total of 14 days of abxs because of bacteremia  Echocardiogram unremarkable  ID on consult  Urology notes reviewed. No planned procedures this time follow-up as outpatient. 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 Case management aware. Neurology consult pending consult neurology.      Elevated Troponin:  Unsure of cause possible UTI otherwise asymptomatic  Repeat troponins have been flat with no symptoms Echo unremarkable   No further w/u     ALBINA:  Resolved   Monitor labs as needed    Hypokalemia / Hypophosphatemia:  Replete and monitor    Alcohol abuse:  Folic acid, thiamine, and MVI    HTN:  Continue norvasc and lisinopril    Anxiety/Depression:  Cont w/ Zoloft 100mg daily  Cont w/ Xanax 0.5mg qhs     GERD:  Continue PPI    Body mass index is 21.51 kg/m².:  18.5 - 24.9:  Normal weight      Discussed:  Pt's condition, Imaging findings, Lab findings, Assessment, and Care Plan discussed with: Patient, Spouse via phone, RN, Specialist, Care Manager, and PT/OT    Prophylaxis:  Hep SQ    Probable disposition: IPR      Total time: 30 minutes **I personally saw and examined the patient during this time period**      Date of service:    9/11/2022                ___________________________________________________    Admitting Physician: Yazan Pompa MD

## 2022-09-11 NOTE — CONSULTS
NEUROLOGY IN-PATIENT NEW CONSULTATION      9/11/2022    RE: Kit Levy         1945      REFERRED BY:  Cullen Lemon MD      CHIEF COMPLAINT:  This is Kit Levy is a 65 y.o. female  who had concerns including Vomiting and Diarrhea. HPI:     Patient admitted on 9/5/22 for  feeling \"horrible for several days\" with night sweats, chills, nausea and vomiting, unable to ambulate and found to have chronic cystitis with UTI and sepsis. (+) EColi in blood and urine. During admission, patient noted to have gait issues descrbed as \"being off balanced with weakness of the R leg\" for 1 yr. (-) tremor (-) assistive device    History of T12 fracture. Denies prior strokes or heart attacks    (+) chronic alcohol use 3 ozs of vodka /day    ROS  (-) fever  (-) rash  All other systems reviewed and are negative    Past Medical Hx  No past medical history on file. Social Hx  Social History     Socioeconomic History    Marital status:    Tobacco Use    Smoking status: Never    Smokeless tobacco: Never   Substance and Sexual Activity    Alcohol use: No       Family Hx  No family history on file.     ALLERGIES  Allergies   Allergen Reactions    Sulfa (Sulfonamide Antibiotics) Rash    Flagyl [Metronidazole] Nausea Only    Macrobid [Nitrofurantoin Monohyd/M-Cryst] Nausea Only       CURRENT MEDS  Current Facility-Administered Medications   Medication Dose Route Frequency Provider Last Rate Last Admin    aspirin chewable tablet 81 mg  81 mg Oral DAILY Lorrine Klinefelter, MD   81 mg at 09/11/22 1055    atorvastatin (LIPITOR) tablet 80 mg  80 mg Oral QHS Lorrine Klinefelter, MD   80 mg at 09/10/22 2227    ciprofloxacin HCl (CIPRO) tablet 500 mg  500 mg Oral Q12H Deveron Drought, DO   500 mg at 09/11/22 1055    lisinopriL (PRINIVIL, ZESTRIL) tablet 20 mg  20 mg Oral DAILY Lorrine Klinefelter, MD   20 mg at 09/11/22 1055    loperamide (IMODIUM) capsule 2 mg  2 mg Oral Q4H PRN Liseth STODDARD MD   2 mg at 09/09/22 7670 heparin (porcine) injection 5,000 Units  5,000 Units SubCUTAneous Q8H Jose Grover MD   5,000 Units at 09/11/22 0246    sodium chloride (NS) flush 5-10 mL  5-10 mL IntraVENous PRN Keith Latham DO        ALPRAZolam [facility]) tablet 0.5 mg  0.5 mg Oral QHS PRN Bruce Payne MD   0.5 mg at 09/10/22 0036    amLODIPine (NORVASC) tablet 5 mg  5 mg Oral DAILY Bruce Payne MD   5 mg at 09/11/22 1055    pantoprazole (PROTONIX) tablet 40 mg  40 mg Oral ACB Bruce Payne MD   40 mg at 09/11/22 1055    sertraline (ZOLOFT) tablet 100 mg  100 mg Oral DAILY Bruce Payne MD   100 mg at 09/11/22 1055    lactated Ringers infusion  125 mL/hr IntraVENous CONTINUOUS Bruce Payne  mL/hr at 09/10/22 1419 125 mL/hr at 09/10/22 1419    sodium chloride (NS) flush 5-40 mL  5-40 mL IntraVENous Q8H Bruce Payne MD   10 mL at 09/11/22 5688    sodium chloride (NS) flush 5-40 mL  5-40 mL IntraVENous PRN Bruce Payne MD        acetaminophen (TYLENOL) tablet 650 mg  650 mg Oral Q6H PRN Bruce Payne MD   650 mg at 09/08/22 2047    Or    acetaminophen (TYLENOL) suppository 650 mg  650 mg Rectal Q6H PRN Bruce Payne MD        polyethylene glycol (MIRALAX) packet 17 g  17 g Oral DAILY PRN Bruce Payne MD        ondansetron (ZOFRAN ODT) tablet 4 mg  4 mg Oral Q8H PRN Bruce Payne MD        Or    ondansetron [facility]) injection 4 mg  4 mg IntraVENous Q6H PRN Bruce Payne MD               PREVIOUS WORKUP: (reviewed)  IMAGING:    CT Results (recent):  Results from [place] encounter on 09/05/22    CT ABD PELV WO CONT    Narrative  EXAM: CT ABD PELV WO CONT    INDICATION: lower abd pain    COMPARISON: None    IV CONTRAST: None. ORAL CONTRAST: None. TECHNIQUE:  Thin axial images were obtained through the abdomen and pelvis. Coronal and  sagittal reformats were generated. CT dose reduction was achieved through use of  a standardized protocol tailored for this examination and automatic exposure  control for dose modulation.     The absence of intravenous and oral contrast material reduces the capacity of CT  to evaluate the bowel, vasculature and solid organs. FINDINGS:  LOWER THORAX: Multifocal areas of pulmonary groundglass opacification with  consolidation in the medial segment of the right middle lobe also noted. Clinical correlation is recommended. The findings are nonspecific and can be  seen in various conditions including inflammatory or infectious etiologies. COVID-19 pneumonia could have this appearance. LIVER: Hepatomegaly with hepatic length of 21 cm. No hepatic mass or  intrahepatic bile duct dilation demonstrated. BILIARY TREE: Several tiny dependent gallstones. Mild gallbladder distention. CBD is not dilated. SPLEEN: Splenomegaly with length of 13.4 cm. PANCREAS: No focal abnormality. ADRENALS: Unremarkable. KIDNEYS/URETERS: 3.9 cm exophytic lesion of the inferior pole of left kidney  consistent with cyst.  2. Right inferior pole nonobstructing calculi measuring 14 mm and 9 mm. Right  perinephric stranding with mild right renal pelvocaliectasis to the level of the  ureteropelvic junction where a transition to normal caliber ureter is shown. There is a calcification in the lower pelvis measuring 4 mm in size, located  approximately 2 cm proximal to the uterovesical junction. This might represent a  retained renal calculus. STOMACH: Unremarkable. SMALL BOWEL: No dilatation or wall thickening. COLON: Diffuse colonic diverticulosis no demonstration of diverticulitis. A  rectosigmoid anastomosis is shown. APPENDIX: Normal.  PERITONEUM: No ascites or pneumoperitoneum. RETROPERITONEUM: Moderately abundant atherosclerotic calcifications. No  aneurysm. No intracranial mass or adenopathy. REPRODUCTIVE ORGANS: Unremarkable. URINARY BLADDER: No mass or calculus. BONES: Moderate diffuse osteopenia.  Chronic appearing moderate to severe  vertebral compression fracture at T12 including retropositioning of the  posterior cortex of the vertebral body measuring up to 6 mm compared with  adjacent levels. Moderate to severe L1-2 through L4-5 spondylosis is shown. ABDOMINAL WALL: No mass or hernia. ADDITIONAL COMMENTS: N/A    Impression  1. Multifocal areas of pulmonary groundglass opacification and patchy  consolidation in medial segment of right middle lobe for which clinical  correlation is recommended as above. 2. Hepatosplenomegaly. 3. Several tiny dependent gallstones. Mild gallbladder distention. 3. 2 nonobstructing calculi in the inferior pole the right kidney measuring 14  mm and 9 mm. 4. Right perinephric fat stranding and mild right renal pelvocaliectasis with  abrupt transition at UPJ. The ureters distal to this point is of normal caliber. There is a 4 mm calcification in the pelvis approximately 2 cm proximal to the  UVJ which might represent a retained renal calculus. 5. Diffuse colonic diverticulosis. No diverticulitis demonstrated. Rectosigmoid  anastomosis is noted. 6. Chronic appearing moderate to severe T12 vertebral compression fracture. MRI Results (recent):  Results from [place] encounter on 09/05/22    MRI LUMB SPINE W WO CONT    Narrative  EXAM: MRI LUMB SPINE W WO CONT  Clinical history: Abnormal gait  INDICATION: Abnl gait adn weakness. COMPARISON: None    TECHNIQUE: MR imaging of the lumbar spine was performed using the following  sequences: sagittal T1, T2, STIR;  axial T1, T2 prior to and following contrast  administration. CONTRAST: ProHance    FINDINGS:    There is a moderate to severe chronic vertebral compression fracture at T12. There is a cleft in the central portion of the vertebral bodies. Abdominal aorta  is normal in caliber. Left renal cyst requires no further imaging follow-up. Pelviectasis on the right. Discogenic marrow degenerative changes. No  significant cortical retropulsion. No epidural hematoma. There is no sacral  fracture.  There is no acute fracture. The conus medullaris terminates at L2. Signal and caliber of the distal spinal  cord are within normal limits. There is no pathologic intrathecal enhancement. The paraspinal soft tissues are within normal limits. Lower thoracic spine: T12/L1 left paracentral bulge/osteophyte. Mild facet  arthropathy. Canal is patent. Mild left foraminal stenosis. L1-L2: Mild facet arthropathy. Disc desiccation. Loss of disc height. Circumferential disc protrusion. Mild canal stenosis. Mild to moderate left  foraminal stenosis. L2-L3: Mild facet arthropathy. Circumferential bulge/osteophyte. Mild canal and  mild to moderate left foraminal stenosis. L3-L4: Loss of disc height. Mild facet arthropathy. Circumferential  bulge/osteophyte. Canal is patent. Mild to moderate left foraminal stenosis. L4-L5: Loss of disc height. Mild facet arthropathy. Mild canal stenosis. Mild to  moderate right foraminal stenosis. L5-S1: Mild facet arthropathy. Minimal fluid in the facet joint on the right. Disc bulge. Minimal canal stenosis. No significant foraminal stenosis. Marrow  signal abnormality in the pedicle on the right at L5-S1 is consistent with early  stress fracture. Splenomegaly. Impression  There is no evidence of distal cord signal abnormalities. There is fecal.  Early stress fracture/deformity of the pedicle on the right at L5. Multilevel disc and facet degenerative change. Mild canal and mild to moderate left foraminal stenoses at L1/L2 and L2-L3. Mild canal and mild to moderate right foraminal stenosis at L4-5. IR Results (recent):  No results found for this or any previous visit. VAS/US Results (recent):  No results found for this or any previous visit.           LABS (reviewed)  Results for orders placed or performed during the hospital encounter of 09/05/22   URINE CULTURE HOLD SAMPLE    Specimen: Serum; Urine   Result Value Ref Range    Urine culture hold        Urine on hold in Microbiology dept for 2 days. If unpreserved urine is submitted, it cannot be used for addtional testing after 24 hours, recollection will be required. CULTURE, BLOOD    Specimen: Blood   Result Value Ref Range    Special Requests: NO SPECIAL REQUESTS      Culture result: (A)       ESCHERICHIA COLI GROWING IN BOTH BOTTLES DRAWN (SITE = L ARM) REFER TO [id number] FOR SENSITIVITIES   CULTURE, BLOOD    Specimen: Blood   Result Value Ref Range    Special Requests: NO SPECIAL REQUESTS      Culture result: (A)       ESCHERICHIA COLI GROWING IN BOTH BOTTLES DRAWN (SITE = RAC)    Culture result:       (NOTE) GNR CALLED TO JOSHUA ADLER, AT 1120 ON 9/6/22. RF       Susceptibility    Escherichia coli - ALISA     Amikacin ($)  Susceptible ug/mL     Ampicillin ($)  Resistant ug/mL     Ampicillin/sulbactam ($)  Resistant ug/mL     Cefazolin ($)  Susceptible ug/mL     Ceftazidime ($)  Susceptible ug/mL     Ceftriaxone ($)  Susceptible ug/mL     Cefepime ($$)  Susceptible ug/mL     Ciprofloxacin ($)  Susceptible ug/mL     Gentamicin ($)  Susceptible ug/mL     Levofloxacin ($)  Susceptible ug/mL     Meropenem ($$)  Susceptible ug/mL     Piperacillin/Tazobac ($)  Susceptible ug/mL     Tobramycin ($)  Susceptible ug/mL     Trimeth/Sulfa  Susceptible ug/mL     Cefoxitin  Susceptible ug/mL   COVID-19 RAPID TEST   Result Value Ref Range    Specimen source Nasopharyngeal      COVID-19 rapid test Not detected NOTD     BLOOD CULTURE ID PANEL    Specimen: Blood   Result Value Ref Range    Acc. no. from Micro Order [id number]     Enterococcus faecalis Not detected NOTD      Enterococcus faecium Not detected NOTD      Listeria monocytogenes Not detected NOTD      Staphylococcus Not detected NOTD      Staphylococcus aureus Not detected NOTD      Staph epidermidis Not detected NOTD      Staph lugdunensis Not detected NOTD      Streptococcus Not detected NOTD      Streptococcus agalactiae (Group B) Not detected NOTD      Streptococcus pneumoniae Not detected NOTD      Streptococcus pyogenes (Group A) Not detected NOTD      Acinetobacter calcoaceticus-baumanii complex Not detected NOTD      Bacteroides fragilis Not detected NOTD      Enterobacterales species Detected (A) NOTD      Enterobacter cloacae complex Not detected NOTD      Escherichia coli Detected (A) NOTD      Klebsiella aerogenes Not detected NOTD      Klebsiella oxytoca Not detected NOTD      Klebsiella pneumoniae group Not detected NOTD      Proteus Not detected NOTD      Salmonella Not detected NOTD      Serratia marcescens Not detected NOTD      Haemophilus influenzae Not detected NOTD      Neisseria meningitidis Not detected NOTD      Pseudomonas aeruginosa Not detected NOTD      Steno maltophilia Not detected NOTD      Candida albicans Not detected NOTD      Candida auris Not detected NOTD      Candida glabrata Not detected NOTD      Tiffani krusei Not detected NOTD      Candida parapsilosis Not detected NOTD      Candida tropicalis Not detected NOTD      Crypto neoformans/gattii Not detected NOTD      RESISTANT GENES:          CTX-M (ESBL resistant gene) Not detected NOTD      IMP (Carbapenemase resistant gene) Not detected NOTD      KPC (Carbapenem Resistance Gene) Not detected NOTD      mcr-1 (Colistin resistant gene) Not detected NOTD      NDM (Carbapenemase resistant gene) Not detected NOTD      OXA-48-like (Carbapenemase resistant gene) Not detected NOTD      VIM (Carbapenemase resistant gene) Not detected NOTD      Comment        False positive results may rarely occur.  Correlate with clinical,epidemiologic, and other laboratory findings   CULTURE, URINE    Specimen: Clean catch; Urine   Result Value Ref Range    Special Requests: NO SPECIAL REQUESTS      [place] Count [id number]  COLONIES/mL        Culture result: ESCHERICHIA COLI (A)         Susceptibility    Escherichia coli - ALISA     Amikacin ($)  Susceptible ug/mL     Ampicillin ($)  Resistant ug/mL     Ampicillin/sulbactam ($)  Intermediate ug/mL     Cefazolin ($)  Susceptible ug/mL     Cefepime ($$)  Susceptible ug/mL     Cefoxitin  Susceptible ug/mL     Ceftazidime ($)  Susceptible ug/mL     Ceftriaxone ($)  Susceptible ug/mL     Ciprofloxacin ($)  Susceptible ug/mL     Gentamicin ($)  Susceptible ug/mL     Levofloxacin ($)  Susceptible ug/mL     Meropenem ($$)  Susceptible ug/mL     Nitrofurantoin  Susceptible ug/mL     Piperacillin/Tazobac ($)  Susceptible ug/mL     Tobramycin ($)  Susceptible ug/mL     Trimeth/Sulfa  Susceptible ug/mL   CULTURE, BLOOD, PAIRED    Specimen: Blood   Result Value Ref Range    Special Requests: NO SPECIAL REQUESTS      Culture result: NO GROWTH 4 DAYS     CBC WITH AUTOMATED DIFF   Result Value Ref Range    WBC 18.3 (H) 3.6 - 11.0 K/uL    RBC 4.37 3.80 - 5.20 M/uL    HGB 13.9 11.5 - 16.0 g/dL    HCT 39.2 35.0 - 47.0 %    MCV 89.7 80.0 - 99.0 FL    MCH 31.8 26.0 - 34.0 PG    MCHC 35.5 30.0 - 36.5 g/dL    RDW 13.5 11.5 - 14.5 %    PLATELET 82 (L) 045 - 400 K/uL    MPV 10.8 8.9 - 12.9 FL    NRBC 0.0 0  WBC    ABSOLUTE NRBC 0.00 0.00 - 0.01 K/uL    NEUTROPHILS 91 (H) 32 - 75 %    LYMPHOCYTES 3 (L) 12 - 49 %    MONOCYTES 5 5 - 13 %    EOSINOPHILS 0 0 - 7 %    BASOPHILS 0 0 - 1 %    IMMATURE GRANULOCYTES 1 (H) 0.0 - 0.5 %    ABS. NEUTROPHILS 16.7 (H) 1.8 - 8.0 K/UL    ABS. LYMPHOCYTES 0.5 (L) 0.8 - 3.5 K/UL    ABS. MONOCYTES 0.9 0.0 - 1.0 K/UL    ABS. EOSINOPHILS 0.0 0.0 - 0.4 K/UL    ABS. BASOPHILS 0.0 0.0 - 0.1 K/UL    ABS. IMM.  GRANS. 0.2 (H) 0.00 - 0.04 K/UL    DF AUTOMATED      RBC COMMENTS NORMOCYTIC, NORMOCHROMIC      WBC COMMENTS VACUOLATED POLYS     METABOLIC PANEL, COMPREHENSIVE   Result Value Ref Range    Sodium 135 (L) 136 - 145 mmol/L    Potassium 3.4 (L) 3.5 - 5.1 mmol/L    Chloride 104 97 - 108 mmol/L    CO2 20 (L) 21 - 32 mmol/L    Anion gap 11 5 - 15 mmol/L    Glucose 187 (H) 65 - 100 mg/dL    BUN 44 (H) 6 - 20 MG/DL    Creatinine 1.82 (H) 0.55 - 1.02 MG/DL    BUN/Creatinine ratio 24 (H) 12 - 20      GFR est AA 33 (L) >60 ml/min/1.73m2    GFR est non-AA 27 (L) >60 ml/min/1.73m2    Calcium 8.7 8.5 - 10.1 MG/DL    Bilirubin, total 1.5 (H) 0.2 - 1.0 MG/DL    ALT (SGPT) 22 12 - 78 U/L    AST (SGOT) 20 15 - 37 U/L    Alk. phosphatase 79 45 - 117 U/L    Protein, total 8.3 (H) 6.4 - 8.2 g/dL    Albumin 3.7 3.5 - 5.0 g/dL    Globulin 4.6 (H) 2.0 - 4.0 g/dL    A-G Ratio 0.8 (L) 1.1 - 2.2     LIPASE   Result Value Ref Range    Lipase 44 (L) 73 - 393 U/L   URINALYSIS W/MICROSCOPIC   Result Value Ref Range    Color YELLOW/STRAW      Appearance TURBID (A) CLEAR      Specific gravity 1.020 1.003 - 1.030      pH (UA) 5.0 5.0 - 8.0      Protein 300 (A) NEG mg/dL    Glucose Negative NEG mg/dL    Ketone TRACE (A) NEG mg/dL    Bilirubin Negative NEG      Blood LARGE (A) NEG      Urobilinogen 1.0 0.2 - 1.0 EU/dL    Nitrites Positive (A) NEG      Leukocyte Esterase LARGE (A) NEG      WBC >100 (H) 0 - 4 /hpf    RBC 5-10 0 - 5 /hpf    Epithelial cells FEW FEW /lpf    Bacteria 4+ (A) NEG /hpf    Hyaline cast 10-20 0 - 5 /lpf   SAMPLES BEING HELD   Result Value Ref Range    SAMPLES BEING HELD  1DK GRN, 1SST, 1RED, 1BLU     COMMENT        Add-on orders for these samples will be processed based on acceptable specimen integrity and analyte stability, which may vary by analyte.    TROPONIN-HIGH SENSITIVITY   Result Value Ref Range    Troponin-High Sensitivity 334 (HH) 0 - 51 ng/L   MAGNESIUM   Result Value Ref Range    Magnesium 1.7 1.6 - 2.4 mg/dL   PROCALCITONIN   Result Value Ref Range    Procalcitonin 155.58 ng/mL   INFLUENZA A+B VIRAL AGS   Result Value Ref Range    Influenza A Antigen Negative NEG      Influenza B Antigen Negative NEG     CBC W/O DIFF   Result Value Ref Range    WBC 16.2 (H) 3.6 - 11.0 K/uL    RBC 3.81 3.80 - 5.20 M/uL    HGB 12.4 11.5 - 16.0 g/dL    HCT 35.2 35.0 - 47.0 %    MCV 92.4 80.0 - 99.0 FL    MCH 32.5 26.0 - 34.0 PG    MCHC 35.2 30.0 - 36.5 g/dL    RDW 13.5 11.5 - 14.5 %    PLATELET 65 (L) 150 - 400 K/uL    MPV 11.2 8.9 - 12.9 FL    NRBC 0.0 0  WBC    ABSOLUTE NRBC 0.00 0.00 - 7.43 K/uL   METABOLIC PANEL, BASIC   Result Value Ref Range    Sodium 138 136 - 145 mmol/L    Potassium 3.0 (L) 3.5 - 5.1 mmol/L    Chloride 107 97 - 108 mmol/L    CO2 22 21 - 32 mmol/L    Anion gap 9 5 - 15 mmol/L    Glucose 149 (H) 65 - 100 mg/dL    BUN 43 (H) 6 - 20 MG/DL    Creatinine 1.59 (H) 0.55 - 1.02 MG/DL    BUN/Creatinine ratio 27 (H) 12 - 20      GFR est AA 38 (L) >60 ml/min/1.73m2    GFR est non-AA 31 (L) >60 ml/min/1.73m2    Calcium 8.1 (L) 8.5 - 10.1 MG/DL   TROPONIN-HIGH SENSITIVITY   Result Value Ref Range    Troponin-High Sensitivity 362 (HH) 0 - 51 ng/L   TROPONIN-HIGH SENSITIVITY   Result Value Ref Range    Troponin-High Sensitivity 317 (HH) 0 - 51 ng/L   ERYTHROPOIETIN   Result Value Ref Range    Erythropoietin 19.3 (H) 2.6 - 18.5 mIU/mL   VITAMIN D, 1, 25 DIHYDROXY   Result Value Ref Range    Calcitriol (Vit D 1, 25 di-OH) 56.0 24.8 - 81.5 pg/mL   VITAMIN D, 25 HYDROXY   Result Value Ref Range    Vitamin D 25-Hydroxy 36.7 30 - 100 ng/mL   PTH INTACT   Result Value Ref Range    Calcium 8.4 (L) 8.5 - 10.1 MG/DL    PTH, Intact 54.2 18.4 - 88.0 pg/mL   HEMOGLOBIN A1C WITH EAG   Result Value Ref Range    Hemoglobin A1c 5.2 4.0 - 5.6 %    Est. average glucose 103 mg/dL   PHOSPHORUS   Result Value Ref Range    Phosphorus 2.2 (L) 2.6 - 4.7 MG/DL   MAGNESIUM   Result Value Ref Range    Magnesium 2.1 1.6 - 2.4 mg/dL   SODIUM, UR, RANDOM   Result Value Ref Range    Sodium,urine random 42 MMOL/L   CREATININE, UR, RANDOM   Result Value Ref Range    Creatinine, urine 73.09 mg/dL   METABOLIC PANEL, BASIC   Result Value Ref Range    Sodium 137 136 - 145 mmol/L    Potassium 3.2 (L) 3.5 - 5.1 mmol/L    Chloride 108 97 - 108 mmol/L    CO2 22 21 - 32 mmol/L    Anion gap 7 5 - 15 mmol/L    Glucose 110 (H) 65 - 100 mg/dL    BUN 23 (H) 6 - 20 MG/DL    Creatinine 0.92 0.55 - 1.02 MG/DL    BUN/Creatinine ratio 25 (H) 12 - 20 GFR est AA >60 >60 ml/min/1.73m2    GFR est non-AA 59 (L) >60 ml/min/1.73m2    Calcium 8.5 8.5 - 10.1 MG/DL   CBC WITH AUTOMATED DIFF   Result Value Ref Range    WBC 9.7 3.6 - 11.0 K/uL    RBC 3.91 3.80 - 5.20 M/uL    HGB 12.4 11.5 - 16.0 g/dL    HCT 35.6 35.0 - 47.0 %    MCV 91.0 80.0 - 99.0 FL    MCH 31.7 26.0 - 34.0 PG    MCHC 34.8 30.0 - 36.5 g/dL    RDW 13.4 11.5 - 14.5 %    PLATELET 64 (L) 723 - 400 K/uL    MPV 11.0 8.9 - 12.9 FL    NRBC 0.0 0  WBC    ABSOLUTE NRBC 0.00 0.00 - 0.01 K/uL    NEUTROPHILS 90 (H) 32 - 75 %    BAND NEUTROPHILS 3 0 - 6 %    LYMPHOCYTES 3 (L) 12 - 49 %    MONOCYTES 4 (L) 5 - 13 %    EOSINOPHILS 0 0 - 7 %    BASOPHILS 0 0 - 1 %    IMMATURE GRANULOCYTES 0 %    ABS. NEUTROPHILS 9.0 (H) 1.8 - 8.0 K/UL    ABS. LYMPHOCYTES 0.3 (L) 0.8 - 3.5 K/UL    ABS. MONOCYTES 0.4 0.0 - 1.0 K/UL    ABS. EOSINOPHILS 0.0 0.0 - 0.4 K/UL    ABS. BASOPHILS 0.0 0.0 - 0.1 K/UL    ABS. IMM.  GRANS. 0.0 K/UL    DF MANUAL      RBC COMMENTS NORMOCYTIC, NORMOCHROMIC     METABOLIC PANEL, BASIC   Result Value Ref Range    Sodium 135 (L) 136 - 145 mmol/L    Potassium 3.8 3.5 - 5.1 mmol/L    Chloride 105 97 - 108 mmol/L    CO2 23 21 - 32 mmol/L    Anion gap 7 5 - 15 mmol/L    Glucose 103 (H) 65 - 100 mg/dL    BUN 17 6 - 20 MG/DL    Creatinine 0.79 0.55 - 1.02 MG/DL    BUN/Creatinine ratio 22 (H) 12 - 20      GFR est AA >60 >60 ml/min/1.73m2    GFR est non-AA >60 >60 ml/min/1.73m2    Calcium 8.7 8.5 - 10.1 MG/DL   CBC WITH AUTOMATED DIFF   Result Value Ref Range    WBC 9.5 3.6 - 11.0 K/uL    RBC 3.74 (L) 3.80 - 5.20 M/uL    HGB 11.9 11.5 - 16.0 g/dL    HCT 33.5 (L) 35.0 - 47.0 %    MCV 89.6 80.0 - 99.0 FL    MCH 31.8 26.0 - 34.0 PG    MCHC 35.5 30.0 - 36.5 g/dL    RDW 13.2 11.5 - 14.5 %    PLATELET 77 (L) 130 - 400 K/uL    MPV 10.9 8.9 - 12.9 FL    NRBC 0.0 0  WBC    ABSOLUTE NRBC 0.00 0.00 - 0.01 K/uL    NEUTROPHILS 84 (H) 32 - 75 %    LYMPHOCYTES 6 (L) 12 - 49 %    MONOCYTES 8 5 - 13 %    EOSINOPHILS 1 0 - 7 %    BASOPHILS 0 0 - 1 %    IMMATURE GRANULOCYTES 1 (H) 0.0 - 0.5 %    ABS. NEUTROPHILS 7.9 1.8 - 8.0 K/UL    ABS. LYMPHOCYTES 0.6 (L) 0.8 - 3.5 K/UL    ABS. MONOCYTES 0.8 0.0 - 1.0 K/UL    ABS. EOSINOPHILS 0.1 0.0 - 0.4 K/UL    ABS. BASOPHILS 0.0 0.0 - 0.1 K/UL    ABS. IMM.  GRANS. 0.1 (H) 0.00 - 0.04 K/UL    DF AUTOMATED     LIPID PANEL   Result Value Ref Range    Cholesterol, total 128 <200 MG/DL    Triglyceride 249 (H) <150 MG/DL    HDL Cholesterol 15 MG/DL    LDL, calculated 63.2 0 - 100 MG/DL    VLDL, calculated 49.8 MG/DL    CHOL/HDL Ratio 8.5 (H) 0.0 - 5.0     TSH 3RD GENERATION   Result Value Ref Range    TSH 1.02 0.36 - 0.84 uIU/mL   METABOLIC PANEL, BASIC   Result Value Ref Range    Sodium 137 136 - 145 mmol/L    Potassium 3.1 (L) 3.5 - 5.1 mmol/L    Chloride 104 97 - 108 mmol/L    CO2 25 21 - 32 mmol/L    Anion gap 8 5 - 15 mmol/L    Glucose 134 (H) 65 - 100 mg/dL    BUN 16 6 - 20 MG/DL    Creatinine 0.74 0.55 - 1.02 MG/DL    BUN/Creatinine ratio 22 (H) 12 - 20      GFR est AA >60 >60 ml/min/1.73m2    GFR est non-AA >60 >60 ml/min/1.73m2    Calcium 8.4 (L) 8.5 - 10.1 MG/DL   MAGNESIUM   Result Value Ref Range    Magnesium 2.0 1.6 - 2.4 mg/dL   HOMOCYSTEINE, PLASMA   Result Value Ref Range    Homocysteine, plasma 11.0 3.7 - 13.9 umol/L   POC LACTIC ACID   Result Value Ref Range    Lactic Acid (POC) 1.77 0.40 - 2.00 mmol/L   EKG, 12 LEAD, INITIAL   Result Value Ref Range    Ventricular Rate 101 BPM    Atrial Rate 101 BPM    P-R Interval 126 ms    QRS Duration 74 ms    Q-T Interval 334 ms    QTC Calculation (Bezet) 433 ms    Calculated P Axis 11 degrees    Calculated R Axis -29 degrees    Calculated T Axis 39 degrees    Diagnosis       Sinus tachycardia  Minimal voltage criteria for LVH, may be normal variant ( R in aVL )  Borderline ECG  When compared with ECG of 13-APR-2016 11:15,  No significant change was found  Confirmed by Nancy Romo M.D., Nonda Goodell ([id number]) on 9/6/2022 2:45:40 PM     EKG, 12 LEAD, INITIAL   Result Value Ref Range    Ventricular Rate 79 BPM    Atrial Rate 79 BPM    P-R Interval 142 ms    QRS Duration 70 ms    Q-T Interval 396 ms    QTC Calculation (Bezet) 454 ms    Calculated P Axis 5 degrees    Calculated R Axis -24 degrees    Calculated T Axis 13 degrees    Diagnosis       Normal sinus rhythm  Minimal voltage criteria for LVH, may be normal variant  Borderline ECG  When compared with ECG of 05-SEP-2022 18:22,  No significant change was found  Confirmed by Wayne Da Silva M.D., John Figueroa ([id number]) on 9/9/2022 3:33:27 PM     EKG, 12 LEAD, INITIAL   Result Value Ref Range    Ventricular Rate 82 BPM    Atrial Rate 82 BPM    P-R Interval 142 ms    QRS Duration 74 ms    Q-T Interval 374 ms    QTC Calculation (Bezet) 436 ms    Calculated P Axis 2 degrees    Calculated R Axis -32 degrees    Calculated T Axis 24 degrees    Diagnosis       Normal sinus rhythm  Left axis deviation  Minimal voltage criteria for LVH, may be normal variant  Cannot rule out Anterior infarct , age undetermined  Abnormal ECG    Confirmed by Wayne D aSilva M.D., John Figueroa ([id number]) on 9/9/2022 3:42:48 PM     ECHO ADULT COMPLETE   Result Value Ref Range    IVSd 1.0 (A) 0.6 - 0.9 cm    LVIDd 4.1 3.9 - 5.3 cm    LVIDs 2.9 cm    LVOT Diameter 2.0 cm    LVPWd 1.0 (A) 0.6 - 0.9 cm    LVOT Peak Gradient 5 mmHg    LVOT Mean Gradient 3 mmHg    LVOT SV 79.8 ml    LVOT Peak Velocity 1.2 m/s    LVOT VTI 25.4 cm    RVIDd 3.4 cm    RVOT Peak Gradient 2 mmHg    RVOT Peak Velocity 0.8 m/s    LA Diameter 3.7 cm    LA Volume A/L 47 mL    LA Volume 2C 39 22 - 52 mL    LA Volume 4C 44 22 - 52 mL    LA Volume BP 43 22 - 52 mL    AV Area by Peak Velocity 1.3 cm2    AV Area by VTI 1.5 cm2    AR .4 millisecond    AR Max Velocity PISA 4.6 m/s    AV Peak Gradient 32 mmHg    AV Mean Gradient 18 mmHg    AV Peak Velocity 2.8 m/s    AV Mean Velocity 2.0 m/s    AV VTI 55.3 cm    MV A Velocity 1.55 m/s    MV E Wave Deceleration Time 247.4 ms    MV E Velocity 1.19 m/s    LV E' Lateral Velocity 7 cm/s    LV E' Septal Velocity 9 cm/s    MV Area by VTI 1.9 cm2    MR Peak Gradient 117 mmHg    MR Peak Velocity 5.4 m/s    MV Peak Gradient 13 mmHg    MV Mean Gradient 6 mmHg    MV Max Velocity 1.8 m/s    MV Mean Velocity 1.2 m/s    MV VTI 41.9 cm    Pulmonary Artery EDP 7 mmHg    KY Max Velocity 1.3 m/s    PV Peak Gradient 4 mmHg    PV Max Velocity 1.0 m/s    TAPSE 2.1 1.7 cm    TR Peak Gradient 36 mmHg    TR Max Velocity 3.00 m/s    Ascending Aorta 3.4 cm    Fractional Shortening 2D 29 28 - 44 %    LVIDd Index 2.52 cm/m2    LVIDs Index 1.78 cm/m2    LV RWT Ratio 0.49     LV Mass 2D 132.1 67 - 162 g    LV Mass 2D Index 81.0 43 - 95 g/m2    MV E/A 0.77     E/E' Ratio (Averaged) 15.11     E/E' Lateral 17.00     E/E' Septal 13.22     LA Volume Index BP 26 16 - 34 ml/m2    LA Volume Index A/L 29 16 - 34 mL/m2    LVOT Stroke Volume Index 48.9 mL/m2    LVOT Area 3.1 cm2    LA Volume Index 2C 24 16 - 34 mL/m2    LA Volume Index 4C 27 16 - 34 mL/m2    LA Size Index 2.27 cm/m2    Ascending Aorta Index 2.09 cm/m2    AV Velocity Ratio 0.43     LVOT:AV VTI Index 0.46     CHAD/BSA VTI 0.9 cm2/m2    CHAD/BSA Peak Velocity 0.8 cm2/m2    MV:LVOT VTI Index 1.65        Physical Exam:   Visit Vitals  BP (!) 147/67 (BP 1 Location: Right upper arm, BP Patient Position: At rest)   Pulse 74   Temp 98.4 °F (36.9 °C)   Resp 18   Ht 5' 4\" (1.626 m)   Wt 56.8 kg (125 lb 4.8 oz)   SpO2 95%   BMI 21.51 kg/m²     General:  Alert, cooperative, no distress. Head:  Normocephalic, without obvious abnormality, atraumatic. Eyes:  Conjunctivae/corneas clear. Lungs:  Heart:   Non labored breathing  Regular rate and rhythm, no carotid bruits   Abdomen:   Soft, non-distended   Extremities: Extremities normal, atraumatic, no cyanosis or edema. Pulses: 2+ and symmetric all extremities. Skin: Skin color, texture, turgor normal. No rashes or lesions.   Neurologic Exam     Gen: Attention normal             Language: naming, repetition, fluency normal             Memory: intact recent and remote memory  Cranial Nerves:  I: smell Not tested   II: visual fields Full to confrontation   II: pupils Equal, round, reactive to light   II: optic disc No papilledema   III,VII: ptosis none   III,IV,VI: extraocular muscles  Full ROM   V: mastication normal   V: facial light touch sensation  normal   VII: facial muscle function   symmetric   VIII: hearing symmetric   IX: soft palate elevation  normal   XI: trapezius strength  5/5   XI: sternocleidomastoid strength 5/5   XI: neck flexion strength  5/5   XII: tongue  midline     Motor: normal bulk and tone, no tremor              Strength: 5/5 all four extremities  (-) tremors  Some rigidity  (-) bradykinesia  Sensory: intact to LT, PP, vibration, and temperature  Reflexes: 1+ throughout; Down going toes  Coordination: Good FTN and HTS  Gait: refused to be tested           Impression:     Ulises Toledo is a 65 y.o. female who T12 fracutre who was admitted on 9/5/22 for  feeling \"horrible for several days\" with night sweats, chills, nausea and vomiting, unable to ambulate and found to have chronic cystitis with UTI and sepsis. (+) EColi in blood and urine. During admission, patient noted to have gait issues descrbed as \"being off balanced with weakness of the R leg\" for 1 yr. (-) tremor (-) assistive device    Considerations include gait disturbance, multifactorial (I.e. R hip issue,  chronic alcohol use 3 ozs of vodka /day leading to ataxia). No tremors on exam which makes typical Parkinson's disease less likely. Evaluate for stroke    MRA brain severe stenosis R MCA  MRA neck mild to moderate stenosis of the distal cavernous/supraclinoid ICA on the right  and on the left  MRI cervical spine: C6-C7: Small disc osteophytic bulge with bilateral uncovertebral degenerative  change.  Canal stenosis is mild to moderate with mild to moderate narrowing of  the foramen  MRI thoracic spine: Chronic fracture of T12. No acute fracture or significant degenerative change; Cord signal and enhancement pattern are normal  MRI lumbar spine: Early stress fracture/deformity of the pedicle on the right at L5.    RECOMMENDATIONS  1. I had a long discussion with patient and friend. Discussed diagnosis, prognosis, pathophysiology and available treatment. Reviewed test results. All questions were answered. 2. Will do MRI brain to look for stroke  3. Recommend medical management of MCA stenosis. Placed on ASA 81  4. LDL 63.2 but . Patient refused being placed on cholesterol medication  5. Physical therapy  6. Treat UTI  7. Advise to quit alcohol  8.  Start Thiamine 100 mg every day     Please call for questions        Thank you for the consultation      Sung Mejia MD  Diplomate, American Board of Psychiatry and Neurology  Diplomate, Neuromuscular Medicine  Diplomate, American Board of Electrodiagnostic Medicine    Greater than 50% of time spent counseling patient        CC: Maurice Mosqueda MD  Fax: [phone]

## 2022-09-12 LAB
ANION GAP SERPL CALC-SCNC: 5 MMOL/L (ref 5–15)
BACTERIA SPEC CULT: NORMAL
BUN SERPL-MCNC: 12 MG/DL (ref 6–20)
BUN/CREAT SERPL: 17 (ref 12–20)
CALCIUM SERPL-MCNC: 8.3 MG/DL (ref 8.5–10.1)
CHLORIDE SERPL-SCNC: 108 MMOL/L (ref 97–108)
CO2 SERPL-SCNC: 26 MMOL/L (ref 21–32)
CREAT SERPL-MCNC: 0.7 MG/DL (ref 0.55–1.02)
ERYTHROCYTE [DISTWIDTH] IN BLOOD BY AUTOMATED COUNT: 13.1 % (ref 11.5–14.5)
GLUCOSE SERPL-MCNC: 97 MG/DL (ref 65–100)
HCT VFR BLD AUTO: 31.7 % (ref 35–47)
HGB BLD-MCNC: 10.8 G/DL (ref 11.5–16)
MAGNESIUM SERPL-MCNC: 2.3 MG/DL (ref 1.6–2.4)
MCH RBC QN AUTO: 31.7 PG (ref 26–34)
MCHC RBC AUTO-ENTMCNC: 34.1 G/DL (ref 30–36.5)
MCV RBC AUTO: 93 FL (ref 80–99)
NRBC # BLD: 0 K/UL (ref 0–0.01)
NRBC BLD-RTO: 0 PER 100 WBC
PHOSPHATE SERPL-MCNC: 3.5 MG/DL (ref 2.6–4.7)
PLATELET # BLD AUTO: 164 K/UL (ref 150–400)
PMV BLD AUTO: 10.5 FL (ref 8.9–12.9)
POTASSIUM SERPL-SCNC: 3.8 MMOL/L (ref 3.5–5.1)
RBC # BLD AUTO: 3.41 M/UL (ref 3.8–5.2)
SERVICE CMNT-IMP: NORMAL
SODIUM SERPL-SCNC: 139 MMOL/L (ref 136–145)
WBC # BLD AUTO: 7.6 K/UL (ref 3.6–11)

## 2022-09-12 PROCEDURE — 97530 THERAPEUTIC ACTIVITIES: CPT

## 2022-09-12 PROCEDURE — 97112 NEUROMUSCULAR REEDUCATION: CPT

## 2022-09-12 PROCEDURE — 74011250637 HC RX REV CODE- 250/637: Performed by: INTERNAL MEDICINE

## 2022-09-12 PROCEDURE — 92526 ORAL FUNCTION THERAPY: CPT

## 2022-09-12 PROCEDURE — 85027 COMPLETE CBC AUTOMATED: CPT

## 2022-09-12 PROCEDURE — 99233 SBSQ HOSP IP/OBS HIGH 50: CPT | Performed by: PSYCHIATRY & NEUROLOGY

## 2022-09-12 PROCEDURE — 36415 COLL VENOUS BLD VENIPUNCTURE: CPT

## 2022-09-12 PROCEDURE — U0005 INFEC AGEN DETEC AMPLI PROBE: HCPCS

## 2022-09-12 PROCEDURE — 97116 GAIT TRAINING THERAPY: CPT

## 2022-09-12 PROCEDURE — 65270000029 HC RM PRIVATE

## 2022-09-12 PROCEDURE — 74011250637 HC RX REV CODE- 250/637: Performed by: HOSPITALIST

## 2022-09-12 PROCEDURE — 83735 ASSAY OF MAGNESIUM: CPT

## 2022-09-12 PROCEDURE — 99232 SBSQ HOSP IP/OBS MODERATE 35: CPT | Performed by: INTERNAL MEDICINE

## 2022-09-12 PROCEDURE — 74011250636 HC RX REV CODE- 250/636: Performed by: HOSPITALIST

## 2022-09-12 PROCEDURE — 74011000250 HC RX REV CODE- 250: Performed by: INTERNAL MEDICINE

## 2022-09-12 PROCEDURE — 80048 BASIC METABOLIC PNL TOTAL CA: CPT

## 2022-09-12 PROCEDURE — 74011250637 HC RX REV CODE- 250/637: Performed by: PSYCHIATRY & NEUROLOGY

## 2022-09-12 PROCEDURE — 77030038269 HC DRN EXT URIN PURWCK BARD -A

## 2022-09-12 PROCEDURE — 97535 SELF CARE MNGMENT TRAINING: CPT

## 2022-09-12 PROCEDURE — 84100 ASSAY OF PHOSPHORUS: CPT

## 2022-09-12 RX ADMIN — ALPRAZOLAM 0.5 MG: 0.5 TABLET ORAL at 00:17

## 2022-09-12 RX ADMIN — DIBASIC SODIUM PHOSPHATE, MONOBASIC POTASSIUM PHOSPHATE AND MONOBASIC SODIUM PHOSPHATE 2 TABLET: 852; 155; 130 TABLET ORAL at 18:37

## 2022-09-12 RX ADMIN — THIAMINE HCL TAB 100 MG 100 MG: 100 TAB at 10:05

## 2022-09-12 RX ADMIN — DIBASIC SODIUM PHOSPHATE, MONOBASIC POTASSIUM PHOSPHATE AND MONOBASIC SODIUM PHOSPHATE 2 TABLET: 852; 155; 130 TABLET ORAL at 10:05

## 2022-09-12 RX ADMIN — THERA TABS 1 TABLET: TAB at 10:04

## 2022-09-12 RX ADMIN — ASPIRIN 81 MG: 81 TABLET, CHEWABLE ORAL at 10:05

## 2022-09-12 RX ADMIN — LISINOPRIL 20 MG: 20 TABLET ORAL at 10:05

## 2022-09-12 RX ADMIN — FOLIC ACID 1 MG: 1 TABLET ORAL at 10:05

## 2022-09-12 RX ADMIN — PANTOPRAZOLE SODIUM 40 MG: 40 TABLET, DELAYED RELEASE ORAL at 10:05

## 2022-09-12 RX ADMIN — PHENAZOPYRIDINE 100 MG: 100 TABLET ORAL at 13:36

## 2022-09-12 RX ADMIN — MICONAZOLE NITRATE 2 % TOPICAL POWDER: at 19:22

## 2022-09-12 RX ADMIN — Medication 10 ML: at 21:34

## 2022-09-12 RX ADMIN — ENOXAPARIN SODIUM 40 MG: 100 INJECTION SUBCUTANEOUS at 10:04

## 2022-09-12 RX ADMIN — CIPROFLOXACIN 500 MG: 500 TABLET, FILM COATED ORAL at 10:04

## 2022-09-12 RX ADMIN — Medication 10 ML: at 13:40

## 2022-09-12 RX ADMIN — CIPROFLOXACIN 500 MG: 500 TABLET, FILM COATED ORAL at 21:33

## 2022-09-12 RX ADMIN — ALPRAZOLAM 0.5 MG: 0.5 TABLET ORAL at 21:33

## 2022-09-12 RX ADMIN — AMLODIPINE BESYLATE 5 MG: 5 TABLET ORAL at 10:05

## 2022-09-12 RX ADMIN — SERTRALINE HYDROCHLORIDE 100 MG: 50 TABLET ORAL at 10:05

## 2022-09-12 RX ADMIN — PHENAZOPYRIDINE 100 MG: 100 TABLET ORAL at 18:37

## 2022-09-12 RX ADMIN — MICONAZOLE NITRATE 2 % TOPICAL POWDER: at 10:06

## 2022-09-12 RX ADMIN — PHENAZOPYRIDINE 100 MG: 100 TABLET ORAL at 10:05

## 2022-09-12 NOTE — PROGRESS NOTES
Neurology Hospital Progress Note    Patient ID:  Mary Anne Jose  [id number]  65 y.o.  1945      Subjective:   History:  Mary Anne Jose is a 65 y.o. female who T12 fracutre who was admitted on 9/5/22 for  feeling \"horrible for several days\" with night sweats, chills, nausea and vomiting, unable to ambulate and found to have chronic cystitis with UTI and sepsis. (+) EColi in blood and urine. During admission, patient noted to have gait issues descrbed as \"being off balanced with weakness of the R leg\" for 1 yr. (-) tremor (-) assistive device    ROS:  Per HPI-  Otherwise the remainder of the review of system was negative      Social Hx:  Social History     Socioeconomic History    Marital status:    Tobacco Use    Smoking status: Never    Smokeless tobacco: Never   Substance and Sexual Activity    Alcohol use: No       Meds:  No current facility-administered medications on file prior to encounter. Current Outpatient Medications on File Prior to Encounter   Medication Sig Dispense Refill    ondansetron (ZOFRAN ODT) 4 mg disintegrating tablet       lisinopril (PRINIVIL, ZESTRIL) 20 mg tablet Take  by mouth daily. amLODIPine (NORVASC) 5 mg tablet Take 5 mg by mouth daily. omeprazole (PRILOSEC) 20 mg capsule Take 20 mg by mouth daily. metoclopramide HCl (REGLAN) 5 mg tablet Take 5 mg by mouth Before breakfast, lunch, and dinner. ALPRAZolam (XANAX) 0.5 mg tablet Take  by mouth. sertraline (ZOLOFT) 100 mg tablet Take  by mouth daily. Imaging:    CT Results (recent):  Results from Hospital Encounter encounter on 09/05/22    CT ABD PELV WO CONT    Narrative  EXAM: CT ABD PELV WO CONT    INDICATION: lower abd pain    COMPARISON: None    IV CONTRAST: None. ORAL CONTRAST: None. TECHNIQUE:  Thin axial images were obtained through the abdomen and pelvis. Coronal and  sagittal reformats were generated.  CT dose reduction was achieved through use of  a standardized protocol tailored for this examination and automatic exposure  control for dose modulation. The absence of intravenous and oral contrast material reduces the capacity of CT  to evaluate the bowel, vasculature and solid organs. FINDINGS:  LOWER THORAX: Multifocal areas of pulmonary groundglass opacification with  consolidation in the medial segment of the right middle lobe also noted. Clinical correlation is recommended. The findings are nonspecific and can be  seen in various conditions including inflammatory or infectious etiologies. COVID-19 pneumonia could have this appearance. LIVER: Hepatomegaly with hepatic length of 21 cm. No hepatic mass or  intrahepatic bile duct dilation demonstrated. BILIARY TREE: Several tiny dependent gallstones. Mild gallbladder distention. CBD is not dilated. SPLEEN: Splenomegaly with length of 13.4 cm. PANCREAS: No focal abnormality. ADRENALS: Unremarkable. KIDNEYS/URETERS: 3.9 cm exophytic lesion of the inferior pole of left kidney  consistent with cyst.  2. Right inferior pole nonobstructing calculi measuring 14 mm and 9 mm. Right  perinephric stranding with mild right renal pelvocaliectasis to the level of the  ureteropelvic junction where a transition to normal caliber ureter is shown. There is a calcification in the lower pelvis measuring 4 mm in size, located  approximately 2 cm proximal to the uterovesical junction. This might represent a  retained renal calculus. STOMACH: Unremarkable. SMALL BOWEL: No dilatation or wall thickening. COLON: Diffuse colonic diverticulosis no demonstration of diverticulitis. A  rectosigmoid anastomosis is shown. APPENDIX: Normal.  PERITONEUM: No ascites or pneumoperitoneum. RETROPERITONEUM: Moderately abundant atherosclerotic calcifications. No  aneurysm. No intracranial mass or adenopathy. REPRODUCTIVE ORGANS: Unremarkable. URINARY BLADDER: No mass or calculus. BONES: Moderate diffuse osteopenia.  Chronic appearing moderate to severe  vertebral compression fracture at T12 including retropositioning of the  posterior cortex of the vertebral body measuring up to 6 mm compared with  adjacent levels. Moderate to severe L1-2 through L4-5 spondylosis is shown. ABDOMINAL WALL: No mass or hernia. ADDITIONAL COMMENTS: N/A    Impression  1. Multifocal areas of pulmonary groundglass opacification and patchy  consolidation in medial segment of right middle lobe for which clinical  correlation is recommended as above. 2. Hepatosplenomegaly. 3. Several tiny dependent gallstones. Mild gallbladder distention. 3. 2 nonobstructing calculi in the inferior pole the right kidney measuring 14  mm and 9 mm. 4. Right perinephric fat stranding and mild right renal pelvocaliectasis with  abrupt transition at UPJ. The ureters distal to this point is of normal caliber. There is a 4 mm calcification in the pelvis approximately 2 cm proximal to the  UVJ which might represent a retained renal calculus. 5. Diffuse colonic diverticulosis. No diverticulitis demonstrated. Rectosigmoid  anastomosis is noted. 6. Chronic appearing moderate to severe T12 vertebral compression fracture. MRI Results (recent):  Results from [place] encounter on 09/05/22    MRI BRAIN WO CONT    Narrative  CLINICAL HISTORY: gait disturbance; evaluate for stroke. INDICATION: gait disturbance; evaluate for stroke. COMPARISON: 9/10/2022    TECHNIQUE: MR examination of the brain includes axial and sagittal T1, coronal  T2, axial T2, axial FLAIR, axial gradient echo, axial DWI. CONTRAST: None    FINDINGS:  There is no intracranial mass, hemorrhage or evidence of acute infarction. Periventricular scattered foci of increased T2 signal intensity in the cerebral  white matter. There is sulcal and ventricular prominence. The brain architecture is normal. There is no evidence of midline shift or  mass-effect. There are no extra-axial fluid collections.  There is no Chiari or  syrinx. The pituitary and infundibulum are grossly unremarkable. There is no  skull base mass. Cerebellopontine angles are grossly unremarkable. The major  intracranial vascular flow-voids are unremarkable. The cavernous sinuses are  symmetric. Optic chiasm and infundibulum grossly unremarkable. Orbits are  grossly symmetric. Dural venous sinuses are grossly patent. The mastoid air cells are well pneumatized and clear. Impression  Mild chronic microvascular ischemic change and moderate cerebral atrophy  There is no intracranial mass, hemorrhage or evidence of acute infarction. No acute intracranial process is demonstrated. IR Results (recent):  No results found for this or any previous visit. VAS/US Results (recent):  No results found for this or any previous visit. Reviewed records in [facility] and [facility] tab today    Lab Review     No results displayed because visit has over 200 results. Objective:       Exam:  Visit Vitals  BP (!) 152/73 (BP 1 Location: Right upper arm, BP Patient Position: At rest)   Pulse 73   Temp 97.9 °F (36.6 °C)   Resp 18   Ht 5' 4\" (1.626 m)   Wt 56.8 kg (125 lb 4.8 oz)   SpO2 96%   BMI 21.51 kg/m²     Gen: Awake, alert, follows commands  Appropriate appearance, normal speech. Oriented to all spheres. No visual field defect on confrontation exam.  Full eyes movement, with no nystagmus, no diplopia, no ptosis. Normal gag and swallow. All remaining cranial nerves were normal  Motor: normal bulk and tone, no tremor              Strength: 5/5 all four extremities  (-) tremors  Some rigidity  (-) bradykinesia  Sensory: intact to LT, PP, vibration, and temperature  Reflexes: 1+ throughout; Down going toes  Coordination: Good FTN and HTS  Gait: refused to be tested    Assessment:     1. Sepsis with acute renal failure without septic shock, due to unspecified organism, unspecified acute renal failure type ([address]    2. ALBINA (acute kidney injury) ([address]    3. Elevated troponin    4. Leukocytosis, unspecified type    5. Nausea and vomiting in adult    6. Suprapubic abdominal pain    7. Pyelonephritis    8. Bacteremia due to Escherichia coli    9. Gait disturbance [R26.9 (ICD-10-CM)]        Considerations include gait disturbance, multifactorial (I.e. R hip issue,  chronic alcohol use 3 ozs of vodka /day leading to ataxia). No tremors on exam which makes typical Parkinson's disease less likely. MRI brain no acute stroke     MRA brain severe stenosis R MCA  MRA neck mild to moderate stenosis of the distal cavernous/supraclinoid ICA on the right  and on the left  MRI cervical spine: C6-C7: Small disc osteophytic bulge with bilateral uncovertebral degenerative  change. Canal stenosis is mild to moderate with mild to moderate narrowing of  the foramen  MRI thoracic spine: Chronic fracture of T12. No acute fracture or significant degenerative change; Cord signal and enhancement pattern are normal  MRI lumbar spine: Early stress fracture/deformity of the pedicle on the right at L5. Plan:   1. I had a long discussion with patient and friend. Discussed diagnosis, prognosis, pathophysiology and available treatment. Reviewed test results. All questions were answered. 2. Discussed MRI brain showed no acute stroke  3. Recommend medical management of MCA stenosis. Placed on ASA 81  4. LDL 63.2 but . Patient refused being placed on cholesterol medication  5. Physical therapy  6. Treat UTI  7. Advise to quit alcohol  8. Start Thiamine 100 mg every day     Will sign off at this point. Please call for questions    35 mins of time spent, 50% of which was spent on counseling and coordination of care.       Marcus Glover MD  Diplomate, American Board of Psychiatry and Neurology  Diplomate, Neuromuscular Medicine  Diplomate, American Board of Electrodiagnostic Medicine

## 2022-09-12 NOTE — PROGRESS NOTES
[facility] Antony [facility]  [address] [facility], [address] [place] [address]  [phone]      Hospitalist Progress Note      NAME: Laura Bardales   :  1945  MRM:  [id number]    Date/Time of service: 2022  12:51 PM       Subjective:     Chief Complaint:  Patient was personally seen and examined by me during this time period. Chart reviewed. Still with weakness. No fevers       Objective:       Vitals:       Last 24hrs VS reviewed since prior progress note.  Most recent are:    Visit Vitals  BP (!) 143/74 (BP 1 Location: Right upper arm)   Pulse 80   Temp 98.1 °F (36.7 °C)   Resp 16   Ht 5' 4\" (1.626 m)   Wt 56.8 kg (125 lb 4.8 oz)   SpO2 96%   BMI 21.51 kg/m²     SpO2 Readings from Last 6 Encounters:   22 96%   17 97%   07/06/15 95%          Intake/Output Summary (Last 24 hours) at 2022 1251  Last data filed at 2022 4226  Gross per 24 hour   Intake --   Output 1300 ml   Net -1300 ml        Exam:     Physical Exam:    Gen:  frail, ill-appearing, NAD  HEENT:  Pink conjunctivae, PERRL, hearing intact to voice, moist mucous membranes  Neck:  Supple, without masses, thyroid non-tender  Resp:  No accessory muscle use, clear breath sounds without wheezes rales or rhonchi  Card:  No murmurs, normal S1, S2 without thrills, bruits or peripheral edema  Abd:  Soft, non-tender, non-distended, normoactive bowel sounds are present  Musc:  No cyanosis or clubbing  Skin:  No rashes  Neuro:  Cranial nerves 3-12 are grossly intact, generalized weakness, follows commands appropriately  Psych:  Good insight, oriented to person, place and time, alert    Medications Reviewed: (see below)    Lab Data Reviewed: (see below)    ______________________________________________________________________    Medications:     Current Facility-Administered Medications   Medication Dose Route Frequency    thiamine mononitrate (B-1) tablet 100 mg  100 mg Oral DAILY    miconazole (MICOTIN) 2 % powder   Topical BID    phenazopyridine (PYRIDIUM) tablet 100 mg  100 mg Oral TIDPC    therapeutic multivitamin (THERAGRAN) tablet 1 Tablet  1 Tablet Oral DAILY    folic acid (FOLVITE) tablet 1 mg  1 mg Oral DAILY    phosphorus (K PHOS NEUTRAL) 250 mg tablet 2 Tablet  2 Tablet Oral BID    enoxaparin (LOVENOX) injection 40 mg  40 mg SubCUTAneous Q24H    aspirin chewable tablet 81 mg  81 mg Oral DAILY    ciprofloxacin HCl (CIPRO) tablet 500 mg  500 mg Oral Q12H    lisinopriL (PRINIVIL, ZESTRIL) tablet 20 mg  20 mg Oral DAILY    loperamide (IMODIUM) capsule 2 mg  2 mg Oral Q4H PRN    sodium chloride (NS) flush 5-10 mL  5-10 mL IntraVENous PRN    ALPRAZolam (XANAX) tablet 0.5 mg  0.5 mg Oral QHS PRN    amLODIPine (NORVASC) tablet 5 mg  5 mg Oral DAILY    pantoprazole (PROTONIX) tablet 40 mg  40 mg Oral ACB    sertraline (ZOLOFT) tablet 100 mg  100 mg Oral DAILY    lactated Ringers infusion  75 mL/hr IntraVENous CONTINUOUS    sodium chloride (NS) flush 5-40 mL  5-40 mL IntraVENous Q8H    sodium chloride (NS) flush 5-40 mL  5-40 mL IntraVENous PRN    acetaminophen (TYLENOL) tablet 650 mg  650 mg Oral Q6H PRN    Or    acetaminophen (TYLENOL) suppository 650 mg  650 mg Rectal Q6H PRN    polyethylene glycol (MIRALAX) packet 17 g  17 g Oral DAILY PRN    ondansetron (ZOFRAN ODT) tablet 4 mg  4 mg Oral Q8H PRN    Or    ondansetron (ZOFRAN) injection 4 mg  4 mg IntraVENous Q6H PRN          Lab Review:     Recent Labs     09/12/22  0407   WBC 7.6   HGB 10.8*   HCT 31.7*        Recent Labs     09/12/22  0407 09/10/22  1640    137   K 3.8 3.1*    104   CO2 26 25   GLU 97 134*   BUN 12 16   CREA 0.70 0.74   CA 8.3* 8.4*   MG 2.3 2.0   PHOS 3.5  --      No results found for: GLUCPOC       Assessment / Plan:     64 yo hx of HTN, depression, etoh abuse, presented w/ sepsis, ataxia, UTI, E. Coli bacteremia    1) Sepsis/UTI/E. Coli bacteremia: sepsis POA, now resolved.   ID recommended oral Cipro till 09/17    2) Weakness/ataxia: likely from chronic etoh abuse. Head MRI was neg for CVA. Neuro was following. Cont PT/OT. Awaiting rehab     3) Severe stenosis of right M1: incidental findings on head MRA.   Cont ASA per neuro     4) HTN: cont norvasc, lisinopril     5) Depression: cont zoloft, xanax     6) Etoh abuse: no withdrawal.  Cont vitamins    Total time spent with patient care: 35 Minutes **I personally saw and examined the patient during this time period**                 Care Plan discussed with: Patient, nursing     Discussed:  care plan    Prophylaxis:  Lovenox    Disposition:  [facility]/Rehab           ___________________________________________________    Attending Physician: Nixon Last MD

## 2022-09-12 NOTE — PROGRESS NOTES
Bedside shift change report given to Deepak Hernandez RN  (oncoming nurse) by Diomedes Rocha LPN  (offgoing nurse). Report included the following information SBAR, Kardex, MAR, and Recent Results.

## 2022-09-12 NOTE — PROGRESS NOTES
[facility] Progress Note           Mayo Garay DO    [phone] Office  [phone]  Fax    2022      Assessment & Plan:     E. coli bacteremia due to urinary tract infection. Urology has been consulted and there are no acute interventions planned. QTc stable on ciprofloxacin. Plan discharge on ciprofloxacin 500 mg p.o. twice daily through 2022. Pa  Leukocytosis. Due to above. Resolved. Elevated creatinine. Now within normal limits. Follow   History of sulfa allergy. This caused a rash. Metronidazole and nitrofurantoin cause nausea  Elevated troponin. Follow          Subjective:     No complaints. Objective:     Vitals: Visit Vitals  BP (!) 143/74 (BP 1 Location: Right upper arm)   Pulse 80   Temp 98.1 °F (36.7 °C)   Resp 16   Ht 5' 4\" (1.626 m)   Wt 125 lb 4.8 oz (56.8 kg)   SpO2 96%   BMI 21.51 kg/m²          Tmax:  Temp (24hrs), Av.2 °F (36.8 °C), Min:97.9 °F (36.6 °C), Max:98.4 °F (36.9 °C)      Exam:   Patient is intubated:  no    Physical Examination:   General:  Alert, cooperative, no distress   Head:  Normocephalic, atraumatic. Eyes:  Conjunctivae clear   Neck: Supple       Lungs:   No distress. Chest wall:     Heart:     Abdomen:   non-distended   Extremities: Moves all. Skin: No acute rash on exposed skin   Neurologic:      Labs:        No lab exists for component: ITNL   No results for input(s): CPK, CKMB, TROIQ in the last 72 hours. Recent Labs     22  0407 09/10/22  1640    137   K 3.8 3.1*    104   CO2 26 25   BUN 12 16   CREA 0.70 0.74   GLU 97 134*   PHOS 3.5  --    MG 2.3 2.0   WBC 7.6  --    HGB 10.8*  --    HCT 31.7*  --      --        No results for input(s): INR, PTP, APTT, INREXT, INREXT in the last 72 hours.   Needs: urine analysis, urine sodium, protein and creatinine  Lab Results   Component Value Date/Time    Sodium,urine random 42 2022 10:00 AM    Creatinine, urine 88.00 2022 10:00 AM         Cultures:     No results found for: SDES  Lab Results   Component Value Date/Time    Culture result: NO GROWTH 5 DAYS 09/07/2022 04:00 AM    Culture result: ESCHERICHIA COLI (A) 09/05/2022 10:21 PM    Culture result: (A) 09/05/2022 06:40 PM     ESCHERICHIA COLI GROWING IN BOTH BOTTLES DRAWN (SITE = L ARM) REFER TO [id number] FOR SENSITIVITIES       Radiology:     Medications       Current Facility-Administered Medications   Medication Dose Route Frequency Last Admin    thiamine mononitrate (B-1) tablet 100 mg  100 mg Oral DAILY 100 mg at 09/12/22 1005    miconazole (MICOTIN) 2 % powder   Topical BID Given at 09/12/22 1006    phenazopyridine (PYRIDIUM) tablet 100 mg  100 mg Oral TIDPC 100 mg at 09/12/22 1005    therapeutic multivitamin [facility]) tablet 1 Tablet  1 Tablet Oral DAILY 1 Tablet at 59/77/58 3568    folic acid (FOLVITE) tablet 1 mg  1 mg Oral DAILY 1 mg at 09/12/22 1005    phosphorus (K PHOS NEUTRAL) 250 mg tablet 2 Tablet  2 Tablet Oral BID 2 Tablet at 09/12/22 1005    enoxaparin (LOVENOX) injection 40 mg  40 mg SubCUTAneous Q24H 40 mg at 09/12/22 1004    aspirin chewable tablet 81 mg  81 mg Oral DAILY 81 mg at 09/12/22 1005    ciprofloxacin HCl (CIPRO) tablet 500 mg  500 mg Oral Q12H 500 mg at 09/12/22 1004    lisinopriL (PRINIVIL, ZESTRIL) tablet 20 mg  20 mg Oral DAILY 20 mg at 09/12/22 1005    loperamide (IMODIUM) capsule 2 mg  2 mg Oral Q4H PRN 2 mg at 09/11/22 2043    sodium chloride (NS) flush 5-10 mL  5-10 mL IntraVENous PRN      ALPRAZolam (XANAX) tablet 0.5 mg  0.5 mg Oral QHS PRN 0.5 mg at 09/12/22 0017    amLODIPine (NORVASC) tablet 5 mg  5 mg Oral DAILY 5 mg at 09/12/22 1005    pantoprazole (PROTONIX) tablet 40 mg  40 mg Oral ACB 40 mg at 09/12/22 1005    sertraline (ZOLOFT) tablet 100 mg  100 mg Oral DAILY 100 mg at 09/12/22 1005    lactated Ringers infusion  75 mL/hr IntraVENous CONTINUOUS 75 mL/hr at 09/11/22 2044    sodium chloride (NS) flush 5-40 mL  5-40 mL IntraVENous Q8H 10 mL at 09/11/22 2044    sodium chloride (NS) flush 5-40 mL  5-40 mL IntraVENous PRN      acetaminophen (TYLENOL) tablet 650 mg  650 mg Oral Q6H  mg at 09/08/22 2047    Or    acetaminophen (TYLENOL) suppository 650 mg  650 mg Rectal Q6H PRN      polyethylene glycol (MIRALAX) packet 17 g  17 g Oral DAILY PRN      ondansetron (ZOFRAN ODT) tablet 4 mg  4 mg Oral Q8H PRN      Or    ondansetron (ZOFRAN) injection 4 mg  4 mg IntraVENous Q6H PRN             Case discussed with      Caroline John, DO

## 2022-09-12 NOTE — PROGRESS NOTES
Bedside and Verbal shift change report given to Keri Hannah RN (oncoming nurse) by Amelia Kendall RN (offgoing nurse). Report included the following information SBAR and Kardex.

## 2022-09-12 NOTE — PROGRESS NOTES
9/12/2022    1:54 PM  CM notified by [facility] that they have found that they are OON with pt's insurance. Pt's  notified. Pt deferred placement decisions to his brother, [place] España. CM attempted p/c to [place], and is awaiting a returned p/c.     12:03 PM  Care Management Progress Note      ICD-10-CM ICD-9-CM    1. Sepsis with acute renal failure without septic shock, due to unspecified organism, unspecified acute renal failure type ([facility] [address]  A41.9 038.9     R65.20 995.92     N17.9 584.9       2. ALBINA (acute kidney injury) ([facility] [address]  N17.9 584.9       3. Elevated troponin  R77.8 790.6       4. Leukocytosis, unspecified type  D72.829 288.60       5. Nausea and vomiting in adult  R11.2 787.01       6. Suprapubic abdominal pain  R10.2 789.09       7. Pyelonephritis  N12 590.80       8. Bacteremia due to Escherichia coli  R78.81 790.7     B96.20 041.49       9. Gait disturbance [R26.9 (ICD-10-CM)]  R26.9 781.2           RUR:  9%  Risk Level: [x]Low []Moderate []High  Value-based purchasing: [] Yes [x] No  Bundle patient: [] Yes [x] No   Specify:     Transition of care plan:  Awaiting medical clearance and DC order. PT/OT treating. ID following. TBD - [facility] (preference) accepted, and they are initiating auth. PCR requested from attending for placement. Outpatient follow-up. Transport need TBD.

## 2022-09-12 NOTE — PROGRESS NOTES
Problem: Mobility Impaired (Adult and Pediatric)  Goal: *Acute Goals and Plan of Care (Insert Text)  Description: FUNCTIONAL STATUS PRIOR TO ADMISSION: Pt reports impaired balance and several falls in recent months. She reports ambulating without assistive device and frequently feels as though her Jarred Cowper is off\" but is unable to offer further description. Some pt reports are inconsistent over course of encounter, including duration of R proximal leg pain. Per OT following OT conversation with spouse pt has experienced limited mobility x 2-3 years with increased decline over past 6 months. She ambulates with shuffling gait and no assistive device. She reportedly experiences episodes of limited gross motor abilities and inability to ambulate with increased difficulty in mornings vs afternoons. HOME SUPPORT PRIOR TO ADMISSION: The patient lived with spouse who reports concern regarding ability to care for patient at home with current impairments. Physical Therapy Goals  Initiated 9/10/2022  1. Patient will move from supine to sit and sit to supine  in bed with moderate assistance  within 7 day(s). 2.  Patient will transfer from bed to chair and chair to bed with moderate assistance  using the least restrictive device within 7 day(s). 3.  Patient will perform sit to stand with moderate assistance 3/3 trials within 7 day(s). 4.  Patient will ambulate with moderate assistance  for 10 feet with the least restrictive device within 7 day(s). 5.  Patient will perform static and dynamic sitting activities x 10 mins with supervision within 7 days. Outcome: Progressing Towards Goal   PHYSICAL THERAPY TREATMENT  Patient: Aditi Villarreal (84 y.o. female)  Date: 9/12/2022  Diagnosis: UTI (urinary tract infection) [N39.0] <principal problem not specified>      Precautions: Fall, Skin  Chart, physical therapy assessment, plan of care and goals were reviewed.     ASSESSMENT  Patient continues with skilled PT services and is progressing towards goals. She presents with improved static balance and positioning as well as improving transfer abilities. She progresses to gait training with max A x 2 and manual assistance as below. Pt verbalizes performance of cervical AROM exercises and attention to trunk and neck positioning since prior PT visit. She tolerates treatment today without complaints and appears motivated to improve functional status. Given presentation today she is likely to tolerate 3 hours therapy/day at [facility]. Current Level of Function Impacting Discharge (mobility/balance): max A x 2 ambulation    Other factors to consider for discharge: none additional         PLAN :  Patient continues to benefit from skilled intervention to address the above impairments. Continue treatment per established plan of care. to address goals. Recommendation for discharge: (in order for the patient to meet his/her long term goals)  Therapy 3 hours per day 5-7 days per week    This discharge recommendation:  Has been made in collaboration with the attending provider and/or case management    IF patient discharges home will need the following DME: to be determined (TBD)       SUBJECTIVE:   Patient stated, \"I've been trying to get it straight, re: neck positioning and posture. Pt received supine, agreeable to PT and cleared by RN. Pt with nearly midline head and neck positioning, comfortably attending to environment in all directions. OBJECTIVE DATA SUMMARY:   Critical Behavior:  Neurologic State: Alert  Orientation Level: Oriented to person, Oriented to place  Cognition: Impaired decision making, Follows commands  Safety/Judgement: Decreased insight into deficits  Functional Mobility Training:  Bed Mobility:  Rolling: Additional time;Minimum assistance; Adaptive equipment  Supine to Sit: Minimum assistance; Additional time; Adaptive equipment;Bed Modified     Scooting: Maximum assistance        Transfers:  Sit to Stand: Assist x2;Additional time; Moderate assistance;   Stand to Sit: Assist x2; Additional time; Moderate assistance        Bed to Chair: Additional time;Assist x2; Moderate assistance               Performs bed to commode transfer via stand and furniture rearrangement due to incontinence and urgency. Performs sit to stand transfer for static balance activities x 3 mins  Performs additional transfer for gait as below. Balance:  Sitting: Without support - sits with proper midline immediately. Sitting - Static: Good (unsupported)  Sitting - Dynamic: Fair (occasional)  Standing: Impaired (posterior lean, flexed knees, flexed trunk)  Standing - Static: Poor  Standing - Dynamic : Poor  Ambulation/Gait Training:  Distance (ft): 7 Feet (ft)  Assistive Device: Walker, rolling;Gait belt  Ambulation - Level of Assistance: Additional time;Assist x2;Maximum assistance        Gait Abnormalities: Decreased step clearance        Base of Support: Widened     Speed/Valerie: Pace decreased (<100 feet/min)                     Manual cues for upright posture, anterior weight shift, UE compensation for support, step length and placement. Therapeutic Exercises: Ankle pumps x 10  Pain Rating:  No pain complaints    Activity Tolerance:   requires rest breaks    After treatment patient left in no apparent distress:   Sitting in chair, Call bell within reach, and Bed / chair alarm activated    COMMUNICATION/COLLABORATION:   The patients plan of care was discussed with: Occupational therapist, Registered nurse, and Rehabilitation technician.      James Orozco PT, DPT   Time Calculation: 48 mins

## 2022-09-12 NOTE — PROGRESS NOTES
Problem: Dysphagia (Adult)  Goal: *Acute Goals and Plan of Care (Insert Text)  Description: 9/10/2022  Speech path goals  1. Patient will participate with imaging of swallow as indicated. 2. Patient will tolerate reg/thins with no overt s/s of aspiration. Outcome: Progressing Towards Goal   SPEECH LANGUAGE PATHOLOGY DYSPHAGIA TREATMENT  Patient: Eladio Camargo (7 y.o. female)  Date: 9/12/2022  Diagnosis: UTI (urinary tract infection) [N39.0] <principal problem not specified>      Precautions: aspiration Fall, Skin    ASSESSMENT:  Patient continue with mild dysarthria that improved when oral moisture by drinking. No overt s/s aspiration, but she does have mild L oral residue and leakage with solids. Residue cleared eventually without cues. PLAN:  Recommendations and Planned Interventions:  Continue diet as tolerated. Watch for L pocketing of pills. Patient continues to benefit from skilled intervention to address the above impairments. Continue treatment per established plan of care. Discharge Recommendations: To Be Determined     SUBJECTIVE:   Patient stated You are talking about issues I didn't know I had.(L facial droop and tongue deviation noted Sunday by PT)    OBJECTIVE:   Cognitive and Communication Status:  Neurologic State: Alert  Orientation Level: Oriented to person, Oriented to place  Cognition: Impaired decision making, Follows commands  Perception: Appears intact  Perseveration: No perseveration noted  Safety/Judgement: Decreased insight into deficits  Dysphagia Treatment:  Oral Assessment:  Oral Assessment  Labial: No impairment  Dentition: Limited;Natural (80%; mildly dry and sticky)  Oral Hygiene: mildly dry and sticky  Lingual: No impairment  P.O.  Trials:  Patient Position: upright in bed  Vocal quality prior to P.O.:  (mild dysarthria at beginning of session, improved as mouth moistened with liquids)         ORAL PHASE:     Bolus Formation/Control: Impaired (slow chew, R oral residue that eventually cleared. L oral leakage-mild with solids)  Type of Impairment: Mastication  Propulsion: Delayed (# of seconds) (mild)  Oral Residue: Left  PHARYNGEAL PHASE:   Initiation of Swallow: No impairment  Laryngeal Elevation: Functional  Aspiration Signs/Symptoms: None  Pharyngeal Phase Characteristics: No impairment, issues, or problems          RN reports no issues with pills and no s/s aspiration over the weekend. Exercises:  Laryngeal Exercises:                                                                                                                                   Pain:  Pain Scale 1: Numeric (0 - 10)  Pain Intensity 1: 0       After treatment:   Patient left in no apparent distress in bed and Nursing notified    COMMUNICATION/EDUCATION:   Patient was educated regarding her deficit(s) of mild transient dysarthria and oral dysphagia  as this relates to her diagnosis of UTI. She demonstrated Good understanding as evidenced by discussion. .    The patient's plan of care including recommendations, planned interventions, and recommended diet changes were discussed with: Registered nurse.      Jayna Bhakta, SLP  Time Calculation: 20 mins

## 2022-09-12 NOTE — PROGRESS NOTES
Problem: Self Care Deficits Care Plan (Adult)  Goal: *Acute Goals and Plan of Care (Insert Text)  Description: FUNCTIONAL STATUS PRIOR TO ADMISSION: per patient and her  via (phone call) patient has had a decline in her mobility over the last ~6 months, does not use adaptive equipment, however per her  she takes \"little steps\" when walking and he stated \"has a hard time walking crisply\" Patient's  reports episodes of legs not functioning at night and inability to move them and as day progresses she can move them again. Both report decreased balance over the last mew months. Patient was active in past and a . HOME SUPPORT: The patient lived with her  but did not require assist. However recent increase in falls and decreased balance    Occupational Therapy Goals  Initiated 9/10/2022  1. Patient will perform grooming with supervision/set-up seated unsupported within 7 day(s). 2.  Patient will maintain static sitting balance with UE support at midline with min multi-modal cues > or = 1 minute within 7 day(s). 3.  Patient will maintain static standing balance with bilateral UE support > or = 1 minute with moderate assist of 1 and min cues within 7 day(s)  4. Patient will perform squat pivot transfer to bedside commode with moderate assist of 1 within 7 day(s). 5.  Patient will position LE's in bridge and elevate hips with verbal cue x's 3 reps to assist with repositioning and toileting in bed within 7 day(s). 6.  Patient will perform cervical ROM and bilateral UE AROM with min cues within 7 day(s). Outcome: Progressing Towards Goal   OCCUPATIONAL THERAPY TREATMENT  Patient: Diane Cespedes (59 y.o. female)  Date: 9/12/2022  Diagnosis: UTI (urinary tract infection) [N39.0] <principal problem not specified>      Precautions: Fall, Skin  Chart, occupational therapy assessment, plan of care, and goals were reviewed.     ASSESSMENT  Patient continues with skilled OT services and is progressing towards goals. Ms. Dianne Leonard was received sitting EOB with staff agreeable and motivated for therapy. She was educated on adaptive LB dressing technique and performed with up to mod A. Patient found to be incontinent of bowel. She performed stand-pivot transfer to [facility] to complete toileting and further hygiene. Patient required x2 person assist for ADL transfer and education provided for improved transfer technique, hand placement, and overall posture + body mechanics with upright activity. Patient required up to max A for toileting and min A for gown change. Patient agreeable to remain OOB at end of tx. She followed 100% of commands and highly motivated for OT interventions. Patient would benefit from continued skilled OT to progress towards goals and improve overall independence. PLAN :  Patient continues to benefit from skilled intervention to address the above impairments. Continue treatment per established plan of care to address goals. Recommend with staff:   Recommend patient be OOB to chair as frequently as tolerated; Goal of 3x/day for all meals for 60 minutes at a time. For toileting needs, recommend [facility] transfers with x2 staff member assist or use of Daniella Caras. Encourage patient involvement in personal care as able. Encourage exercises frequently throughout the day. Recommend next OT session: Continue towards set OT goals. Recommendation for discharge: (in order for the patient to meet his/her long term goals)  Therapy 3 hours per day 5-7 days per week    This discharge recommendation:  Has been made in collaboration with the attending provider and/or case management    IF patient discharges home will need the following DME: none       SUBJECTIVE:   Patient agreeable to OT treatment.       OBJECTIVE DATA SUMMARY:   Cognitive/Behavioral Status:  Neurologic State: Alert  Orientation Level: Oriented to person;Oriented to place  Cognition: Impaired decision making; Follows commands  Perception: Appears intact  Perseveration: No perseveration noted  Safety/Judgement: Decreased insight into deficits    Functional Mobility and Transfers for ADLs:  Bed Mobility:  Rolling: Additional time;Minimum assistance; Adaptive equipment  Supine to Sit: Minimum assistance; Additional time; Adaptive equipment;Bed Modified  Sit to Supine:  (pt remained up at end of tx)  Scooting: Maximum assistance    Transfers:  Sit to Stand: Assist x2; Additional time; Moderate assistance  Functional Transfers  Toilet Transfer : Moderate assistance;Assist x2; Additional time (stand-pivot transfer to [facility])  Bed to Chair: Additional time;Assist x2; Moderate assistance    Balance:  Sitting: Without support  Sitting - Static: Good (unsupported)  Sitting - Dynamic: Fair (occasional)  Standing: Impaired (posterior lean, flexed knees, flexed trunk)  Standing - Static: Poor  Standing - Dynamic : Poor    ADL Intervention:  Upper Body Dressing Assistance  Dressing Assistance: Minimum assistance  Shirt simulation with hospital gown: Minimum  assistance  Cues: Verbal cues provided    Lower Body Dressing Assistance  Dressing Assistance: Moderate assistance  Socks: Moderate assistance  Leg Crossed Method Used: Yes  Position Performed: Seated edge of bed  Cues: Verbal cues provided    Toileting  Toileting Assistance: Maximum assistance  Bladder Hygiene: Moderate assistance  Bowel Hygiene: Maximum assistance  Clothing Management: Maximum assistance  Cues: Verbal cues provided    Cognitive Retraining  Safety/Judgement: Decreased insight into deficits      Activity Tolerance:   Good and Fair    After treatment patient left in no apparent distress:   Sitting in chair, Call bell within reach, and Bed / chair alarm activated    COMMUNICATION/COLLABORATION:   The patients plan of care was discussed with: Physical therapist, Registered nurse, and patient .      Stephen Marroquin OTR/L  Time Calculation: 48 mins

## 2022-09-12 NOTE — PROGRESS NOTES
Comprehensive Nutrition Assessment    Type and Reason for Visit: Reassess    Nutrition Recommendations/Plan:   Change ONS to Ensure High Calorie/High Protein BID to promote intake of protein & calories provides 350 kcals, 45 g carb, 20 g protein each    Please document intake of Supplements on flow sheets     Assist with tray/meal set up including cutting up meats & opening containers     Malnutrition Assessment:  Malnutrition Status:  Mild malnutrition (09/08/22 1413)    Context:  Acute illness     Findings of the 6 clinical characteristics of malnutrition:   Nutrition Focused Physical Exam completed & wt loss noted from remote wt record (unsure timeline)  Energy Intake:  Mild decrease in energy intake (specify)  Weight Loss:  No significant weight loss     Body Fat Loss:  Mild body fat loss, Triceps   Muscle Mass Loss:  Mild muscle mass loss, Scapula (trapezius), Calf  Fluid Accumulation:  No significant fluid accumulation,     Strength:  Not performed     Nutrition Assessment:      58: 65year old female admitted with UTI (urinary tract infection) [N39.0] who  has a past medical history of Depression and Hypertension. Pt is not vomiting & less nausea today but patient is hesitant to challenge her tolerance of foods today. RD visited room & patient voiced she wasn't able to get to her entree for lunch & became frustrated. She said, \"they have me tied up & can't use this arm\". Pt was found to be disheveled in her gown. RD assisted but pt still didn't want to try to eat any more when RD offered to feed her. When asked, pt said she wouldn't want to be fed her dinner. She may benefit from evaluation from OT to be sure she is able to self-feed. 9/12: follow up. RD visited patient and noted now with OT, PT & SLP. Pt reports to RD she \"might have had a stroke and has to take lipitor and baby aspirin for narrowing of something around here\" [points to neck].  RD witnessed lunch meal. She ate only bites of chicken breast, most of wedge potatoes, 1/2 applesauce and vanilla wafers. SLP no need for dysphagia & OT following but curious if pt may benefit from chopped meats or assist to cut up foods to improve intake. Pt admits to have not had ONS from breakfast meal & is willing to try strawberry flavor. Wt is down 6 lbs in 3 days per medical record. No edema, +BMs, fair to poor meal intake and no ONS intake documented. Nutrition Related Findings:      Wound Type: None  Last Bowel Movement Date: 09/11/22  Stool Appearance: Loose  Abdominal Assessment: Intact, Soft  Appetite: Fair  Bowel Sounds: Active     Edema:No data recorded    Nutr. Labs:  Lab Results   Component Value Date/Time    GFR est AA >60 09/12/2022 04:07 AM    GFR est non-AA >60 09/12/2022 04:07 AM    Creatinine 0.70 09/12/2022 04:07 AM    BUN 12 09/12/2022 04:07 AM    Sodium 139 09/12/2022 04:07 AM    Potassium 3.8 09/12/2022 04:07 AM    Chloride 108 09/12/2022 04:07 AM    CO2 26 09/12/2022 04:07 AM     Lab Results   Component Value Date/Time    Glucose 97 09/12/2022 04:07 AM     Lab Results   Component Value Date/Time    Hemoglobin A1c 5.2 09/06/2022 09:04 AM       Nutr. Meds:  Norvasc, MVI, folic acid, phos, thiamine, Protonix, Zoloft, IVF LR @ 50 mL/hr  PRN: Miralax, Imodium, xanax    Current Nutrition Intake & Therapies:  Average Meal Intake: 26-50%  Average Supplement Intake: Unable to assess  ADULT ORAL NUTRITION SUPPLEMENT Breakfast, Dinner;  Low Calorie/High Protein  ADULT DIET Regular; Low Fat/Low Chol/High Fiber/AVILA    Documented Meal intake:  Patient Vitals for the past 168 hrs:   % Diet Eaten   09/12/22 1738 1 - 25%   09/10/22 1809 26 - 50%   09/10/22 1316 76 - 100%   09/10/22 0734 26 - 50%   09/09/22 1717 26 - 50%   09/09/22 1232 51 - 75%   09/09/22 0853 0%   09/08/22 1242 1 - 25%     Documentation of supplement intake:  Patient Vitals for the past 168 hrs:   Supplement intake %   09/10/22 1809 0%   09/10/22 1316 0%   09/10/22 0734 0%   09/09/22 1717 0%   09/09/22 1232 0%   09/09/22 0853 0%       Anthropometric Measures:  Height: 5' 4\" (162.6 cm)  Ideal Body Weight (IBW): 120 lbs (55 kg)  Admission Body Weight: 142 lb  Current Body Wt:  56.8 kg (125 lb 3.5 oz), 104.4 % IBW.  Bed scale  Current BMI (kg/m2): 21.5        Weight Adjustment: No adjustment                   BMI Category: Underweight (BMI less than 22) age over 72  Last 3 Recorded Weights in this Encounter    09/07/22 0405 09/07/22 1007 09/10/22 1417   Weight: 59.7 kg (131 lb 9.6 oz) 59.4 kg (131 lb) 56.8 kg (125 lb 4.8 oz)     Wt Readings from Last 10 Encounters:   09/10/22 56.8 kg (125 lb 4.8 oz)   02/08/17 64.4 kg (142 lb)   07/06/15 65.3 kg (144 lb)     Estimated Daily Nutrient Needs:  Energy Requirements Based On: Kcal/kg  Weight Used for Energy Requirements: Current (as of 9/10/22)  Energy (kcal/day): 1420 - 1590 kcal/d (25-28 kcal/kg)  Weight Used for Protein Requirements: Current  Protein (g/day): 45 - 63 g (0.8-1.1 g/kg)  Method Used for Fluid Requirements: 1 ml/kcal  Fluid (ml/day): 1420 - 1590 mL    Nutrition Diagnosis:   Inadequate protein-energy intake related to early satiety (difficulty with self-feeding) as evidenced by intake 0-25%, weight loss (declines assistance from RD with eating lunch meal today)    Nutrition Interventions:   Food and/or Nutrient Delivery: Continue current diet, Modify oral nutrition supplement  Nutrition Education/Counseling: Counseling initiated  Coordination of Nutrition Care: Feeding assistance/environmental change, Continue to monitor while inpatient  Plan of Care discussed with: patient    Goals:  Previous Goal Met: Progressing toward goal(s)  Goals: PO intake 50% or greater, by next RD assessment       Nutrition Monitoring and Evaluation:   Behavioral-Environmental Outcomes: Readiness for change  Food/Nutrient Intake Outcomes: Food and nutrient intake, Supplement intake  Physical Signs/Symptoms Outcomes: Chewing or swallowing, Meal time behavior, Weight    Discharge Planning:    Continue oral nutrition supplement    Audra Boucher RD, MS  Contact via [employer] or office [phone]

## 2022-09-13 LAB
ATRIAL RATE: 79 BPM
CALCULATED P AXIS, ECG09: 5 DEGREES
CALCULATED R AXIS, ECG10: -24 DEGREES
CALCULATED T AXIS, ECG11: 13 DEGREES
DIAGNOSIS, 93000: NORMAL
P-R INTERVAL, ECG05: 142 MS
Q-T INTERVAL, ECG07: 396 MS
QRS DURATION, ECG06: 70 MS
QTC CALCULATION (BEZET), ECG08: 454 MS
SARS-COV-2, XPLCVT: NOT DETECTED
SOURCE, COVRS: NORMAL
VENTRICULAR RATE, ECG03: 79 BPM

## 2022-09-13 PROCEDURE — 74011000250 HC RX REV CODE- 250: Performed by: INTERNAL MEDICINE

## 2022-09-13 PROCEDURE — 97530 THERAPEUTIC ACTIVITIES: CPT

## 2022-09-13 PROCEDURE — 74011250637 HC RX REV CODE- 250/637: Performed by: INTERNAL MEDICINE

## 2022-09-13 PROCEDURE — 97116 GAIT TRAINING THERAPY: CPT

## 2022-09-13 PROCEDURE — 74011250637 HC RX REV CODE- 250/637: Performed by: HOSPITALIST

## 2022-09-13 PROCEDURE — 74011250637 HC RX REV CODE- 250/637: Performed by: PSYCHIATRY & NEUROLOGY

## 2022-09-13 PROCEDURE — 65270000029 HC RM PRIVATE

## 2022-09-13 PROCEDURE — 97535 SELF CARE MNGMENT TRAINING: CPT

## 2022-09-13 PROCEDURE — 92526 ORAL FUNCTION THERAPY: CPT

## 2022-09-13 PROCEDURE — 74011250636 HC RX REV CODE- 250/636: Performed by: HOSPITALIST

## 2022-09-13 RX ADMIN — FOLIC ACID 1 MG: 1 TABLET ORAL at 09:52

## 2022-09-13 RX ADMIN — LISINOPRIL 20 MG: 20 TABLET ORAL at 09:52

## 2022-09-13 RX ADMIN — Medication 10 ML: at 07:00

## 2022-09-13 RX ADMIN — CIPROFLOXACIN 500 MG: 500 TABLET, FILM COATED ORAL at 09:52

## 2022-09-13 RX ADMIN — MICONAZOLE NITRATE 2 % TOPICAL POWDER: at 18:02

## 2022-09-13 RX ADMIN — Medication 10 ML: at 23:15

## 2022-09-13 RX ADMIN — PHENAZOPYRIDINE 100 MG: 100 TABLET ORAL at 09:52

## 2022-09-13 RX ADMIN — ASPIRIN 81 MG: 81 TABLET, CHEWABLE ORAL at 09:52

## 2022-09-13 RX ADMIN — PANTOPRAZOLE SODIUM 40 MG: 40 TABLET, DELAYED RELEASE ORAL at 07:00

## 2022-09-13 RX ADMIN — THERA TABS 1 TABLET: TAB at 09:52

## 2022-09-13 RX ADMIN — ENOXAPARIN SODIUM 40 MG: 100 INJECTION SUBCUTANEOUS at 09:52

## 2022-09-13 RX ADMIN — Medication 10 ML: at 14:51

## 2022-09-13 RX ADMIN — AMLODIPINE BESYLATE 5 MG: 5 TABLET ORAL at 09:52

## 2022-09-13 RX ADMIN — DIBASIC SODIUM PHOSPHATE, MONOBASIC POTASSIUM PHOSPHATE AND MONOBASIC SODIUM PHOSPHATE 2 TABLET: 852; 155; 130 TABLET ORAL at 18:02

## 2022-09-13 RX ADMIN — MICONAZOLE NITRATE 2 % TOPICAL POWDER: at 10:12

## 2022-09-13 RX ADMIN — SERTRALINE HYDROCHLORIDE 100 MG: 50 TABLET ORAL at 09:53

## 2022-09-13 RX ADMIN — THIAMINE HCL TAB 100 MG 100 MG: 100 TAB at 09:52

## 2022-09-13 RX ADMIN — DIBASIC SODIUM PHOSPHATE, MONOBASIC POTASSIUM PHOSPHATE AND MONOBASIC SODIUM PHOSPHATE 2 TABLET: 852; 155; 130 TABLET ORAL at 10:12

## 2022-09-13 RX ADMIN — CIPROFLOXACIN 500 MG: 500 TABLET, FILM COATED ORAL at 23:15

## 2022-09-13 NOTE — PROGRESS NOTES
Problem: Dysphagia (Adult)  Goal: *Acute Goals and Plan of Care (Insert Text)  Description: 9/10/2022  Speech path goals  1. Patient will participate with imaging of swallow as indicated. 2. Patient will tolerate reg/thins with no overt s/s of aspiration. Outcome: Progressing Towards Goal   SPEECH LANGUAGE PATHOLOGY DYSPHAGIA TREATMENT  Patient: Montse Vincent (14 y.o. female)  Date: 9/13/2022  Diagnosis: UTI (urinary tract infection) [N39.0] <principal problem not specified>      Precautions: aspiration Fall, Skin    ASSESSMENT:  Patient with mild oral dysphagia, characterized by mild L oral residue with solids and occasional L oral leakage. No s/s aspiration. PLAN:  Recommendations and Planned Interventions:  Continue diet as tolerated. Watch for oral residue, especially with pills. Patient continues to benefit from skilled intervention to address the above impairments. Continue treatment per established plan of care. Discharge Recommendations:  None     SUBJECTIVE:   Patient stated I don't like those big pills. -RN crushed all pills. OBJECTIVE:   Cognitive and Communication Status:  Neurologic State: Alert  Orientation Level: Oriented to person, Oriented to place  Cognition: Follows commands  Perception: Appears intact  Perseveration: No perseveration noted  Safety/Judgement: Decreased insight into deficits  Dysphagia Treatment:  Oral Assessment:     P.O. Trials:  Patient Position: upright in bed  Vocal quality prior to P.O.: No impairment  Consistency Presented: Thin liquid;Pill/Tablet;Puree  How Presented: Self-fed/presented;SLP-fed/presented;Spoon;Straw;Successive swallows   ORAL PHASE:   Bolus Acceptance: No impairment  Bolus Formation/Control: Impaired (mild L oral residue on tongue, lateral sulcus and mild L oral leakage with reduced awareness.  yellow stain on L tongue only from crushed iron pill)  Type of Impairment: Anterior;Posterior  Propulsion: No impairment  Oral Residue: Less than 10% of bolus (eventually cleared most of it, but flecks)  PHARYNGEAL PHASE:   Initiation of Swallow: No impairment  Laryngeal Elevation: Functional  Aspiration Signs/Symptoms: None  Pharyngeal Phase Characteristics: No impairment, issues, or problems                    Exercises:  Laryngeal Exercises:               SPEECH:   clear                                                                                                                    Pain:             After treatment:   Patient left in no apparent distress in bed and Nursing notified    COMMUNICATION/EDUCATION:   Patient was educated regarding her deficit(s) of dysphagia as this relates to her diagnosis of UTI. She demonstrated Good understanding as evidenced by discussion. .    The patient's plan of care including recommendations, planned interventions, and recommended diet changes were discussed with: Registered nurse.      Nash Mejia, SLP  Time Calculation: 10 mins

## 2022-09-13 NOTE — PROGRESS NOTES
Problem: Self Care Deficits Care Plan (Adult)  Goal: *Acute Goals and Plan of Care (Insert Text)  Description: FUNCTIONAL STATUS PRIOR TO ADMISSION: per patient and her  via (phone call) patient has had a decline in her mobility over the last ~6 months, does not use adaptive equipment, however per her  she takes \"little steps\" when walking and he stated \"has a hard time walking crisply\" Patient's  reports episodes of legs not functioning at night and inability to move them and as day progresses she can move them again. Both report decreased balance over the last mew months. Patient was active in past and a . HOME SUPPORT: The patient lived with her  but did not require assist. However recent increase in falls and decreased balance    Occupational Therapy Goals  Initiated 9/10/2022  1. Patient will perform grooming with supervision/set-up seated unsupported within 7 day(s). 2.  Patient will maintain static sitting balance with UE support at midline with min multi-modal cues > or = 1 minute within 7 day(s). 3.  Patient will maintain static standing balance with bilateral UE support > or = 1 minute with moderate assist of 1 and min cues within 7 day(s)  4. Patient will perform squat pivot transfer to bedside commode with moderate assist of 1 within 7 day(s). 5.  Patient will position LE's in bridge and elevate hips with verbal cue x's 3 reps to assist with repositioning and toileting in bed within 7 day(s). 6.  Patient will perform cervical ROM and bilateral UE AROM with min cues within 7 day(s). Outcome: Progressing Towards Goal   OCCUPATIONAL THERAPY TREATMENT  Patient: Jacek Andino (26 y.o. female)  Date: 9/13/2022  Diagnosis: UTI (urinary tract infection) [N39.0] <principal problem not specified>      Precautions: Fall, Skin  Chart, occupational therapy assessment, plan of care, and goals were reviewed.     ASSESSMENT  Patient continues with skilled OT services and is progressing towards goals. Patient received in bed, agreeable to activity. She shows improvement today with transfers. Patient transfer to commode at [facility] assist but improved with transfer to chair at min assist x1. She requires mod assist to change/replace protective brief. Patient responds well to verbal cues for technique with activity. Patient left in chair at end of session. Current Level of Function Impacting Discharge (ADLs): mod/min assist     Other factors to consider for discharge:          PLAN :  Patient continues to benefit from skilled intervention to address the above impairments. Continue treatment per established plan of care to address goals. Recommend with staff: commode transfers, ADLs as able    Recommend next OT session: progression of goals     Recommendation for discharge: (in order for the patient to meet his/her long term goals)  Therapy 3 hours per day 5-7 days per week    This discharge recommendation:  Has been made in collaboration with the attending provider and/or case management    IF patient discharges home will need the following DME: TBD       SUBJECTIVE:   Patient stated I love the [place].     OBJECTIVE DATA SUMMARY:   Cognitive/Behavioral Status:  Neurologic State: Alert  Orientation Level: Oriented X4  Cognition: Follows commands  Perception: Appears intact  Perseveration: No perseveration noted  Safety/Judgement: Awareness of environment    Functional Mobility and Transfers for ADLs:  Bed Mobility:  Scooting: Stand-by assistance    Transfers:  Sit to Stand: Minimum assistance;Assist x2  Functional Transfers  Bathroom Mobility: Minimum assistance; Moderate assistance  Toilet Transfer : Minimum assistance  Cues: Physical assistance;Verbal cues provided       Balance:  Sitting: Intact; With support  Standing: Impaired; With support  Standing - Static: Fair;Constant support  Standing - Dynamic : Poor;Constant support    ADL Intervention: Type of Bath: Chlorhexidine (CHG)              Lower Body Dressing Assistance  Protective Undergarmet: Moderate assistance         Cognitive Retraining  Safety/Judgement: Awareness of environment    Therapeutic Exercises:       Pain:      Activity Tolerance:   Good and requires rest breaks    After treatment patient left in no apparent distress:   Sitting in chair, Call bell within reach, and Bed / chair alarm activated    COMMUNICATION/COLLABORATION:   The patients plan of care was discussed with: Physical therapist and Registered nurse.      Veronica Ortiz OTR/L  Time Calculation: 25 mins

## 2022-09-13 NOTE — PROGRESS NOTES
Problem: Mobility Impaired (Adult and Pediatric)  Goal: *Acute Goals and Plan of Care (Insert Text)  Description: FUNCTIONAL STATUS PRIOR TO ADMISSION: Pt reports impaired balance and several falls in recent months. She reports ambulating without assistive device and frequently feels as though her Calleen Junes is off\" but is unable to offer further description. Some pt reports are inconsistent over course of encounter, including duration of R proximal leg pain. Per OT following OT conversation with spouse pt has experienced limited mobility x 2-3 years with increased decline over past 6 months. She ambulates with shuffling gait and no assistive device. She reportedly experiences episodes of limited gross motor abilities and inability to ambulate with increased difficulty in mornings vs afternoons. HOME SUPPORT PRIOR TO ADMISSION: The patient lived with spouse who reports concern regarding ability to care for patient at home with current impairments. Physical Therapy Goals  Initiated 9/10/2022  1. Patient will move from supine to sit and sit to supine  in bed with moderate assistance  within 7 day(s). 2.  Patient will transfer from bed to chair and chair to bed with moderate assistance  using the least restrictive device within 7 day(s). 3.  Patient will perform sit to stand with moderate assistance 3/3 trials within 7 day(s). 4.  Patient will ambulate with moderate assistance  for 10 feet with the least restrictive device within 7 day(s). 5.  Patient will perform static and dynamic sitting activities x 10 mins with supervision within 7 days. Outcome: Progressing Towards Goal   PHYSICAL THERAPY TREATMENT  Patient: Nadia Palmer (25 y.o. female)  Date: 9/13/2022  Diagnosis: UTI (urinary tract infection) [N39.0] <principal problem not specified>      Precautions: Fall, Skin  Chart, physical therapy assessment, plan of care and goals were reviewed.     ASSESSMENT  Patient continues with skilled PT services and is progressing towards goals. Patient this afternoon with great improvement in physical therapy treatment emphasizing gait training and functional activity in the restroom. She performs sit> stand transfer with up to minAx1 and RW which is much improved from last session. Patient tolerates ambulation to the restroom with modAx1 with assistance to prevent posterior lean as well as lateral weight shift for step progression. Following the restroom, patient gait improved to minAx1 with reduced posterior lean. Bilateral step length very shortened with cuing required to lengthen. Continue to recommend IPR upon dc. Current Level of Function Impacting Discharge (mobility/balance): up to minAx1 with RW for ambulation    Other factors to consider for discharge: none additional         PLAN :  Patient continues to benefit from skilled intervention to address the above impairments. Continue treatment per established plan of care. to address goals. Recommendation for discharge: (in order for the patient to meet his/her long term goals)  Therapy 3 hours per day 5-7 days per week    This discharge recommendation:  Has been made in collaboration with the attending provider and/or case management    IF patient discharges home will need the following DME: to be determined (TBD)       SUBJECTIVE:   Patient stated oh good.  re: telling patient that she is performing much better with PT    OBJECTIVE DATA SUMMARY:   Patient received seated in bedside chair, agreeable to participate in PT session. Patient was cleared by nursing to participate in PT session.      Critical Behavior:  Neurologic State: Alert  Orientation Level: Oriented to person, Oriented to place  Cognition: Follows commands  Safety/Judgement: Decreased insight into deficits  Functional Mobility Training:  Bed Mobility:           Scooting: Stand-by assistance        Transfers:  Sit to Stand: Minimum assistance;Assist x2  Stand to Sit: Minimum assistance Balance:  Sitting: Intact; With support  Standing: Impaired; With support  Standing - Static: Fair;Constant support  Standing - Dynamic : Poor;Constant support  Ambulation/Gait Training:  Distance (ft): 15 Feet (ft) (x2)  Assistive Device: Gait belt;Walker, rolling  Ambulation - Level of Assistance: Moderate assistance;Assist x1 (up to minAx1)        Gait Abnormalities: Decreased step clearance; Step to gait              Speed/Valerie: Pace decreased (<100 feet/min); Shuffled            Therapeutic Exercises:     Pain Rating:  Patient without reports of pain during therapy      Activity Tolerance:   Good and tolerates ADLs without rest breaks    After treatment patient left in no apparent distress:   Sitting in chair, Call bell within reach, and Bed / chair alarm activated    COMMUNICATION/COLLABORATION:   The patients plan of care was discussed with: Occupational therapist, Registered nurse, and Case management.      Jc Barrett PT, DPT   Time Calculation: 25 mins

## 2022-09-13 NOTE — PROGRESS NOTES
Problem: Pressure Injury - Risk of  Goal: *Prevention of pressure injury  Description: Document Asad Scale and appropriate interventions in the flowsheet.   Outcome: Progressing Towards Goal  Note: Pressure Injury Interventions:  Sensory Interventions: Assess changes in LOC    Moisture Interventions: Absorbent underpads    Activity Interventions: Increase time out of bed    Mobility Interventions: Float heels    Nutrition Interventions: Document food/fluid/supplement intake    Friction and Shear Interventions: Minimize layers

## 2022-09-13 NOTE — PROGRESS NOTES
[facility]  [address]  [phone]      Hospitalist Progress Note      NAME: Brian Kinney   :  1945  MRM:  [id number]    Date/Time of service: 2022  12:46 PM       Subjective:     Chief Complaint:  Patient was personally seen and examined by me during this time period. Chart reviewed. Working with PT/OT. Doing well        Objective:       Vitals:       Last 24hrs VS reviewed since prior progress note.  Most recent are:    Visit Vitals  /74 (BP 1 Location: Left upper arm, BP Patient Position: Sitting)   Pulse 92   Temp 97.9 °F (36.6 °C)   Resp 18   Ht 5' 4\" (1.626 m)   Wt 56.8 kg (125 lb 4.8 oz)   SpO2 92%   BMI 21.51 kg/m²     SpO2 Readings from Last 6 Encounters:   22 92%   17 97%   07/06/15 95%          Intake/Output Summary (Last 24 hours) at 2022 1246  Last data filed at 2022 1738  Gross per 24 hour   Intake 140 ml   Output --   Net 140 ml          Exam:     Physical Exam:    Gen:  frail, ill-appearing, NAD  HEENT:  Pink conjunctivae, PERRL, hearing intact to voice, moist mucous membranes  Neck:  Supple, without masses, thyroid non-tender  Resp:  No accessory muscle use, clear breath sounds without wheezes rales or rhonchi  Card:  No murmurs, normal S1, S2 without thrills, bruits or peripheral edema  Abd:  Soft, non-tender, non-distended, normoactive bowel sounds are present  Musc:  No cyanosis or clubbing  Skin:  No rashes  Neuro:  Cranial nerves 3-12 are grossly intact, generalized weakness, follows commands appropriately  Psych:  Good insight, oriented to person, place and time, alert    Medications Reviewed: (see below)    Lab Data Reviewed: (see below)    ______________________________________________________________________    Medications:     Current Facility-Administered Medications   Medication Dose Route Frequency    thiamine mononitrate (B-1) tablet 100 mg  100 mg Oral DAILY    miconazole (MICOTIN) 2 % powder   Topical BID    therapeutic multivitamin (THERAGRAN) tablet 1 Tablet  1 Tablet Oral DAILY    folic acid (FOLVITE) tablet 1 mg  1 mg Oral DAILY    phosphorus (K PHOS NEUTRAL) 250 mg tablet 2 Tablet  2 Tablet Oral BID    enoxaparin (LOVENOX) injection 40 mg  40 mg SubCUTAneous Q24H    aspirin chewable tablet 81 mg  81 mg Oral DAILY    ciprofloxacin HCl (CIPRO) tablet 500 mg  500 mg Oral Q12H    lisinopriL (PRINIVIL, ZESTRIL) tablet 20 mg  20 mg Oral DAILY    loperamide (IMODIUM) capsule 2 mg  2 mg Oral Q4H PRN    sodium chloride (NS) flush 5-10 mL  5-10 mL IntraVENous PRN    ALPRAZolam (XANAX) tablet 0.5 mg  0.5 mg Oral QHS PRN    amLODIPine (NORVASC) tablet 5 mg  5 mg Oral DAILY    pantoprazole (PROTONIX) tablet 40 mg  40 mg Oral ACB    sertraline (ZOLOFT) tablet 100 mg  100 mg Oral DAILY    sodium chloride (NS) flush 5-40 mL  5-40 mL IntraVENous Q8H    sodium chloride (NS) flush 5-40 mL  5-40 mL IntraVENous PRN    acetaminophen (TYLENOL) tablet 650 mg  650 mg Oral Q6H PRN    Or    acetaminophen (TYLENOL) suppository 650 mg  650 mg Rectal Q6H PRN    polyethylene glycol (MIRALAX) packet 17 g  17 g Oral DAILY PRN    ondansetron (ZOFRAN ODT) tablet 4 mg  4 mg Oral Q8H PRN    Or    ondansetron (ZOFRAN) injection 4 mg  4 mg IntraVENous Q6H PRN          Lab Review:     Recent Labs     09/12/22  0407   WBC 7.6   HGB 10.8*   HCT 31.7*          Recent Labs     09/12/22  0407 09/10/22  1640    137   K 3.8 3.1*    104   CO2 26 25   GLU 97 134*   BUN 12 16   CREA 0.70 0.74   CA 8.3* 8.4*   MG 2.3 2.0   PHOS 3.5  --        No results found for: GLUCPOC       Assessment / Plan:     66 yo hx of HTN, depression, etoh abuse, presented w/ sepsis, ataxia, UTI, E. Coli bacteremia    1) Sepsis/UTI/E. Coli bacteremia: sepsis POA, now resolved. ID recommended oral Cipro till 09/17    2) Weakness/ataxia: likely from chronic etoh abuse. Head MRI was neg for CVA. Neuro was following. Cont PT/OT. 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   Cont ASA per neuro     4) HTN: cont norvasc, lisinopril     5) Depression: cont zoloft, xanax     6) Etoh abuse: no withdrawal.  Cont vitamins    Total time spent with patient care: 25 Minutes **I personally saw and examined the patient during this time period**                 Care Plan discussed with: Patient, nursing     Discussed:  care plan    Prophylaxis:  Lovenox    Disposition:  [facility]/Rehab           ___________________________________________________    Attending Physician: Randell Cunha MD

## 2022-09-13 NOTE — PROGRESS NOTES
Bedside shift change report given to Nirmala Price RN  (oncoming nurse) by Tomeka James (offgoing nurse). Report included the following information SBAR, Kardex, MAR, and Recent Results.

## 2022-09-13 NOTE — PROGRESS NOTES
Transition of Care Plan - RUR 9%:  Medical management continues  PT/OT following - recommending IPR  CM following - patient is OON with [facility]; [address] and [facility] did not accept patient.   Patient has been accepted by the [facility] and insurance auth is pending  Transport TBD - family vs w/c [facility]  Outpatient follow-up    Jasiel Olivares LCSW

## 2022-09-14 PROCEDURE — 74011250637 HC RX REV CODE- 250/637: Performed by: INTERNAL MEDICINE

## 2022-09-14 PROCEDURE — 74011250637 HC RX REV CODE- 250/637: Performed by: HOSPITALIST

## 2022-09-14 PROCEDURE — 74011000250 HC RX REV CODE- 250: Performed by: INTERNAL MEDICINE

## 2022-09-14 PROCEDURE — 74011250637 HC RX REV CODE- 250/637: Performed by: PSYCHIATRY & NEUROLOGY

## 2022-09-14 PROCEDURE — 65270000029 HC RM PRIVATE

## 2022-09-14 PROCEDURE — 74011250636 HC RX REV CODE- 250/636: Performed by: HOSPITALIST

## 2022-09-14 RX ADMIN — CIPROFLOXACIN 500 MG: 500 TABLET, FILM COATED ORAL at 08:45

## 2022-09-14 RX ADMIN — PANTOPRAZOLE SODIUM 40 MG: 40 TABLET, DELAYED RELEASE ORAL at 06:54

## 2022-09-14 RX ADMIN — MICONAZOLE NITRATE 2 % TOPICAL POWDER: at 08:46

## 2022-09-14 RX ADMIN — ASPIRIN 81 MG: 81 TABLET, CHEWABLE ORAL at 08:45

## 2022-09-14 RX ADMIN — DIBASIC SODIUM PHOSPHATE, MONOBASIC POTASSIUM PHOSPHATE AND MONOBASIC SODIUM PHOSPHATE 2 TABLET: 852; 155; 130 TABLET ORAL at 08:45

## 2022-09-14 RX ADMIN — Medication 10 ML: at 14:00

## 2022-09-14 RX ADMIN — Medication 10 ML: at 23:51

## 2022-09-14 RX ADMIN — LISINOPRIL 20 MG: 20 TABLET ORAL at 08:45

## 2022-09-14 RX ADMIN — Medication 10 ML: at 06:54

## 2022-09-14 RX ADMIN — ENOXAPARIN SODIUM 40 MG: 100 INJECTION SUBCUTANEOUS at 08:45

## 2022-09-14 RX ADMIN — FOLIC ACID 1 MG: 1 TABLET ORAL at 08:45

## 2022-09-14 RX ADMIN — MICONAZOLE NITRATE 2 % TOPICAL POWDER: at 18:00

## 2022-09-14 RX ADMIN — THIAMINE HCL TAB 100 MG 100 MG: 100 TAB at 08:46

## 2022-09-14 RX ADMIN — THERA TABS 1 TABLET: TAB at 08:46

## 2022-09-14 RX ADMIN — CIPROFLOXACIN 500 MG: 500 TABLET, FILM COATED ORAL at 23:51

## 2022-09-14 RX ADMIN — SERTRALINE HYDROCHLORIDE 100 MG: 50 TABLET ORAL at 08:45

## 2022-09-14 RX ADMIN — AMLODIPINE BESYLATE 5 MG: 5 TABLET ORAL at 08:45

## 2022-09-14 NOTE — PROGRESS NOTES
Andi Antony [facility] 79  [address] [facility], [address] [place] [address]  [phone]      Hospitalist Progress Note      NAME: Halle Junior   :  1945  MRM:  [id number]    Date/Time of service: 2022  11:58 AM       Subjective:     Chief Complaint:  Patient was personally seen and examined by me during this time period. Chart reviewed. Weakness is improving. No fevers       Objective:       Vitals:       Last 24hrs VS reviewed since prior progress note.  Most recent are:    Visit Vitals  BP (!) 155/73 (BP 1 Location: Left upper arm, BP Patient Position: At rest)   Pulse 74   Temp 97.7 °F (36.5 °C)   Resp 18   Ht 5' 4\" (1.626 m)   Wt 56.8 kg (125 lb 4.8 oz)   SpO2 96%   BMI 21.51 kg/m²     SpO2 Readings from Last 6 Encounters:   22 96%   17 97%   07/06/15 95%          Intake/Output Summary (Last 24 hours) at 2022 1158  Last data filed at 2022 1232  Gross per 24 hour   Intake 240 ml   Output --   Net 240 ml          Exam:     Physical Exam:    Gen:  frail, ill-appearing, NAD  HEENT:  Pink conjunctivae, PERRL, hearing intact to voice, moist mucous membranes  Neck:  Supple, without masses, thyroid non-tender  Resp:  No accessory muscle use, clear breath sounds without wheezes rales or rhonchi  Card:  No murmurs, normal S1, S2 without thrills, bruits or peripheral edema  Abd:  Soft, non-tender, non-distended, normoactive bowel sounds are present  Musc:  No cyanosis or clubbing  Skin:  No rashes  Neuro:  Cranial nerves 3-12 are grossly intact, generalized weakness, follows commands appropriately  Psych:  Good insight, oriented to person, place and time, alert    Medications Reviewed: (see below)    Lab Data Reviewed: (see below)    ______________________________________________________________________    Medications:     Current Facility-Administered Medications   Medication Dose Route Frequency    thiamine mononitrate (B-1) tablet 100 mg  100 mg Oral DAILY    miconazole (MICOTIN) 2 % powder   Topical BID    therapeutic multivitamin (THERAGRAN) tablet 1 Tablet  1 Tablet Oral DAILY    folic acid (FOLVITE) tablet 1 mg  1 mg Oral DAILY    enoxaparin (LOVENOX) injection 40 mg  40 mg SubCUTAneous Q24H    aspirin chewable tablet 81 mg  81 mg Oral DAILY    ciprofloxacin HCl (CIPRO) tablet 500 mg  500 mg Oral Q12H    lisinopriL (PRINIVIL, ZESTRIL) tablet 20 mg  20 mg Oral DAILY    loperamide (IMODIUM) capsule 2 mg  2 mg Oral Q4H PRN    sodium chloride (NS) flush 5-10 mL  5-10 mL IntraVENous PRN    ALPRAZolam (XANAX) tablet 0.5 mg  0.5 mg Oral QHS PRN    amLODIPine (NORVASC) tablet 5 mg  5 mg Oral DAILY    pantoprazole (PROTONIX) tablet 40 mg  40 mg Oral ACB    sertraline (ZOLOFT) tablet 100 mg  100 mg Oral DAILY    sodium chloride (NS) flush 5-40 mL  5-40 mL IntraVENous Q8H    sodium chloride (NS) flush 5-40 mL  5-40 mL IntraVENous PRN    acetaminophen (TYLENOL) tablet 650 mg  650 mg Oral Q6H PRN    Or    acetaminophen (TYLENOL) suppository 650 mg  650 mg Rectal Q6H PRN    polyethylene glycol (MIRALAX) packet 17 g  17 g Oral DAILY PRN    ondansetron (ZOFRAN ODT) tablet 4 mg  4 mg Oral Q8H PRN    Or    ondansetron (ZOFRAN) injection 4 mg  4 mg IntraVENous Q6H PRN          Lab Review:     Recent Labs     09/12/22  0407   WBC 7.6   HGB 10.8*   HCT 31.7*          Recent Labs     09/12/22  0407      K 3.8      CO2 26   GLU 97   BUN 12   CREA 0.70   CA 8.3*   MG 2.3   PHOS 3.5       No results found for: GLUCPOC       Assessment / Plan:     66 yo hx of HTN, depression, etoh abuse, presented w/ sepsis, ataxia, UTI, E. Coli bacteremia    1) Sepsis/UTI/E. Coli bacteremia: sepsis POA, now resolved. ID recommended oral Cipro till 09/17    2) Weakness/ataxia: likely from chronic etoh abuse. Head MRI was neg for CVA. Neuro was following. Cont PT/OT. 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   Cont ASA per neuro 4) HTN: cont norvasc, lisinopril     5) Depression: cont zoloft, xanax     6) Etoh abuse: no withdrawal.  Cont vitamins    Total time spent with patient care: 20 Minutes **I personally saw and examined the patient during this time period**                 Care Plan discussed with: Patient, nursing, CM     Discussed:  care plan    Prophylaxis:  Lovenox    Disposition:  [facility]/Rehab           ___________________________________________________    Attending Physician: Sharon Sandhu MD

## 2022-09-14 NOTE — PROGRESS NOTES
Bedside shift change report given to Delonte Epps (oncoming nurse) by Daryl Bynum (offgoing nurse). Report included the following information SBAR, Intake/Output, MAR, Recent Results, and Med Rec Status.

## 2022-09-14 NOTE — PROGRESS NOTES
Problem: Pressure Injury - Risk of  Goal: *Prevention of pressure injury  Description: Document Asad Scale and appropriate interventions in the flowsheet. Outcome: Progressing Towards Goal  Note: Pressure Injury Interventions:  Sensory Interventions: Assess changes in LOC    Moisture Interventions: Absorbent underpads    Activity Interventions: Increase time out of bed    Mobility Interventions: Float heels    Nutrition Interventions: Document food/fluid/supplement intake    Friction and Shear Interventions: Minimize layers                Problem: Patient Education: Go to Patient Education Activity  Goal: Patient/Family Education  Outcome: Progressing Towards Goal     Problem: Falls - Risk of  Goal: *Absence of Falls  Description: Document Michael Fall Risk and appropriate interventions in the flowsheet.   Outcome: Progressing Towards Goal  Note: Fall Risk Interventions:  Mobility Interventions: Bed/chair exit alarm    Mentation Interventions: Bed/chair exit alarm    Medication Interventions: Bed/chair exit alarm    Elimination Interventions: Call light in reach    History of Falls Interventions: Bed/chair exit alarm         Problem: Patient Education: Go to Patient Education Activity  Goal: Patient/Family Education  Outcome: Progressing Towards Goal     Problem: Pain  Goal: *Control of Pain  Outcome: Progressing Towards Goal     Problem: Fluid Volume - Risk of, Imbalanced  Goal: *Balanced intake and output  Outcome: Progressing Towards Goal     Problem: Patient Education: Go to Patient Education Activity  Goal: Patient/Family Education  Outcome: Progressing Towards Goal     Problem: Patient Education: Go to Patient Education Activity  Goal: Patient/Family Education  Outcome: Progressing Towards Goal     Problem: Patient Education: Go to Patient Education Activity  Goal: Patient/Family Education  Outcome: Progressing Towards Goal     Problem: Nutrition Deficit  Goal: *Optimize nutritional status  Outcome: Progressing Towards Goal

## 2022-09-14 NOTE — PROGRESS NOTES
4:28 pm:  Auth still pending. Patient and family updated. Transition of Care Plan - RUR 9%:  Medical management continues  2. PT/OT following   3. CM following - insurance denied IPR- plan is for SNF. Authorization pending with [facility]  4. Wheelchair van transport at discharge  5.    Outpatient follow-up    Linda Higginbotham LCSW

## 2022-09-15 PROCEDURE — 97535 SELF CARE MNGMENT TRAINING: CPT

## 2022-09-15 PROCEDURE — 74011250637 HC RX REV CODE- 250/637: Performed by: HOSPITALIST

## 2022-09-15 PROCEDURE — 97116 GAIT TRAINING THERAPY: CPT

## 2022-09-15 PROCEDURE — 74011250637 HC RX REV CODE- 250/637: Performed by: PSYCHIATRY & NEUROLOGY

## 2022-09-15 PROCEDURE — 74011250637 HC RX REV CODE- 250/637: Performed by: INTERNAL MEDICINE

## 2022-09-15 PROCEDURE — 65270000029 HC RM PRIVATE

## 2022-09-15 PROCEDURE — 74011250636 HC RX REV CODE- 250/636: Performed by: HOSPITALIST

## 2022-09-15 PROCEDURE — 74011000250 HC RX REV CODE- 250: Performed by: INTERNAL MEDICINE

## 2022-09-15 RX ADMIN — MICONAZOLE NITRATE 2 % TOPICAL POWDER: at 18:00

## 2022-09-15 RX ADMIN — Medication 10 ML: at 21:46

## 2022-09-15 RX ADMIN — CIPROFLOXACIN 500 MG: 500 TABLET, FILM COATED ORAL at 08:49

## 2022-09-15 RX ADMIN — PANTOPRAZOLE SODIUM 40 MG: 40 TABLET, DELAYED RELEASE ORAL at 07:26

## 2022-09-15 RX ADMIN — MICONAZOLE NITRATE 2 % TOPICAL POWDER: at 08:50

## 2022-09-15 RX ADMIN — THIAMINE HCL TAB 100 MG 100 MG: 100 TAB at 08:49

## 2022-09-15 RX ADMIN — SERTRALINE HYDROCHLORIDE 100 MG: 50 TABLET ORAL at 08:49

## 2022-09-15 RX ADMIN — THERA TABS 1 TABLET: TAB at 08:49

## 2022-09-15 RX ADMIN — CIPROFLOXACIN 500 MG: 500 TABLET, FILM COATED ORAL at 21:46

## 2022-09-15 RX ADMIN — AMLODIPINE BESYLATE 5 MG: 5 TABLET ORAL at 08:49

## 2022-09-15 RX ADMIN — Medication 10 ML: at 14:00

## 2022-09-15 RX ADMIN — Medication 10 ML: at 07:26

## 2022-09-15 RX ADMIN — ASPIRIN 81 MG: 81 TABLET, CHEWABLE ORAL at 09:00

## 2022-09-15 RX ADMIN — ALPRAZOLAM 0.5 MG: 0.5 TABLET ORAL at 21:46

## 2022-09-15 RX ADMIN — FOLIC ACID 1 MG: 1 TABLET ORAL at 08:49

## 2022-09-15 RX ADMIN — ENOXAPARIN SODIUM 40 MG: 100 INJECTION SUBCUTANEOUS at 08:49

## 2022-09-15 RX ADMIN — LISINOPRIL 20 MG: 20 TABLET ORAL at 08:49

## 2022-09-15 NOTE — PROGRESS NOTES
Andi Antony [facility] [address]  [address]  [phone]      Hospitalist Progress Note      NAME: Julio Sen   :  1945  MRM:  [id number]    Date/Time of service: 9/15/2022  10:06 AM       Subjective:     Chief Complaint:  Patient was personally seen and examined by me during this time period. Chart reviewed. Working with PT. No chest pain, SOB       Objective:       Vitals:       Last 24hrs VS reviewed since prior progress note.  Most recent are:    Visit Vitals  BP (!) 143/73 (BP 1 Location: Right arm, BP Patient Position: At rest)   Pulse 71   Temp 98.4 °F (36.9 °C)   Resp 16   Ht 5' 4\" (1.626 m)   Wt 56.8 kg (125 lb 4.8 oz)   SpO2 96%   BMI 21.51 kg/m²     SpO2 Readings from Last 6 Encounters:   09/15/22 96%   17 97%   07/06/15 95%        No intake or output data in the 24 hours ending 09/15/22 1006       Exam:     Physical Exam:    Gen:  frail, ill-appearing, NAD  HEENT:  Pink conjunctivae, PERRL, hearing intact to voice, moist mucous membranes  Neck:  Supple, without masses, thyroid non-tender  Resp:  No accessory muscle use, clear breath sounds without wheezes rales or rhonchi  Card:  No murmurs, normal S1, S2 without thrills, bruits or peripheral edema  Abd:  Soft, non-tender, non-distended, normoactive bowel sounds are present  Musc:  No cyanosis or clubbing  Skin:  No rashes  Neuro:  Cranial nerves 3-12 are grossly intact, generalized weakness, follows commands appropriately  Psych:  Good insight, oriented to person, place and time, alert    Medications Reviewed: (see below)    Lab Data Reviewed: (see below)    ______________________________________________________________________    Medications:     Current Facility-Administered Medications   Medication Dose Route Frequency    thiamine mononitrate (B-1) tablet 100 mg  100 mg Oral DAILY    miconazole (MICOTIN) 2 % powder   Topical BID    therapeutic multivitamin (THERAGRAN) tablet 1 Tablet  1 Tablet Oral DAILY    folic acid (FOLVITE) tablet 1 mg  1 mg Oral DAILY    enoxaparin (LOVENOX) injection 40 mg  40 mg SubCUTAneous Q24H    aspirin chewable tablet 81 mg  81 mg Oral DAILY    ciprofloxacin HCl (CIPRO) tablet 500 mg  500 mg Oral Q12H    lisinopriL (PRINIVIL, ZESTRIL) tablet 20 mg  20 mg Oral DAILY    loperamide (IMODIUM) capsule 2 mg  2 mg Oral Q4H PRN    sodium chloride (NS) flush 5-10 mL  5-10 mL IntraVENous PRN    ALPRAZolam (XANAX) tablet 0.5 mg  0.5 mg Oral QHS PRN    amLODIPine (NORVASC) tablet 5 mg  5 mg Oral DAILY    pantoprazole (PROTONIX) tablet 40 mg  40 mg Oral ACB    sertraline (ZOLOFT) tablet 100 mg  100 mg Oral DAILY    sodium chloride (NS) flush 5-40 mL  5-40 mL IntraVENous Q8H    sodium chloride (NS) flush 5-40 mL  5-40 mL IntraVENous PRN    acetaminophen (TYLENOL) tablet 650 mg  650 mg Oral Q6H PRN    Or    acetaminophen (TYLENOL) suppository 650 mg  650 mg Rectal Q6H PRN    polyethylene glycol (MIRALAX) packet 17 g  17 g Oral DAILY PRN    ondansetron (ZOFRAN ODT) tablet 4 mg  4 mg Oral Q8H PRN    Or    ondansetron (ZOFRAN) injection 4 mg  4 mg IntraVENous Q6H PRN          Lab Review:     No results for input(s): WBC, HGB, HCT, PLT, HGBEXT, HCTEXT, PLTEXT, HGBEXT, HCTEXT, PLTEXT in the last 72 hours. No results for input(s): NA, K, CL, CO2, GLU, BUN, CREA, CA, MG, PHOS, ALB, TBIL, TBILI, ALT, INR, INREXT, INREXT in the last 72 hours. No lab exists for component: SGOT    No results found for: GLUCPOC       Assessment / Plan:     64 yo hx of HTN, depression, etoh abuse, presented w/ sepsis, ataxia, UTI, E. Coli bacteremia    1) Sepsis/UTI/E. Coli bacteremia: sepsis POA, now resolved. ID recommended oral Cipro till 09/17    2) Weakness/ataxia: slowly improving. Likely from chronic etoh abuse. Head MRI was neg for CVA. Neuro was following. Cont PT/OT. 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   Cont ASA per neuro 4) HTN: cont norvasc, lisinopril     5) Depression: cont zoloft, xanax     6) Etoh abuse: no withdrawal.  Cont vitamins    Total time spent with patient care: 20 Minutes **I personally saw and examined the patient during this time period**                 Care Plan discussed with: Patient, nursing, CM, PT/OT    Discussed:  care plan    Prophylaxis:  Lovenox    Disposition:  [facility]/Rehab           ___________________________________________________    Attending Physician: Anthony Ferreira MD

## 2022-09-15 NOTE — PROGRESS NOTES
Problem: Mobility Impaired (Adult and Pediatric)  Goal: *Acute Goals and Plan of Care (Insert Text)  Description: FUNCTIONAL STATUS PRIOR TO ADMISSION: Pt reports impaired balance and several falls in recent months. She reports ambulating without assistive device and frequently feels as though her Calleen Junes is off\" but is unable to offer further description. Some pt reports are inconsistent over course of encounter, including duration of R proximal leg pain. Per OT following OT conversation with spouse pt has experienced limited mobility x 2-3 years with increased decline over past 6 months. She ambulates with shuffling gait and no assistive device. She reportedly experiences episodes of limited gross motor abilities and inability to ambulate with increased difficulty in mornings vs afternoons. HOME SUPPORT PRIOR TO ADMISSION: The patient lived with spouse who reports concern regarding ability to care for patient at home with current impairments. Physical Therapy Goals  Initiated 9/10/2022  1. Patient will move from supine to sit and sit to supine  in bed with moderate assistance  within 7 day(s). 2.  Patient will transfer from bed to chair and chair to bed with moderate assistance  using the least restrictive device within 7 day(s). 3.  Patient will perform sit to stand with moderate assistance 3/3 trials within 7 day(s). 4.  Patient will ambulate with moderate assistance  for 10 feet with the least restrictive device within 7 day(s). 5.  Patient will perform static and dynamic sitting activities x 10 mins with supervision within 7 days. Outcome: Progressing Towards Goal   PHYSICAL THERAPY TREATMENT  Patient: Nadia Palmer (49 y.o. female)  Date: 9/15/2022  Diagnosis: UTI (urinary tract infection) [N39.0] <principal problem not specified>      Precautions: Fall, Skin  Chart, physical therapy assessment, plan of care and goals were reviewed.     ASSESSMENT  Patient continues with skilled PT services and is progressing towards goals. Patient this morning with good participation and tolerance to physical therapy session emphasizing gait training. Patient with continued improvements in ataxic gait which is minimal today with RW use. She tolerates 75 ft in hallways with CGA and RW. For second half of ambulation, patient with increased fatigue, shortened step length and increased instability secondary to fatigue. Patient verbalizes high independence goals to include going on girls trips via the train up and down the [facility]. Continue to recommend rehab upon d/c. Current Level of Function Impacting Discharge (mobility/balance): CGA with RW    Other factors to consider for discharge: IPR denied per CM note         PLAN :  Patient continues to benefit from skilled intervention to address the above impairments. Continue treatment per established plan of care. to address goals. Recommendation for discharge: (in order for the patient to meet his/her long term goals)  Therapy up to 5 days/week in rehab setting    This discharge recommendation:  Has been made in collaboration with the attending provider and/or case management    IF patient discharges home will need the following DME: to be determined (TBD)       SUBJECTIVE:   Patient stated I would like to.  re: return to going on train trips with friends on the [facility]:   Patient received supine in bed and was agreeable to participate in PT session. Critical Behavior:  Neurologic State: Alert  Orientation Level: Oriented to time  Cognition: Follows commands  Safety/Judgement: Awareness of environment  Functional Mobility Training:  Bed Mobility:     Supine to Sit: Stand-by assistance; Adaptive equipment     Scooting: Stand-by assistance        Transfers:  Sit to Stand: Contact guard assistance  Stand to Sit: Contact guard assistance                             Balance:  Sitting: Intact  Standing: Impaired; With support  Standing - Static: Good  Standing - Dynamic : Fair  Ambulation/Gait Training:  Distance (ft): 75 Feet (ft)  Assistive Device: Gait belt;Walker, rolling  Ambulation - Level of Assistance: Contact guard assistance;Assist x1;Additional time        Gait Abnormalities: Decreased step clearance; Step to gait; Path deviations              Speed/Valerie: Pace decreased (<100 feet/min)                   Therapeutic Exercises:     Pain Rating:  Patient without reports of pain during therapy      Activity Tolerance:   Fair and requires rest breaks    After treatment patient left in no apparent distress:   Sitting in chair, Call bell within reach, and Bed / chair alarm activated    COMMUNICATION/COLLABORATION:   The patients plan of care was discussed with: Occupational therapist.   Carmine Melgoza PT, DPT   Time Calculation: 18 mins

## 2022-09-15 NOTE — PROGRESS NOTES
Comprehensive Nutrition Assessment    Type and Reason for Visit: Reassess    Nutrition Recommendations/Plan:   Continue regular, Cardiac AVILA diet order  Provide Ensure Enlive BID to increase kcal/protein intake (700 kcal, 88 g Carbs, 40 g protein)     Malnutrition Assessment:  Malnutrition Status:  Mild malnutrition (09/15/22 1104)  - findings also consistent with sarcopenia   Context:  Acute illness     Findings of the 6 clinical characteristics of malnutrition:   Energy Intake:  No significant decrease in energy intake  Weight Loss:  No significant weight loss     Body Fat Loss:  Mild body fat loss, Triceps   Muscle Mass Loss:  Mild muscle mass loss, Clavicles (pectoralis & deltoids), Scapula (trapezius)  Fluid Accumulation:  No significant fluid accumulation,     Strength:  Not performed     Nutrition Assessment:     9/15; Follow up. Patient sitting in chair with  at bedside. Reports greatly improved appetite with no recent n/v/d - per documentation, averaging 75% of all meals. No chewing/swallowing problems. Enjoying Ensure, consuming at least 1/day.  has questions regarding nutritional status overall. Dietitian answered to best of knowledge. Weight has been downtrending throughout admission but stable since most recent RD encounter. Patient has been feeding self own meals per her and 's reports. Both are looking forward to discharging. If PO intake trend continues, patient likely meeting 100% kcal and protein needs. Last 3 Recorded Weights in this Encounter    09/07/22 0405 09/07/22 1007 09/10/22 1417   Weight: 59.7 kg (131 lb 9.6 oz) 59.4 kg (131 lb) 56.8 kg (125 lb 4.8 oz)       Nutrition Related Findings:      Wound Type: None    Last Bowel Movement Date: 09/13/22  Stool Appearance: Loose  Abdominal Assessment: Soft  Appetite: Fair  Bowel Sounds: Active   Edema:No data recorded    Nutr.  Labs:    Lab Results   Component Value Date/Time    GFR est AA >60 09/12/2022 04:07 AM GFR est non-AA >60 09/12/2022 04:07 AM    Creatinine 0.70 09/12/2022 04:07 AM    BUN 12 09/12/2022 04:07 AM    Sodium 139 09/12/2022 04:07 AM    Potassium 3.8 09/12/2022 04:07 AM    Chloride 108 09/12/2022 04:07 AM    CO2 26 09/12/2022 04:07 AM       Lab Results   Component Value Date/Time    Glucose 97 09/12/2022 04:07 AM       Lab Results   Component Value Date/Time    Hemoglobin A1c 5.2 09/06/2022 09:04 AM       Nutr. Meds:  Norvasc, Lovenox, folvite, lisinopril, zofran PRN, Protonix, miralax PRN, theragran, thiamine     Current Nutrition Intake & Therapies:  Average Meal Intake: 51-75%  Average Supplement Intake: %  ADULT DIET Regular; Low Fat/Low Chol/High Fiber/AVILA  ADULT ORAL NUTRITION SUPPLEMENT Breakfast, Dinner; Standard High Calorie/High Protein    Anthropometric Measures:  Height: 5' 4\" (162.6 cm)  Ideal Body Weight (IBW): 120 lbs (55 kg)  Admission Body Weight: 142 lb  Current Body Wt:  56.8 kg (125 lb 3.5 oz), 104.4 % IBW.  Bed scale  Current BMI (kg/m2): 21.5        Weight Adjustment: No adjustment                 BMI Category: Underweight (BMI less than 22) age over 72    Estimated Daily Nutrient Needs:  Energy Requirements Based On: Kcal/kg  Weight Used for Energy Requirements: Admission  Energy (kcal/day): 1420 - 1590 kcal/d (25-28 kcal/kg)  Weight Used for Protein Requirements: Admission  Protein (g/day): 45 - 63 g (0.8-1.1 g/kg)  Method Used for Fluid Requirements: 1 ml/kcal  Fluid (ml/day): 1420 - 1590 mL    Nutrition Diagnosis:   No nutrition diagnosis at this time    Inadequate protein-energy intake related to early satiety (difficulty with self-feeding) as evidenced by intake 0-25%, weight loss (declines assistance from RD with eating lunch meal today) - RESOLVED    Nutrition Interventions:   Food and/or Nutrient Delivery: Continue current diet, Continue oral nutrition supplement  Nutrition Education/Counseling: No recommendations at this time  Coordination of Nutrition Care: Continue to monitor while inpatient, Interdisciplinary rounds  Plan of Care discussed with: patient    Goals:  Previous Goal Met: Goal(s) achieved  Goals: Meet at least 75% of estimated needs, by next RD assessment       Nutrition Monitoring and Evaluation:   Behavioral-Environmental Outcomes: None identified  Food/Nutrient Intake Outcomes: Food and nutrient intake, Supplement intake  Physical Signs/Symptoms Outcomes: Biochemical data, Weight, GI status    Discharge Planning:    Continue oral nutrition supplement    Rain Vides MS, RD  Contact: Ext: [phone], or via [place]

## 2022-09-15 NOTE — PROGRESS NOTES
Problem: Self Care Deficits Care Plan (Adult)  Goal: *Acute Goals and Plan of Care (Insert Text)  Description: FUNCTIONAL STATUS PRIOR TO ADMISSION: per patient and her  via (phone call) patient has had a decline in her mobility over the last ~6 months, does not use adaptive equipment, however per her  she takes \"little steps\" when walking and he stated \"has a hard time walking crisply\" Patient's  reports episodes of legs not functioning at night and inability to move them and as day progresses she can move them again. Both report decreased balance over the last mew months. Patient was active in past and a . HOME SUPPORT: The patient lived with her  but did not require assist. However recent increase in falls and decreased balance    Occupational Therapy Goals  Initiated 9/10/2022  1. Patient will perform grooming with supervision/set-up seated unsupported within 7 day(s). 2.  Patient will maintain static sitting balance with UE support at midline with min multi-modal cues > or = 1 minute within 7 day(s). 3.  Patient will maintain static standing balance with bilateral UE support > or = 1 minute with moderate assist of 1 and min cues within 7 day(s)  4. Patient will perform squat pivot transfer to bedside commode with moderate assist of 1 within 7 day(s). 5.  Patient will position LE's in bridge and elevate hips with verbal cue x's 3 reps to assist with repositioning and toileting in bed within 7 day(s). 6.  Patient will perform cervical ROM and bilateral UE AROM with min cues within 7 day(s). Outcome: Progressing Towards Goal   OCCUPATIONAL THERAPY TREATMENT  Patient: Cristopher Silverio (89 y.o. female)  Date: 9/15/2022  Diagnosis: UTI (urinary tract infection) [N39.0] <principal problem not specified>      Precautions: Fall, Skin  Chart, occupational therapy assessment, plan of care, and goals were reviewed.     ASSESSMENT  Patient continues with skilled OT services and is progressing towards goals. Patient received seated on BSC, placed by RN. Patient reports ready for hygiene. She is able to perform with CGA in standing. Patient to EOB for bathing tasks and gown change. She is able to manage LE dressing in sitting/standing with CGA for underwear and socks. Patient left in chair at end of session in NAD. Current Level of Function Impacting Discharge (ADLs): CGA     Other factors to consider for discharge:          PLAN :  Patient continues to benefit from skilled intervention to address the above impairments. Continue treatment per established plan of care to address goals. Recommend with staff: OOB to chair for meals    Recommend next OT session: progression of goals     Recommendation for discharge: (in order for the patient to meet his/her long term goals)  Therapy up to 5 days/week in SNF setting    This discharge recommendation:  Has been made in collaboration with the attending provider and/or case management    IF patient discharges home will need the following DME: TBD       SUBJECTIVE:   Patient stated Can I put on underwear? [employer]    OBJECTIVE DATA SUMMARY:   Cognitive/Behavioral Status:  Neurologic State: Alert  Orientation Level: Oriented to time  Cognition: Follows commands  Perception: Appears intact  Perseveration: No perseveration noted  Safety/Judgement: Awareness of environment    Functional Mobility and Transfers for ADLs:  Bed Mobility:       Transfers:  Sit to Stand: Contact guard assistance;Assist x1;Additional time (verbal cues)  Functional Transfers  Toilet Transfer : Contact guard assistance       Balance:  Sitting: Intact  Standing: With support    ADL Intervention:            Upper Body Bathing  Bathing Assistance: Set-up  Position Performed: Seated edge of bed         Lower Body Bathing  Bathing Assistance: Contact guard assistance  Perineal  : Contact guard assistance  Position Performed: Standing  Cues: Verbal cues provided (physical assist)    Upper Body Dressing Assistance  Dressing Assistance: Contact guard assistance    Lower Body Dressing Assistance  Dressing Assistance: Contact guard assistance  Underpants: Contact guard assistance  Socks: Supervision  Leg Crossed Method Used: Yes  Position Performed: Seated in chair    Toileting  Toileting Assistance: Contact guard assistance  Bladder Hygiene: Contact guard assistance  Clothing Management: Contact guard assistance    Cognitive Retraining  Safety/Judgement: Awareness of environment    Therapeutic Exercises:       Pain:  None reported     Activity Tolerance:   Good    After treatment patient left in no apparent distress:   Sitting in chair, Call bell within reach, and Bed / chair alarm activated    COMMUNICATION/COLLABORATION:   The patients plan of care was discussed with: Physical therapist and Registered nurse.      Veronica Ortiz, OTR/L  Time Calculation: 23 mins

## 2022-09-15 NOTE — PROGRESS NOTES
Bedside shift change report given to Indira Bolivar (oncoming nurse) by Myranda Carvalho (offgoing nurse). Report included the following information SBAR, MAR, and Recent Results.

## 2022-09-16 PROCEDURE — 97116 GAIT TRAINING THERAPY: CPT

## 2022-09-16 PROCEDURE — 74011250637 HC RX REV CODE- 250/637: Performed by: HOSPITALIST

## 2022-09-16 PROCEDURE — 74011250637 HC RX REV CODE- 250/637: Performed by: PSYCHIATRY & NEUROLOGY

## 2022-09-16 PROCEDURE — 97110 THERAPEUTIC EXERCISES: CPT

## 2022-09-16 PROCEDURE — 74011250637 HC RX REV CODE- 250/637: Performed by: INTERNAL MEDICINE

## 2022-09-16 PROCEDURE — 97535 SELF CARE MNGMENT TRAINING: CPT

## 2022-09-16 PROCEDURE — 74011000250 HC RX REV CODE- 250: Performed by: INTERNAL MEDICINE

## 2022-09-16 PROCEDURE — 65270000029 HC RM PRIVATE

## 2022-09-16 PROCEDURE — 74011250636 HC RX REV CODE- 250/636: Performed by: HOSPITALIST

## 2022-09-16 RX ADMIN — THIAMINE HCL TAB 100 MG 100 MG: 100 TAB at 10:40

## 2022-09-16 RX ADMIN — LISINOPRIL 20 MG: 20 TABLET ORAL at 10:39

## 2022-09-16 RX ADMIN — AMLODIPINE BESYLATE 5 MG: 5 TABLET ORAL at 10:39

## 2022-09-16 RX ADMIN — ASPIRIN 81 MG: 81 TABLET, CHEWABLE ORAL at 10:39

## 2022-09-16 RX ADMIN — Medication 10 ML: at 06:32

## 2022-09-16 RX ADMIN — Medication 10 ML: at 14:00

## 2022-09-16 RX ADMIN — ALPRAZOLAM 0.5 MG: 0.5 TABLET ORAL at 21:29

## 2022-09-16 RX ADMIN — SERTRALINE HYDROCHLORIDE 100 MG: 50 TABLET ORAL at 10:39

## 2022-09-16 RX ADMIN — ENOXAPARIN SODIUM 40 MG: 100 INJECTION SUBCUTANEOUS at 10:41

## 2022-09-16 RX ADMIN — FOLIC ACID 1 MG: 1 TABLET ORAL at 10:39

## 2022-09-16 RX ADMIN — Medication 10 ML: at 21:29

## 2022-09-16 RX ADMIN — THERA TABS 1 TABLET: TAB at 10:40

## 2022-09-16 RX ADMIN — MICONAZOLE NITRATE 2 % TOPICAL POWDER: at 10:40

## 2022-09-16 RX ADMIN — PANTOPRAZOLE SODIUM 40 MG: 40 TABLET, DELAYED RELEASE ORAL at 06:31

## 2022-09-16 RX ADMIN — MICONAZOLE NITRATE 2 % TOPICAL POWDER: at 18:00

## 2022-09-16 RX ADMIN — CIPROFLOXACIN 500 MG: 500 TABLET, FILM COATED ORAL at 10:39

## 2022-09-16 RX ADMIN — CIPROFLOXACIN 500 MG: 500 TABLET, FILM COATED ORAL at 21:29

## 2022-09-16 NOTE — PROGRESS NOTES
Problem: Pressure Injury - Risk of  Goal: *Prevention of pressure injury  Description: Document Asad Scale and appropriate interventions in the flowsheet. Outcome: Progressing Towards Goal  Note: Pressure Injury Interventions:  Sensory Interventions: Assess changes in LOC    Moisture Interventions: Absorbent underpads    Activity Interventions: Increase time out of bed    Mobility Interventions: Float heels    Nutrition Interventions: Document food/fluid/supplement intake    Friction and Shear Interventions: HOB 30 degrees or less                Problem: Patient Education: Go to Patient Education Activity  Goal: Patient/Family Education  Outcome: Progressing Towards Goal     Problem: Falls - Risk of  Goal: *Absence of Falls  Description: Document Michael Fall Risk and appropriate interventions in the flowsheet.   Outcome: Progressing Towards Goal  Note: Fall Risk Interventions:  Mobility Interventions: Bed/chair exit alarm    Mentation Interventions: Bed/chair exit alarm    Medication Interventions: Bed/chair exit alarm    Elimination Interventions: Call light in reach    History of Falls Interventions: Bed/chair exit alarm

## 2022-09-16 NOTE — PROGRESS NOTES
Bedside shift change report given to Claudeen Arch (oncoming nurse) by Nirmala Price (offgoing nurse). Report included the following information SBAR, Intake/Output, MAR, Recent Results, Med Rec Status, and Quality Measures.

## 2022-09-16 NOTE — PROGRESS NOTES
Problem: Mobility Impaired (Adult and Pediatric)  Goal: *Acute Goals and Plan of Care (Insert Text)  Description: FUNCTIONAL STATUS PRIOR TO ADMISSION: Pt reports impaired balance and several falls in recent months. She reports ambulating without assistive device and frequently feels as though her [place] Footman is off\" but is unable to offer further description. Some pt reports are inconsistent over course of encounter, including duration of R proximal leg pain. Per OT following OT conversation with spouse pt has experienced limited mobility x 2-3 years with increased decline over past 6 months. She ambulates with shuffling gait and no assistive device. She reportedly experiences episodes of limited gross motor abilities and inability to ambulate with increased difficulty in mornings vs afternoons. HOME SUPPORT PRIOR TO ADMISSION: The patient lived with spouse who reports concern regarding ability to care for patient at home with current impairments. Physical Therapy Goals  Initiated 9/10/2022  1. Patient will move from supine to sit and sit to supine  in bed with moderate assistance  within 7 day(s). 2.  Patient will transfer from bed to chair and chair to bed with moderate assistance  using the least restrictive device within 7 day(s). 3.  Patient will perform sit to stand with moderate assistance 3/3 trials within 7 day(s). 4.  Patient will ambulate with moderate assistance  for 10 feet with the least restrictive device within 7 day(s). 5.  Patient will perform static and dynamic sitting activities x 10 mins with supervision within 7 days. Outcome: Progressing Towards Goal   PHYSICAL THERAPY TREATMENT  Patient: Leia Hamm (45 y.o. female)  Date: 9/16/2022  Diagnosis: UTI (urinary tract infection) [N39.0] <principal problem not specified>      Precautions: Fall, Skin  Chart, physical therapy assessment, plan of care and goals were reviewed.     ASSESSMENT  Patient continues with skilled PT services and is progressing slowly towards goals. The patient endorses a gradual decline in function and several falls in the home over the last few months. At present, she shows decreased environmental awareness, multiple gait deviations, and slow mobility. Git training completed over 72' using a RW and requiring CGA but with heavy cues. Within 72' she required maximum cues for erect posture, increased right step length/clearance, and required tactile cues or physical assist to avoid obstacles with the RW x3. Gait training required ~10 minutes at her self-selected cadene. She was independent at baseline and observed deviations elevate her risk for additoinal falls. Continue to recommend discharge to SNF level rehab. If not SNF, then home with RW and family assist along with HHPT and transition to OP when no longer homebound. Current Level of Function Impacting Discharge (mobility/balance): CGA for gait, poor endurance, limited environmental awareness    Other factors to consider for discharge: recent hx of falls         PLAN :  Patient continues to benefit from skilled intervention to address the above impairments. Continue treatment per established plan of care. to address goals. Recommendation for discharge: (in order for the patient to meet his/her long term goals)  Therapy up to 5 days/week in SNF setting vs home with family asssit + HHPT and transition to OP    This discharge recommendation:  Has been made in collaboration with the attending provider and/or case management    IF patient discharges home will need the following DME: rolling walker       SUBJECTIVE:   Patient stated My right leg gives me trouble.     OBJECTIVE DATA SUMMARY:   Critical Behavior:  Neurologic State: Alert  Orientation Level: Oriented X4  Cognition: Follows commands  Safety/Judgement: Awareness of environment  Functional Mobility Training:  Bed Mobility:     Supine to Sit: Stand-by assistance;Bed Modified; Adaptive equipment Scooting: Stand-by assistance        Transfers:  Sit to Stand: Contact guard assistance;Minimum assistance  Stand to Sit: Contact guard assistance                             Balance:  Sitting: Intact; Without support  Standing: Impaired; With support  Standing - Static: Good  Standing - Dynamic : Fair  Ambulation/Gait Training:  Distance (ft): 65 Feet (ft)  Assistive Device: Gait belt;Walker, rolling  Ambulation - Level of Assistance: Contact guard assistance; Additional time        Gait Abnormalities: Decreased step clearance;Shuffling gait (head/flexed posture)        Base of Support: Widened     Speed/Valerie: Pace decreased (<100 feet/min)  Step Length: Right shortened                    Stairs: Therapeutic Exercises:   Seated LAQs, marching x10  Pain Rating:      Activity Tolerance:   Fair    After treatment patient left in no apparent distress:   Supine in bed and Call bell within reach    COMMUNICATION/COLLABORATION:   The patients plan of care was discussed with: Registered nurse.      Carlyn Juarez, PT, DPT   Time Calculation: 26 mins

## 2022-09-16 NOTE — PROGRESS NOTES
4:26 pm:  MD still awaiting a return call to complete the peer to peer. If denied, patient will need HH arranged and a rolling walker. CM to send a [facility] referral to [facility] in the event SNF if denied; patient will require an authorization for [facility] as well and this does not occur over the weekend. 2:20 pm:  CM s/w Dr. Mary Ann Wright. [id number] initiated; he is awaiting a return call. CM s/w [place] at [facility]. Patient has been denied for SNF. Patient provided update as well as patient's  and brother-in-law by phone. Patient evaluated by OT who is stating patient is not safe to return home with [facility] and needs rehab. Attending MD informed via [employer] that patient and family wish to proceed with a [id number] review. Transition of Care Plan - RUR 9%:  Medical management continues  2. PT/OT following   3. CM following - insurance denied SNF. Family requesting [id number] for appeal.  Attending MD notified to contact patient's insurance for [id number] ([phone] option 1, option 3, option 2 then option 1)  4. Await outcome from [id number]  5. Wheelchair van transport at discharge  5.    Outpatient follow-up    Chemo Savage LCSW

## 2022-09-16 NOTE — PROGRESS NOTES
Andi Antony [facility]  [address] [facility], [address] [place] [address]  [phone]      Hospitalist Progress Note      NAME: Laura Bardales   :  1945  MRM:  [id number]    Date/Time of service: 2022  10:06 AM       Subjective:     Chief Complaint:  Patient was personally seen and examined by me during this time period. Chart reviewed. Working with PT. No chest pain, SOB  She say's she is weak and cannot go home       Objective:       Vitals:       Last 24hrs VS reviewed since prior progress note.  Most recent are:    Visit Vitals  /69 (BP 1 Location: Left upper arm, BP Patient Position: Sitting)   Pulse 98   Temp 97 °F (36.1 °C)   Resp 20   Ht 5' 4\" (1.626 m)   Wt 56.1 kg (123 lb 9.6 oz)   SpO2 96%   BMI 21.22 kg/m²     SpO2 Readings from Last 6 Encounters:   22 96%   17 97%   07/06/15 95%        No intake or output data in the 24 hours ending 22 1414       Exam:     Physical Exam:    Gen:  frail, ill-appearing, NAD  HEENT:  Pink conjunctivae, PERRL, hearing intact to voice, moist mucous membranes  Neck:  Supple, without masses, thyroid non-tender  Resp:  No accessory muscle use, clear breath sounds without wheezes rales or rhonchi  Card:  No murmurs, normal S1, S2 without thrills, bruits or peripheral edema  Abd:  Soft, non-tender, non-distended, normoactive bowel sounds are present  Musc:  No cyanosis or clubbing  Skin:  No rashes  Neuro:  Cranial nerves 3-12 are grossly intact, generalized weakness, follows commands appropriately  Psych:  Good insight, oriented to person, place and time, alert    Medications Reviewed: (see below)    Lab Data Reviewed: (see below)    ______________________________________________________________________    Medications:     Current Facility-Administered Medications   Medication Dose Route Frequency    thiamine mononitrate (B-1) tablet 100 mg  100 mg Oral DAILY    miconazole (MICOTIN) 2 % powder   Topical BID    therapeutic multivitamin (THERAGRAN) tablet 1 Tablet  1 Tablet Oral DAILY    folic acid (FOLVITE) tablet 1 mg  1 mg Oral DAILY    enoxaparin (LOVENOX) injection 40 mg  40 mg SubCUTAneous Q24H    aspirin chewable tablet 81 mg  81 mg Oral DAILY    ciprofloxacin HCl (CIPRO) tablet 500 mg  500 mg Oral Q12H    lisinopriL (PRINIVIL, ZESTRIL) tablet 20 mg  20 mg Oral DAILY    loperamide (IMODIUM) capsule 2 mg  2 mg Oral Q4H PRN    sodium chloride (NS) flush 5-10 mL  5-10 mL IntraVENous PRN    ALPRAZolam (XANAX) tablet 0.5 mg  0.5 mg Oral QHS PRN    amLODIPine (NORVASC) tablet 5 mg  5 mg Oral DAILY    pantoprazole (PROTONIX) tablet 40 mg  40 mg Oral ACB    sertraline (ZOLOFT) tablet 100 mg  100 mg Oral DAILY    sodium chloride (NS) flush 5-40 mL  5-40 mL IntraVENous Q8H    sodium chloride (NS) flush 5-40 mL  5-40 mL IntraVENous PRN    acetaminophen (TYLENOL) tablet 650 mg  650 mg Oral Q6H PRN    Or    acetaminophen (TYLENOL) suppository 650 mg  650 mg Rectal Q6H PRN    polyethylene glycol (MIRALAX) packet 17 g  17 g Oral DAILY PRN    ondansetron (ZOFRAN ODT) tablet 4 mg  4 mg Oral Q8H PRN    Or    ondansetron (ZOFRAN) injection 4 mg  4 mg IntraVENous Q6H PRN          Lab Review:     No results for input(s): WBC, HGB, HCT, PLT, HGBEXT, HCTEXT, PLTEXT, HGBEXT, HCTEXT, PLTEXT in the last 72 hours. No results for input(s): NA, K, CL, CO2, GLU, BUN, CREA, CA, MG, PHOS, ALB, TBIL, TBILI, ALT, INR, INREXT, INREXT in the last 72 hours. No lab exists for component: SGOT    No results found for: GLUCPOC       Assessment / Plan:     64 yo hx of HTN, depression, etoh abuse, presented w/ sepsis, ataxia, UTI, E. Coli bacteremia    1) Sepsis/UTI/E. Coli bacteremia: sepsis POA, now resolved. ID recommended oral Cipro till 09/17    2) Weakness/ataxia: slowly improving. Likely from chronic etoh abuse. Head MRI was neg for CVA. Neuro was following. Cont PT/OT. Awaiting rehab (insurance auth pending)  Doing a peer to peer today.      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 Cont ASA per neuro     4) HTN: cont norvasc, lisinopril     5) Depression: cont zoloft, xanax     6) Etoh abuse: no withdrawal.  Cont vitamins    Total time spent with patient care: 20 Minutes **I personally saw and examined the patient during this time period**                 Care Plan discussed with: Patient, nursing, CM, PT/OT    Discussed:  care plan    Prophylaxis:  Lovenox    Disposition:  [facility]/Rehab. Stable for d/c.  Recommend d/c to snf vs ipr           ___________________________________________________    Attending Physician: Julia Burkett MD

## 2022-09-16 NOTE — PROGRESS NOTES
Problem: Self Care Deficits Care Plan (Adult)  Goal: *Acute Goals and Plan of Care (Insert Text)  Description: FUNCTIONAL STATUS PRIOR TO ADMISSION: per patient and her  via (phone call) patient has had a decline in her mobility over the last ~6 months, does not use adaptive equipment, however per her  she takes \"little steps\" when walking and he stated \"has a hard time walking crisply\" Patient's  reports episodes of legs not functioning at night and inability to move them and as day progresses she can move them again. Both report decreased balance over the last mew months. Patient was active in past and a . HOME SUPPORT: The patient lived with her  but did not require assist. However recent increase in falls and decreased balance    Occupational Therapy Goals  Weekly re-assessment, goals upgraded   1. Patient will perform grooming in stand with modified independence within 7 day(s). 2.  Patient will perform bathing with supervision/set-up within 7 day(s). 3.  Patient will perform lower body dressing with supervision/set-up within 7 day(s). 4.  Patient will perform toilet transfers with modified independence within 7 day(s). 5.  Patient will perform all aspects of toileting with modified independence within 7 day(s). 6.  Patient will complete bathroom mobility at RW level with distant supervision and no cues required for safe walker mgmt within 7 days. Initiated 9/10/2022- reviewed 9/16/22  1. Patient will perform grooming with supervision/set-up seated unsupported within 7 day(s). - met, upgrade to modified independence   2. Patient will maintain static sitting balance with UE support at midline with min multi-modal cues > or = 1 minute within 7 day(s). - met  3. Patient will maintain static standing balance with bilateral UE support > or = 1 minute with moderate assist of 1 and min cues within 7 day(s)- met  4.   Patient will perform squat pivot transfer to bedside commode with moderate assist of 1 within 7 day(s). - met   5. Patient will position LE's in bridge and elevate hips with verbal cue x's 3 reps to assist with repositioning and toileting in bed within 7 day(s). - met  6. Patient will perform cervical ROM and bilateral UE AROM with min cues within 7 day(s). Outcome: Progressing Towards Goal   OCCUPATIONAL THERAPY TREATMENT/WEEKLY RE-EVALUATION  Patient: Yeison Mckeon (64 y.o. female)  Date: 9/16/2022  Diagnosis: UTI (urinary tract infection) [N39.0] <principal problem not specified>      Precautions: Fall, Skin  Chart, occupational therapy assessment, plan of care, and goals were reviewed. ASSESSMENT  Based on the objective data described below, patient has met most short term goals over the course of the week. ADL goals upgraded to supervision level, using RW as needed for AD related mobility. Patient is still well below baseline level of ADL function and related mobility. She requires increased assistance to safely manage a RW as she has not ever used one, and is not yet able to ambulate house hold distances. She is impacted by general weakness and fatigue, limited standing tolerance, back pain during grooming in stand, bathing, dressing, and toileting. She has made good gains over this reporting period and continued gains are anticipated. However, she requires higher level of frequency and hands on training to reach these goals- rehab services recommended. SNF level is currently appropriate. Current Level of Function Impacting Discharge (ADLs): min to mod assistance to complete ADL tasks - increased need for assistive devices/DME than at baseline     Other factors to consider for discharge: spouse is unable to provide extensive physical assistance          PLAN :  Goals have been updated based on progression since last assessment. Patient continues to benefit from skilled intervention to address the above impairments.  Continue to follow patient 5 times a week to address goals. Recommend with staff: in chair for meals, ambulate to bathroom if able , encourage ADL participation     Recommend next OT session: bathroom mobility, ADL in standing, HEP    Recommendation for discharge: (in order for the patient to meet his/her long term goals)  Therapy up to 5 days/week in SNF setting    This discharge recommendation:  Has been made in collaboration with the attending provider and/or case management    Equipment recommendations for successful discharge (if) home: RW, shower chair        SUBJECTIVE:   Patient pleasant and cooperative     OBJECTIVE DATA SUMMARY:   Cognitive/Behavioral Status:  Neurologic State: Alert  Orientation Level: Oriented X4  Cognition: Follows commands             Functional Mobility and Transfers for ADLs:  Bed Mobility:  Supine to Sit: Supervision;Assist x1;Additional time;Bed Modified    Transfers:  Sit to Stand: Contact guard assistance  Functional Transfers  Bathroom Mobility: Minimum assistance  Cues: Physical assistance; Tactile cues provided;Verbal cues provided  Adaptive Equipment: Walker (comment);Grab bars       Balance:  Sitting: Intact; Without support    ADL Intervention:       Grooming  Grooming Assistance: Supervision  Position Performed: Standing  Cues: Verbal cues provided    Upper Body Bathing  Bathing Assistance: Minimum assistance;Supervision  Position Performed: Other (comment) (seated on toilet)  Cues: Verbal cues provided (cues for thoroughness)    Lower Body Bathing  Bathing Assistance: Minimum assistance  Position Performed: Seated on toilet  Lower Body : Minimum assistance  Cues: Physical assistance; Tactile cues provided;Verbal cues provided     Standing tolerance < 5 min during ADL. Increased assistance required ambulating back from bathroom to chair  (<15')- patient fatigued easily, required frequent cues (verbal and tactile) for safe walker mgmt.         Pain:  General low back pain after standing at sink Activity Tolerance:   Fair and requires frequent rest breaks    After treatment patient left in no apparent distress:   Sitting in chair, Call bell within reach, Bed / chair alarm activated, and Caregiver / family present    COMMUNICATION/COLLABORATION:   The patients plan of care was discussed with: Physical Therapist, Registered Nurse, and MARILYN Carlisle OT  Time Calculation: 17 mins

## 2022-09-17 PROCEDURE — 74011250637 HC RX REV CODE- 250/637: Performed by: PSYCHIATRY & NEUROLOGY

## 2022-09-17 PROCEDURE — 74011250636 HC RX REV CODE- 250/636: Performed by: HOSPITALIST

## 2022-09-17 PROCEDURE — 74011000250 HC RX REV CODE- 250: Performed by: INTERNAL MEDICINE

## 2022-09-17 PROCEDURE — 74011250637 HC RX REV CODE- 250/637: Performed by: INTERNAL MEDICINE

## 2022-09-17 PROCEDURE — 74011250637 HC RX REV CODE- 250/637: Performed by: HOSPITALIST

## 2022-09-17 PROCEDURE — 65270000029 HC RM PRIVATE

## 2022-09-17 RX ORDER — CIPROFLOXACIN 500 MG/1
500 TABLET ORAL EVERY 12 HOURS
Status: COMPLETED | OUTPATIENT
Start: 2022-09-17 | End: 2022-09-17

## 2022-09-17 RX ADMIN — THERA TABS 1 TABLET: TAB at 08:59

## 2022-09-17 RX ADMIN — AMLODIPINE BESYLATE 5 MG: 5 TABLET ORAL at 08:59

## 2022-09-17 RX ADMIN — Medication 10 ML: at 21:46

## 2022-09-17 RX ADMIN — ASPIRIN 81 MG: 81 TABLET, CHEWABLE ORAL at 08:59

## 2022-09-17 RX ADMIN — CIPROFLOXACIN 500 MG: 500 TABLET, FILM COATED ORAL at 08:59

## 2022-09-17 RX ADMIN — THIAMINE HCL TAB 100 MG 100 MG: 100 TAB at 08:59

## 2022-09-17 RX ADMIN — CIPROFLOXACIN 500 MG: 500 TABLET, FILM COATED ORAL at 21:45

## 2022-09-17 RX ADMIN — LISINOPRIL 20 MG: 20 TABLET ORAL at 09:00

## 2022-09-17 RX ADMIN — ALPRAZOLAM 0.5 MG: 0.5 TABLET ORAL at 21:45

## 2022-09-17 RX ADMIN — Medication 10 ML: at 05:45

## 2022-09-17 RX ADMIN — MICONAZOLE NITRATE 2 % TOPICAL POWDER: at 18:00

## 2022-09-17 RX ADMIN — Medication 10 ML: at 14:00

## 2022-09-17 RX ADMIN — ENOXAPARIN SODIUM 40 MG: 100 INJECTION SUBCUTANEOUS at 09:00

## 2022-09-17 RX ADMIN — MICONAZOLE NITRATE 2 % TOPICAL POWDER: at 09:00

## 2022-09-17 RX ADMIN — FOLIC ACID 1 MG: 1 TABLET ORAL at 08:59

## 2022-09-17 RX ADMIN — PANTOPRAZOLE SODIUM 40 MG: 40 TABLET, DELAYED RELEASE ORAL at 05:45

## 2022-09-17 RX ADMIN — SERTRALINE HYDROCHLORIDE 100 MG: 50 TABLET ORAL at 08:59

## 2022-09-17 NOTE — PROGRESS NOTES
Andi Antony [facility] 79  [address] [facility], [address] [place] [address]  [phone]      Hospitalist Progress Note      NAME: Tobias Crews   :  1945  MRM:  [id number]    Date/Time of service: 2022  10:06 AM       Subjective:     Chief Complaint:  Patient was personally seen and examined by me during this time period. Chart reviewed. There is no new complaint. Pending discharge to SNF Vs rehab. Objective:       Vitals:       Last 24hrs VS reviewed since prior progress note.  Most recent are:    Visit Vitals  /79 (BP 1 Location: Right upper arm, BP Patient Position: At rest)   Pulse 89   Temp 97.5 °F (36.4 °C)   Resp 18   Ht 5' 4\" (1.626 m)   Wt 54.8 kg (120 lb 14.4 oz)   SpO2 97%   BMI 20.75 kg/m²     SpO2 Readings from Last 6 Encounters:   22 97%   17 97%   07/06/15 95%        No intake or output data in the 24 hours ending 22 1230       Exam:     Physical Exam:    Gen:  frail, ill-appearing, NAD  HEENT:  Pink conjunctivae, PERRL, hearing intact to voice, moist mucous membranes  Neck:  Supple, without masses, thyroid non-tender  Resp:  No accessory muscle use, clear breath sounds without wheezes rales or rhonchi  Card:  No murmurs, normal S1, S2 without thrills, bruits or peripheral edema  Abd:  Soft, non-tender, non-distended, normoactive bowel sounds are present  Musc:  No cyanosis or clubbing  Skin:  No rashes  Neuro:  Cranial nerves 3-12 are grossly intact, generalized weakness, follows commands appropriately  Psych:  Good insight, oriented to person, place and time, alert    Medications Reviewed: (see below)    Lab Data Reviewed: (see below)    ______________________________________________________________________    Medications:     Current Facility-Administered Medications   Medication Dose Route Frequency    thiamine mononitrate (B-1) tablet 100 mg  100 mg Oral DAILY    miconazole (MICOTIN) 2 % powder   Topical BID    therapeutic multivitamin [facility]) tablet 1 Tablet  1 Tablet Oral DAILY    folic acid (FOLVITE) tablet 1 mg  1 mg Oral DAILY    enoxaparin (LOVENOX) injection 40 mg  40 mg SubCUTAneous Q24H    aspirin chewable tablet 81 mg  81 mg Oral DAILY    ciprofloxacin HCl (CIPRO) tablet 500 mg  500 mg Oral Q12H    lisinopriL (PRINIVIL, ZESTRIL) tablet 20 mg  20 mg Oral DAILY    loperamide (IMODIUM) capsule 2 mg  2 mg Oral Q4H PRN    sodium chloride (NS) flush 5-10 mL  5-10 mL IntraVENous PRN    ALPRAZolam (XANAX) tablet 0.5 mg  0.5 mg Oral QHS PRN    amLODIPine (NORVASC) tablet 5 mg  5 mg Oral DAILY    pantoprazole (PROTONIX) tablet 40 mg  40 mg Oral ACB    sertraline (ZOLOFT) tablet 100 mg  100 mg Oral DAILY    sodium chloride (NS) flush 5-40 mL  5-40 mL IntraVENous Q8H    sodium chloride (NS) flush 5-40 mL  5-40 mL IntraVENous PRN    acetaminophen (TYLENOL) tablet 650 mg  650 mg Oral Q6H PRN    Or    acetaminophen (TYLENOL) suppository 650 mg  650 mg Rectal Q6H PRN    polyethylene glycol (MIRALAX) packet 17 g  17 g Oral DAILY PRN    ondansetron (ZOFRAN ODT) tablet 4 mg  4 mg Oral Q8H PRN    Or    ondansetron (ZOFRAN) injection 4 mg  4 mg IntraVENous Q6H PRN          Lab Review:     No results for input(s): WBC, HGB, HCT, PLT, HGBEXT, HCTEXT, PLTEXT, HGBEXT, HCTEXT, PLTEXT in the last 72 hours. No results for input(s): NA, K, CL, CO2, GLU, BUN, CREA, CA, MG, PHOS, ALB, TBIL, TBILI, ALT, INR, INREXT, INREXT in the last 72 hours. No lab exists for component: SGOT    No results found for: GLUCPOC       Assessment / Plan:     66 yo hx of HTN, depression, etoh abuse, presented w/ sepsis, ataxia, UTI, E. Coli bacteremia    1) Sepsis/UTI/E. Coli bacteremia: sepsis POA, now resolved. ID recommended oral Cipro till 09/17. Today is last day of antibiotics    2) Weakness/ataxia: slowly improving. Likely from chronic etoh abuse. Head MRI was neg for CVA. Neuro was following. Cont PT/OT.   Awaiting rehab (insurance auth denied)  Awaiting peer to peer.  PT recommending SNF vs HHPT    3) Severe stenosis of right M1: incidental findings on head MRA. Cont ASA per neuro     4) HTN: cont norvasc, lisinopril     5) Depression: cont zoloft, xanax     6) Etoh abuse: no withdrawal.  Cont vitamins    Total time spent with patient care: 20 Minutes **I personally saw and examined the patient during this time period**                 Care Plan discussed with: Patient, nursing, CM, PT/OT    Discussed:  care plan    Prophylaxis:  Lovenox    Disposition:  [facility]/Rehab. Stable for d/c.  Recommend d/c to snf vs ipr           ___________________________________________________    Attending Physician: Jessenia iFtzgerald MD

## 2022-09-17 NOTE — PROGRESS NOTES
Problem: Pressure Injury - Risk of  Goal: *Prevention of pressure injury  Description: Document Asad Scale and appropriate interventions in the flowsheet. Outcome: Progressing Towards Goal  Note: Pressure Injury Interventions:  Sensory Interventions: Maintain/enhance activity level    Moisture Interventions: Absorbent underpads, Maintain skin hydration (lotion/cream)    Activity Interventions: Increase time out of bed    Mobility Interventions: HOB 30 degrees or less    Nutrition Interventions: Document food/fluid/supplement intake, Offer support with meals,snacks and hydration    Friction and Shear Interventions: HOB 30 degrees or less                Problem: Patient Education: Go to Patient Education Activity  Goal: Patient/Family Education  Outcome: Progressing Towards Goal     Problem: Falls - Risk of  Goal: *Absence of Falls  Description: Document Michael Fall Risk and appropriate interventions in the flowsheet.   Outcome: Progressing Towards Goal  Note: Fall Risk Interventions:  Mobility Interventions: Bed/chair exit alarm, Patient to call before getting OOB, PT Consult for mobility concerns, Utilize walker, cane, or other assistive device    Mentation Interventions: Bed/chair exit alarm, Increase mobility    Medication Interventions: Bed/chair exit alarm, Patient to call before getting OOB, Teach patient to arise slowly    Elimination Interventions: Bed/chair exit alarm, Call light in reach, Patient to call for help with toileting needs    History of Falls Interventions: Bed/chair exit alarm

## 2022-09-17 NOTE — PROGRESS NOTES
Problem: Pressure Injury - Risk of  Goal: *Prevention of pressure injury  Description: Document Asad Scale and appropriate interventions in the flowsheet. Outcome: Progressing Towards Goal  Note: Pressure Injury Interventions:  Sensory Interventions: Maintain/enhance activity level    Moisture Interventions: Absorbent underpads, Maintain skin hydration (lotion/cream)    Activity Interventions: Increase time out of bed    Mobility Interventions: HOB 30 degrees or less, PT/OT evaluation    Nutrition Interventions: Document food/fluid/supplement intake, Offer support with meals,snacks and hydration    Friction and Shear Interventions: HOB 30 degrees or less                Problem: Patient Education: Go to Patient Education Activity  Goal: Patient/Family Education  Outcome: Progressing Towards Goal     Problem: Falls - Risk of  Goal: *Absence of Falls  Description: Document Michael Fall Risk and appropriate interventions in the flowsheet.   Outcome: Progressing Towards Goal  Note: Fall Risk Interventions:  Mobility Interventions: Bed/chair exit alarm, Patient to call before getting OOB, PT Consult for mobility concerns, Utilize walker, cane, or other assistive device    Mentation Interventions: Bed/chair exit alarm, Increase mobility    Medication Interventions: Bed/chair exit alarm, Patient to call before getting OOB, Teach patient to arise slowly    Elimination Interventions: Bed/chair exit alarm, Call light in reach, Patient to call for help with toileting needs    History of Falls Interventions: Bed/chair exit alarm         Problem: Patient Education: Go to Patient Education Activity  Goal: Patient/Family Education  Outcome: Progressing Towards Goal     Problem: Pain  Goal: *Control of Pain  Outcome: Progressing Towards Goal     Problem: Fluid Volume - Risk of, Imbalanced  Goal: *Balanced intake and output  Outcome: Progressing Towards Goal     Problem: Patient Education: Go to Patient Education Activity  Goal: Patient/Family Education  Outcome: Progressing Towards Goal     Problem: Patient Education: Go to Patient Education Activity  Goal: Patient/Family Education  Outcome: Progressing Towards Goal     Problem: Patient Education: Go to Patient Education Activity  Goal: Patient/Family Education  Outcome: Progressing Towards Goal     Problem: Nutrition Deficit  Goal: *Optimize nutritional status  Outcome: Progressing Towards Goal

## 2022-09-17 NOTE — PROGRESS NOTES
9/17/2022  3:23 PM  Care Management Progress Note      ICD-10-CM ICD-9-CM    1. Sepsis with acute renal failure without septic shock, due to unspecified organism, unspecified acute renal failure type ([facility]  A41.9 038.9     R65.20 995.92     N17.9 584.9       2. ALBINA (acute kidney injury) ([facility]  N17.9 584.9       3. Elevated troponin  R77.8 790.6       4. Leukocytosis, unspecified type  D72.829 288.60       5. Nausea and vomiting in adult  R11.2 787.01       6. Suprapubic abdominal pain  R10.2 789.09       7. Pyelonephritis  N12 590.80       8. Bacteremia due to Escherichia coli  R78.81 790.7     B96.20 041.49       9. Gait disturbance [R26.9 (ICD-10-CM)]  R26.9 781.2           RUR:  9%  Risk Level: [x]Low []Moderate []High  Value-based purchasing: [] Yes [x] No  Bundle patient: [] Yes [x] No   Specify:     Transition of care plan:  Discharge pending placement arrangements. TBD - CM notified attending did not get a returned call from [facility] re [email or web address] for SNF denial. CM attempted to contact insurance company 2x; however, was unable to speak to anyone. CM requested a returned p/c through the automated system. Pt and pt's  updated. Pt's  wishes to call [facility] on Monday to appeal as well. [facility] following if pt is discharged home. Outpatient follow-up. Pt's family to transport.

## 2022-09-18 PROCEDURE — 65270000029 HC RM PRIVATE

## 2022-09-18 PROCEDURE — 74011250636 HC RX REV CODE- 250/636: Performed by: HOSPITALIST

## 2022-09-18 PROCEDURE — 74011000250 HC RX REV CODE- 250: Performed by: INTERNAL MEDICINE

## 2022-09-18 PROCEDURE — 74011250637 HC RX REV CODE- 250/637: Performed by: INTERNAL MEDICINE

## 2022-09-18 PROCEDURE — 74011250637 HC RX REV CODE- 250/637: Performed by: PSYCHIATRY & NEUROLOGY

## 2022-09-18 PROCEDURE — 74011250637 HC RX REV CODE- 250/637: Performed by: HOSPITALIST

## 2022-09-18 RX ADMIN — ENOXAPARIN SODIUM 40 MG: 100 INJECTION SUBCUTANEOUS at 09:43

## 2022-09-18 RX ADMIN — AMLODIPINE BESYLATE 5 MG: 5 TABLET ORAL at 09:43

## 2022-09-18 RX ADMIN — Medication 10 ML: at 22:00

## 2022-09-18 RX ADMIN — Medication 10 ML: at 18:50

## 2022-09-18 RX ADMIN — PANTOPRAZOLE SODIUM 40 MG: 40 TABLET, DELAYED RELEASE ORAL at 05:57

## 2022-09-18 RX ADMIN — ALPRAZOLAM 0.5 MG: 0.5 TABLET ORAL at 20:30

## 2022-09-18 RX ADMIN — ASPIRIN 81 MG: 81 TABLET, CHEWABLE ORAL at 09:43

## 2022-09-18 RX ADMIN — Medication 10 ML: at 05:57

## 2022-09-18 RX ADMIN — FOLIC ACID 1 MG: 1 TABLET ORAL at 09:43

## 2022-09-18 RX ADMIN — SERTRALINE HYDROCHLORIDE 100 MG: 50 TABLET ORAL at 09:43

## 2022-09-18 RX ADMIN — ACETAMINOPHEN 650 MG: 325 TABLET ORAL at 20:30

## 2022-09-18 RX ADMIN — MICONAZOLE NITRATE 2 % TOPICAL POWDER: at 09:44

## 2022-09-18 RX ADMIN — THIAMINE HCL TAB 100 MG 100 MG: 100 TAB at 09:43

## 2022-09-18 RX ADMIN — LISINOPRIL 20 MG: 20 TABLET ORAL at 09:43

## 2022-09-18 RX ADMIN — THERA TABS 1 TABLET: TAB at 09:43

## 2022-09-18 NOTE — PROGRESS NOTES
Bedside shift change report given to Enoc (oncoming nurse) by Joseph Muller (offgoing nurse). Report included the following information SBAR, Kardex, MAR, and Recent Results.

## 2022-09-18 NOTE — PROGRESS NOTES
Bedside shift change report given to Via Snow Sanchez (oncoming nurse) by Vangie Odonnell (offgoing nurse). Report included the following information SBAR, Kardex, ED Summary, MAR, Accordion, and Med Rec Status.

## 2022-09-18 NOTE — PROGRESS NOTES
Andi Antony [facility] 79  [address] [facility], [address] [place] [address]  [phone]      Hospitalist Progress Note      NAME: Ruddy Srinivasan   :  1945  MRM:  [id number]    Date/Time of service: 2022  10:06 AM       Subjective:     Chief Complaint:  Patient was personally seen and examined by me during this time period. Chart reviewed. There is no new complaint. Pending discharge to SNF Vs rehab. Objective:       Vitals:       Last 24hrs VS reviewed since prior progress note.  Most recent are:    Visit Vitals  /68 (BP 1 Location: Right upper arm, BP Patient Position: At rest)   Pulse 91   Temp 98.2 °F (36.8 °C)   Resp 20   Ht 5' 4\" (1.626 m)   Wt 54.8 kg (120 lb 14.4 oz)   SpO2 96%   BMI 20.75 kg/m²     SpO2 Readings from Last 6 Encounters:   22 96%   17 97%   07/06/15 95%        No intake or output data in the 24 hours ending 22 1233       Exam:     Physical Exam:    Gen:  frail, ill-appearing, NAD  HEENT:  Pink conjunctivae, PERRL, hearing intact to voice, moist mucous membranes  Neck:  Supple, without masses, thyroid non-tender  Resp:  No accessory muscle use, clear breath sounds without wheezes rales or rhonchi  Card:  No murmurs, normal S1, S2 without thrills, bruits or peripheral edema  Abd:  Soft, non-tender, non-distended, normoactive bowel sounds are present  Musc:  No cyanosis or clubbing  Skin:  No rashes  Neuro:  Cranial nerves 3-12 are grossly intact, generalized weakness, follows commands appropriately  Psych:  Good insight, oriented to person, place and time, alert    Medications Reviewed: (see below)    Lab Data Reviewed: (see below)    ______________________________________________________________________    Medications:     Current Facility-Administered Medications   Medication Dose Route Frequency    thiamine mononitrate (B-1) tablet 100 mg  100 mg Oral DAILY    miconazole (MICOTIN) 2 % powder   Topical BID    therapeutic multivitamin [facility]) tablet 1 Tablet  1 Tablet Oral DAILY    folic acid (FOLVITE) tablet 1 mg  1 mg Oral DAILY    enoxaparin (LOVENOX) injection 40 mg  40 mg SubCUTAneous Q24H    aspirin chewable tablet 81 mg  81 mg Oral DAILY    lisinopriL (PRINIVIL, ZESTRIL) tablet 20 mg  20 mg Oral DAILY    loperamide (IMODIUM) capsule 2 mg  2 mg Oral Q4H PRN    sodium chloride (NS) flush 5-10 mL  5-10 mL IntraVENous PRN    ALPRAZolam (XANAX) tablet 0.5 mg  0.5 mg Oral QHS PRN    amLODIPine (NORVASC) tablet 5 mg  5 mg Oral DAILY    pantoprazole (PROTONIX) tablet 40 mg  40 mg Oral ACB    sertraline (ZOLOFT) tablet 100 mg  100 mg Oral DAILY    sodium chloride (NS) flush 5-40 mL  5-40 mL IntraVENous Q8H    sodium chloride (NS) flush 5-40 mL  5-40 mL IntraVENous PRN    acetaminophen (TYLENOL) tablet 650 mg  650 mg Oral Q6H PRN    Or    acetaminophen (TYLENOL) suppository 650 mg  650 mg Rectal Q6H PRN    polyethylene glycol (MIRALAX) packet 17 g  17 g Oral DAILY PRN    ondansetron (ZOFRAN ODT) tablet 4 mg  4 mg Oral Q8H PRN    Or    ondansetron (ZOFRAN) injection 4 mg  4 mg IntraVENous Q6H PRN          Lab Review:     No results for input(s): WBC, HGB, HCT, PLT, HGBEXT, HCTEXT, PLTEXT, HGBEXT, HCTEXT, PLTEXT in the last 72 hours. No results for input(s): NA, K, CL, CO2, GLU, BUN, CREA, CA, MG, PHOS, ALB, TBIL, TBILI, ALT, INR, INREXT, INREXT in the last 72 hours. No lab exists for component: SGOT    No results found for: GLUCPOC       Assessment / Plan:     64 yo hx of HTN, depression, etoh abuse, presented w/ sepsis, ataxia, UTI, E. Coli bacteremia    1) Sepsis/UTI/E. Coli bacteremia: sepsis POA, now resolved. ID recommended oral Cipro till 09/17. Completed antibiotics duration    2) Weakness/ataxia: slowly improving. Likely from chronic etoh abuse. Head MRI was neg for CVA. Neuro was following. Cont PT/OT. Awaiting rehab (insurance auth denied)  Awaiting peer to peer.   PT recommending SNF vs HHPT  Patient wants to be discharged to a SNF    3) Severe stenosis of right M1: incidental findings on head MRA.   Cont ASA per neuro     4) HTN: cont norvasc, lisinopril     5) Depression: cont zoloft, xanax     6) Etoh abuse: no withdrawal.  Cont vitamins    Total time spent with patient care: 20 Minutes **I personally saw and examined the patient during this time period**                 Care Plan discussed with: Patient, nursing, CM, PT/OT    Discussed:  care plan    Prophylaxis:  Lovenox    Disposition:  Pending SNF placement           ___________________________________________________    Attending Physician: Carlita Hamm MD

## 2022-09-18 NOTE — PROGRESS NOTES
9/18/2022  3:23 PM  Care Management Progress Note      ICD-10-CM ICD-9-CM    1. Sepsis with acute renal failure without septic shock, due to unspecified organism, unspecified [facility]  A41.9 038.9     R65.20 995.92     N17.9 584.9       2. ALBINA (acute kidney injury) ([facility]  N17.9 584.9       3. Elevated troponin  R77.8 790.6       4. Leukocytosis, unspecified type  D72.829 288.60       5. Nausea and vomiting in adult  R11.2 787.01       6. Suprapubic abdominal pain  R10.2 789.09       7. Pyelonephritis  N12 590.80       8. Bacteremia due to Escherichia coli  R78.81 790.7     B96.20 041.49       9. Gait disturbance [R26.9 (ICD-10-CM)]  R26.9 781.2           RUR:  9%  Risk Level: [x]Low []Moderate []High  Value-based purchasing: [] Yes [x] No  Bundle patient: [] Yes [x] No   Specify:     Transition of care plan:  Discharge pending placement arrangements. TBD - CM notified attending did not get a returned call from [facility] re [id number] for SNF denial. CM attempted to contact insurance company 2x; however, was unable to speak to anyone. CM requested a returned p/c through the automated system; however, did not get a response. Pt's  wishes to call [facility] on Monday to appeal as well. [facility] following if pt is discharged home. Outpatient follow-up. Pt's family to transport.

## 2022-09-18 NOTE — PROGRESS NOTES
Problem: Pressure Injury - Risk of  Goal: *Prevention of pressure injury  Description: Document Asad Scale and appropriate interventions in the flowsheet. Outcome: Progressing Towards Goal  Note: Pressure Injury Interventions:  Sensory Interventions: Maintain/enhance activity level    Moisture Interventions: Check for incontinence Q2 hours and as needed, Offer toileting Q_hr, Absorbent underpads, Assess need for specialty bed    Activity Interventions: PT/OT evaluation, Increase time out of bed, Assess need for specialty bed    Mobility Interventions: PT/OT evaluation, Turn and reposition approx. every two hours(pillow and wedges), Pressure redistribution bed/mattress (bed type)    Nutrition Interventions: Document food/fluid/supplement intake    Friction and Shear Interventions: HOB 30 degrees or less, Transferring/repositioning devices, Lift sheet, Apply protective barrier, creams and emollients                Problem: Patient Education: Go to Patient Education Activity  Goal: Patient/Family Education  Outcome: Progressing Towards Goal     Problem: Falls - Risk of  Goal: *Absence of Falls  Description: Document Michael Fall Risk and appropriate interventions in the flowsheet.   Outcome: Progressing Towards Goal  Note: Fall Risk Interventions:  Mobility Interventions: Bed/chair exit alarm, Patient to call before getting OOB, PT Consult for mobility concerns, Utilize walker, cane, or other assistive device, Utilize gait belt for transfers/ambulation    Mentation Interventions: Adequate sleep, hydration, pain control, Bed/chair exit alarm    Medication Interventions: Patient to call before getting OOB, Teach patient to arise slowly    Elimination Interventions: Bed/chair exit alarm, Call light in reach, Patient to call for help with toileting needs, Toileting schedule/hourly rounds    History of Falls Interventions: Bed/chair exit alarm, Door open when patient unattended         Problem: Patient Education: Go to Patient Education Activity  Goal: Patient/Family Education  Outcome: Progressing Towards Goal     Problem: Pain  Goal: *Control of Pain  Outcome: Progressing Towards Goal     Problem: Fluid Volume - Risk of, Imbalanced  Goal: *Balanced intake and output  Outcome: Progressing Towards Goal     Problem: Patient Education: Go to Patient Education Activity  Goal: Patient/Family Education  Outcome: Progressing Towards Goal     Problem: Patient Education: Go to Patient Education Activity  Goal: Patient/Family Education  Outcome: Progressing Towards Goal     Problem: Patient Education: Go to Patient Education Activity  Goal: Patient/Family Education  Outcome: Progressing Towards Goal     Problem: Nutrition Deficit  Goal: *Optimize nutritional status  Outcome: Progressing Towards Goal

## 2022-09-19 VITALS
DIASTOLIC BLOOD PRESSURE: 61 MMHG | OXYGEN SATURATION: 96 % | HEIGHT: 64 IN | HEART RATE: 94 BPM | WEIGHT: 122.8 LBS | TEMPERATURE: 97.7 F | RESPIRATION RATE: 19 BRPM | BODY MASS INDEX: 20.96 KG/M2 | SYSTOLIC BLOOD PRESSURE: 99 MMHG

## 2022-09-19 PROCEDURE — 97530 THERAPEUTIC ACTIVITIES: CPT

## 2022-09-19 PROCEDURE — 97116 GAIT TRAINING THERAPY: CPT

## 2022-09-19 PROCEDURE — 74011250637 HC RX REV CODE- 250/637: Performed by: INTERNAL MEDICINE

## 2022-09-19 PROCEDURE — 74011250637 HC RX REV CODE- 250/637: Performed by: PSYCHIATRY & NEUROLOGY

## 2022-09-19 PROCEDURE — 74011000250 HC RX REV CODE- 250: Performed by: INTERNAL MEDICINE

## 2022-09-19 PROCEDURE — 74011250636 HC RX REV CODE- 250/636: Performed by: HOSPITALIST

## 2022-09-19 PROCEDURE — 97535 SELF CARE MNGMENT TRAINING: CPT

## 2022-09-19 PROCEDURE — 74011250637 HC RX REV CODE- 250/637: Performed by: HOSPITALIST

## 2022-09-19 RX ORDER — GUAIFENESIN 100 MG/5ML
81 LIQUID (ML) ORAL DAILY
Qty: 60 TABLET | Refills: 1 | Status: SHIPPED | OUTPATIENT
Start: 2022-09-20

## 2022-09-19 RX ADMIN — MICONAZOLE NITRATE 2 % TOPICAL POWDER: at 08:59

## 2022-09-19 RX ADMIN — ASPIRIN 81 MG: 81 TABLET, CHEWABLE ORAL at 08:58

## 2022-09-19 RX ADMIN — THIAMINE HCL TAB 100 MG 100 MG: 100 TAB at 08:58

## 2022-09-19 RX ADMIN — ENOXAPARIN SODIUM 40 MG: 100 INJECTION SUBCUTANEOUS at 08:57

## 2022-09-19 RX ADMIN — FOLIC ACID 1 MG: 1 TABLET ORAL at 08:58

## 2022-09-19 RX ADMIN — LISINOPRIL 20 MG: 20 TABLET ORAL at 08:59

## 2022-09-19 RX ADMIN — AMLODIPINE BESYLATE 5 MG: 5 TABLET ORAL at 08:59

## 2022-09-19 RX ADMIN — SERTRALINE HYDROCHLORIDE 100 MG: 50 TABLET ORAL at 08:58

## 2022-09-19 RX ADMIN — THERA TABS 1 TABLET: TAB at 08:57

## 2022-09-19 RX ADMIN — Medication 10 ML: at 06:51

## 2022-09-19 RX ADMIN — PANTOPRAZOLE SODIUM 40 MG: 40 TABLET, DELAYED RELEASE ORAL at 06:53

## 2022-09-19 NOTE — PROGRESS NOTES
09/19/22      Medicare pt has received Medicare 2nd IMM letter. Detailed information was given to patient about the letter but Declined to sign letter.

## 2022-09-19 NOTE — PROGRESS NOTES
Patient had completed her breakfast when SLP arrived. No signs of congestion. No documentation in chart of dysphagia in the las week. Appears to be tolerating diet. SLP will sign off.

## 2022-09-19 NOTE — PROGRESS NOTES
Problem: Self Care Deficits Care Plan (Adult)  Goal: *Acute Goals and Plan of Care (Insert Text)  Description: FUNCTIONAL STATUS PRIOR TO ADMISSION: per patient and her  via (phone call) patient has had a decline in her mobility over the last ~6 months, does not use adaptive equipment, however per her  she takes \"little steps\" when walking and he stated \"has a hard time walking crisply\" Patient's  reports episodes of legs not functioning at night and inability to move them and as day progresses she can move them again. Both report decreased balance over the last mew months. Patient was active in past and a . HOME SUPPORT: The patient lived with her  but did not require assist. However recent increase in falls and decreased balance    Occupational Therapy Goals  Weekly re-assessment, goals upgraded   1. Patient will perform grooming in stand with modified independence within 7 day(s). 2.  Patient will perform bathing with supervision/set-up within 7 day(s). 3.  Patient will perform lower body dressing with supervision/set-up within 7 day(s). 4.  Patient will perform toilet transfers with modified independence within 7 day(s). 5.  Patient will perform all aspects of toileting with modified independence within 7 day(s). 6.  Patient will complete bathroom mobility at RW level with distant supervision and no cues required for safe walker mgmt within 7 days. Initiated 9/10/2022- reviewed 9/16/22  1. Patient will perform grooming with supervision/set-up seated unsupported within 7 day(s). - met, upgrade to modified independence   2. Patient will maintain static sitting balance with UE support at midline with min multi-modal cues > or = 1 minute within 7 day(s). - met  3. Patient will maintain static standing balance with bilateral UE support > or = 1 minute with moderate assist of 1 and min cues within 7 day(s)- met  4.   Patient will perform squat pivot transfer to bedside commode with moderate assist of 1 within 7 day(s). - met   5. Patient will position LE's in bridge and elevate hips with verbal cue x's 3 reps to assist with repositioning and toileting in bed within 7 day(s). - met  6. Patient will perform cervical ROM and bilateral UE AROM with min cues within 7 day(s). Outcome: Progressing Towards Goal     OCCUPATIONAL THERAPY TREATMENT  Patient: Ulises Toledo (89 y.o. female)  Date: 9/19/2022  Diagnosis: UTI (urinary tract infection) [N39.0] <principal problem not specified>      Precautions: Fall, Skin  Chart, occupational therapy assessment, plan of care, and goals were reviewed. ASSESSMENT  Patient continues with skilled OT services and is progressing towards goals. Patient continues to present with decreased activity tolerance, impaired mobility, decreased strength, and impaired coordination impacting overall function and independence and safety with ADLs and mobility. Pt today received in bed with supportive  present. She performs full body dressing with overall set up to CGA, requiring up to min A to manage laces on tennis shoes. She performs tailor sit without difficulty and benefits from use of RW during standing portions of tasks. In bathroom, pt mobilizes with overall SBA/CGA with RW and manages toileting tasks with supervision/CGA. She demonstrates slow pace but offers excellent efforts throughout session. She is left with PT to perform stair training at end of session. Current Level of Function Impacting Discharge (ADLs): up to min A for ADLs    Other factors to consider for discharge: recent decline in function         PLAN :  Patient continues to benefit from skilled intervention to address the above impairments. Continue treatment per established plan of care to address goals.     Recommend with staff: OOB to chair for all meals; mobility to bathroom vs. BSC for toileting; ADLs with support/assistance as needed    Recommend next OT session: standing tasks at sink    Recommendation for discharge: (in order for the patient to meet his/her long term goals)  Occupational therapy at least 2 days/week in the home AND ensure assist and/or supervision for safety with ADLs and mobility (especially stairs); Per CM, SNF has been denied. This discharge recommendation:  Has been made in collaboration with the attending provider and/or case management    IF patient discharges home will need the following DME: rolling walker, shower chair       SUBJECTIVE:   Patient stated I'm frustrated.     OBJECTIVE DATA SUMMARY:   Cognitive/Behavioral Status:  Neurologic State: Alert  Orientation Level: Oriented X4  Cognition: Appropriate decision making; Appropriate for age attention/concentration; Appropriate safety awareness; Follows commands  Perception: Appears intact  Perseveration: No perseveration noted  Safety/Judgement: Awareness of environment; Fall prevention    Functional Mobility and Transfers for ADLs:  Bed Mobility:  Supine to Sit: Stand-by assistance  Scooting: Stand-by assistance    Transfers:  Sit to Stand: Stand-by assistance;Contact guard assistance  Functional Transfers  Bathroom Mobility: Stand-by assistance;Contact guard assistance  Toilet Transfer : Stand-by assistance;Contact guard assistance  Cues: Verbal cues provided  Adaptive Equipment: Grab bars; Walker (comment)  Bed to Chair: Stand-by assistance;Contact guard assistance    Balance:  Sitting: Intact  Standing: With support  Standing - Static: Good  Standing - Dynamic : Fair    ADL Intervention:  Upper Body Dressing Assistance  Pullover Shirt: Set-up (seated EOB)    Lower Body Dressing Assistance  Dressing Assistance: Stand-by assistance;Contact guard assistance  Protective Undergarmet: Contact guard assistance  Socks: Set-up; Supervision  Shoes with Cloth Laces: Set-up; Minimum assistance (min A to tie laces; able to don shoes with set up)  Leg Crossed Method Used: Yes  Position Performed: Seated edge of bed  Cues: Etta Grajeda    Toileting  Toileting Assistance: Supervision;Stand-by assistance  Bladder Hygiene: Modified independent  Clothing Management: Contact guard assistance  Cues: Verbal cues provided  Adaptive Equipment: Grab bars; Walker    Cognitive Retraining  Safety/Judgement: Awareness of environment; Fall prevention    Pain:  Pt reporting minimal pain    Activity Tolerance:   Fair and requires rest breaks    After treatment patient left in no apparent distress: Up with PT    COMMUNICATION/COLLABORATION:   The patients plan of care was discussed with: Physical therapist and Registered nurse.      Heidi Stapleton OT  Time Calculation: 24 mins

## 2022-09-19 NOTE — PROGRESS NOTES
Problem: Mobility Impaired (Adult and Pediatric)  Goal: *Acute Goals and Plan of Care (Insert Text)  Description: FUNCTIONAL STATUS PRIOR TO ADMISSION: Pt reports impaired balance and several falls in recent months. She reports ambulating without assistive device and frequently feels as though her [place] is off\" but is unable to offer further description. Some pt reports are inconsistent over course of encounter, including duration of R proximal leg pain. Per OT following OT conversation with spouse pt has experienced limited mobility x 2-3 years with increased decline over past 6 months. She ambulates with shuffling gait and no assistive device. She reportedly experiences episodes of limited gross motor abilities and inability to ambulate with increased difficulty in mornings vs afternoons. HOME SUPPORT PRIOR TO ADMISSION: The patient lived with spouse who reports concern regarding ability to care for patient at home with current impairments. Physical Therapy Goals  Revised 9/19/22  1. Patient will move from supine to sit and sit to supine  in bed with modified independence within 7 day(s). 2.  Patient will transfer from bed to chair and chair to bed with modified independence using the least restrictive device within 7 day(s). 3.  Patient will perform sit to stand with modified independence within 7 day(s). 4.  Patient will ambulate with modified independence for 150 feet with the least restrictive device within 7 day(s). 5.  Patient will ascend/descend 4 stairs with b/l handrail(s) with modified independence within 7 day(s). Physical Therapy Goals  Initiated 9/10/2022  1. Patient will move from supine to sit and sit to supine  in bed with moderate assistance  within 7 day(s). 2.  Patient will transfer from bed to chair and chair to bed with moderate assistance  using the least restrictive device within 7 day(s).   3.  Patient will perform sit to stand with moderate assistance 3/3 trials within 7 day(s). 4.  Patient will ambulate with moderate assistance  for 10 feet with the least restrictive device within 7 day(s). 5.  Patient will perform static and dynamic sitting activities x 10 mins with supervision within 7 days. 9/19/2022 1205 by Volney Boast  Outcome: Progressing Towards Goal  9/19/2022 1202 by Volney Boast  Outcome: Not Met       PHYSICAL THERAPY TREATMENT: WEEKLY REASSESSMENT  Patient: Ponce Barlow (75 y.o. female)  Date: 9/19/2022  Primary Diagnosis: UTI (urinary tract infection) [N39.0]       Precautions:   Fall, Skin      ASSESSMENT  Based on current observations, pt continues to present with deficits in generalized strength/AROM, static/dynamic standing balance, functional activity tolerance, and coordination,  impacting overall performance of functional transfers/mobility. Pt requires SBA for supine>sit transfers, SBA/CGA for STS and bed<>chair transfers, demonstrates fair dynamic standing balance, able to perform lower body dressing in standing with OT. Pt able to ambulate 150' (75' +75' with Rw, required 1 sitting rest break), CGA, needs cues to increase step length. Pt able to ascend/ descend 4 stairs with UE support on b/l HR, CGA. Overall, pt tolerates session well today, making good progress towards the goals and would benefit from continued skilled PT services to address noted deficits and maximize IND/safety with functional transfers/mobility. PT goals and POC reviewed and updated to reflect current progress. Current Level of Function Impacting Discharge (mobility/balance): Pt requires CGA for transfers/mobility with RW    Functional Outcome Measure: The patient scored 45 on the Barthel Index outcome measure which is indicative of partial dependence for self care. Other factors to consider for discharge: time since onset, decline from functional baseline         PLAN :  Goals have been updated based on progression since last assessment.   Patient continues to benefit from skilled intervention to address the above impairments. Recommendations and Planned Interventions: bed mobility training, transfer training, gait training, therapeutic exercises, neuromuscular re-education, patient and family training/education, and therapeutic activities      Frequency/Duration: Patient will be followed by physical therapy:  5 times a week to address goals. Recommendation for discharge: (in order for the patient to meet his/her long term goals)  Physical therapy at least 2 days/week in the home AND ensure assist and/or supervision for safety with functional mobility  . Per CM , SNF has been denied. This discharge recommendation:  Has been made in collaboration with the attending provider and/or case management    IF patient discharges home will need the following DME: rolling walker         SUBJECTIVE:   Patient stated My butt is hurting    OBJECTIVE DATA SUMMARY:   HISTORY:    Past Medical History:   Diagnosis Date    Depression     Hypertension    No past surgical history on file. Personal factors and/or comorbidities impacting plan of care:     Home Situation  Home Environment: Private residence  # Steps to Enter: 4  One/Two Story Residence: One story  Living Alone: No  Support Systems: Spouse/Significant Other, Child(helene)  Patient Expects to be Discharged to[de-identified] Unable to determine at this time  Current DME Used/Available at Home: Other (comment) (walking stick for hiking)    EXAMINATION/PRESENTATION/DECISION MAKING:   Critical Behavior:  Neurologic State: Alert  Orientation Level: Oriented X4  Cognition: Follows commands  Safety/Judgement: Awareness of environment  Hearing:   Auditory  Auditory Impairment: None  Skin:  intact where exposed    Range Of Motion:   Generally decreased, functional    Strength:     Generally decreased, functional    Tone & Sensation:   Tone: Normal    Functional Mobility:  Bed Mobility:     Supine to Sit: Stand-by assistance Scooting: Stand-by assistance  Transfers:  Sit to Stand: Stand-by assistance;Contact guard assistance  Stand to Sit: Stand-by assistance;Contact guard assistance        Bed to Chair: Stand-by assistance;Contact guard assistance    Balance:   Sitting: Intact; Without support  Standing: Impaired; With support  Standing - Static: Good  Standing - Dynamic : Fair;Occasional  Ambulation/Gait Training:  Distance (ft): 150 Feet (ft) (75' + 75' ( requires 1 sitting rest break))  Assistive Device: Walker, rolling;Gait belt  Ambulation - Level of Assistance: Contact guard assistance    Gait Abnormalities: Decreased step clearance;Shuffling gait    Speed/Valerie: Pace decreased (<100 feet/min)  Step Length: Right shortened;Left shortened     Stairs:  Number of Stairs Trained: 4  Stairs - Level of Assistance: Contact guard assistance   Rail Use: Both    Functional Measure:  Barthel Index:    Bathin  Bladder: 5  Bowels: 5  Groomin  Dressin  Feeding: 10  Mobility: 0  Stairs: 5  Toilet Use: 5  Transfer (Bed to Chair and Back): 10  Total: 45/100       The Barthel ADL Index: Guidelines  1. The index should be used as a record of what a patient does, not as a record of what a patient could do. 2. The main aim is to establish degree of independence from any help, physical or verbal, however minor and for whatever reason. 3. The need for supervision renders the patient not independent. 4. A patient's performance should be established using the best available evidence. Asking the patient, friends/relatives and nurses are the usual sources, but direct observation and common sense are also important. However direct testing is not needed. 5. Usually the patient's performance over the preceding 24-48 hours is important, but occasionally longer periods will be relevant. 6. Middle categories imply that the patient supplies over 50 per cent of the effort. 7. Use of aids to be independent is allowed.     Barby Hutchison, Barthel, VIOLETTA (1965). Functional evaluation: the Barthel Index. [facility] (14)2. PHIL Aguilar, Elvia Fuentes., Tawanna Ormond., Eze Rice, [address] (1999). Measuring the change indisability after inpatient rehabilitation; comparison of the responsiveness of the Barthel Index and Functional [facility] Measure. Journal of Neurology, Neurosurgery, and Psychiatry, 66(4), 541-232. NICKOLAS Ramirez, RIN Samaniego, & Sandra Ordoñez M.A. (2004.) Assessment of post-stroke quality of life in cost-effectiveness studies: The usefulness of the Barthel Index and the EuroQoL-5D. Quality of Life Research, 13, 427-43        Pain Rating:  Pain at buttocks - 4/10    Activity Tolerance:   Fair  Please refer to the flowsheet for vital signs taken during this treatment. After treatment patient left in no apparent distress:   Sitting in chair, Call bell within reach, and Bed / chair alarm activated    COMMUNICATION/EDUCATION:   The patients plan of care was discussed with: Occupational therapist and Registered nurse. Fall prevention education was provided and the patient/caregiver indicated understanding. and Patient/family agree to work toward stated goals and plan of care. Partial PT/OT session occurred together for increased pt's safety and maximum functional outcome.    Thank you for this referral.  mIer Best   Time Calculation: 39 mins

## 2022-09-19 NOTE — PROGRESS NOTES
Attempted to schedule follow up appointment with PCP  Stanislaw Victoria MD recording indicated that office is closed for lunch will call back later.

## 2022-09-19 NOTE — PROGRESS NOTES
Hospital Follow Up with PCP  Sue Haynes MD for 09/27/2022 at 10:40am. Patients normal provider Dr Zaida Sanabria did not have any available appointments until October.

## 2022-09-19 NOTE — PROGRESS NOTES
Problem: Pressure Injury - Risk of  Goal: *Prevention of pressure injury  Description: Document Asad Scale and appropriate interventions in the flowsheet. Outcome: Progressing Towards Goal  Note: Pressure Injury Interventions:  Sensory Interventions: Check visual cues for pain, Minimize linen layers    Moisture Interventions: Check for incontinence Q2 hours and as needed, Offer toileting Q_hr, Absorbent underpads, Assess need for specialty bed    Activity Interventions: PT/OT evaluation, Increase time out of bed, Assess need for specialty bed    Mobility Interventions: PT/OT evaluation, Turn and reposition approx. every two hours(pillow and wedges), Pressure redistribution bed/mattress (bed type)    Nutrition Interventions: Document food/fluid/supplement intake    Friction and Shear Interventions: HOB 30 degrees or less, Transferring/repositioning devices, Lift sheet, Apply protective barrier, creams and emollients                Problem: Patient Education: Go to Patient Education Activity  Goal: Patient/Family Education  Outcome: Progressing Towards Goal     Problem: Falls - Risk of  Goal: *Absence of Falls  Description: Document Michael Fall Risk and appropriate interventions in the flowsheet.   Outcome: Progressing Towards Goal  Note: Fall Risk Interventions:  Mobility Interventions: Bed/chair exit alarm, Patient to call before getting OOB, PT Consult for mobility concerns, Utilize walker, cane, or other assistive device, Utilize gait belt for transfers/ambulation    Mentation Interventions: Adequate sleep, hydration, pain control, Bed/chair exit alarm    Medication Interventions: Patient to call before getting OOB, Teach patient to arise slowly    Elimination Interventions: Bed/chair exit alarm, Call light in reach, Patient to call for help with toileting needs, Toileting schedule/hourly rounds    History of Falls Interventions: Bed/chair exit alarm, Door open when patient unattended         Problem: Patient Education: Go to Patient Education Activity  Goal: Patient/Family Education  Outcome: Progressing Towards Goal     Problem: Pain  Goal: *Control of Pain  Outcome: Progressing Towards Goal     Problem: Fluid Volume - Risk of, Imbalanced  Goal: *Balanced intake and output  Outcome: Progressing Towards Goal     Problem: Patient Education: Go to Patient Education Activity  Goal: Patient/Family Education  Outcome: Progressing Towards Goal     Problem: Patient Education: Go to Patient Education Activity  Goal: Patient/Family Education  Outcome: Progressing Towards Goal     Problem: Patient Education: Go to Patient Education Activity  Goal: Patient/Family Education  Outcome: Progressing Towards Goal     Problem: Nutrition Deficit  Goal: *Optimize nutritional status  Outcome: Progressing Towards Goal

## 2022-09-19 NOTE — DISCHARGE SUMMARY
Discharge Summary       PATIENT ID: Anat Rocha  MRN: [id number]   YOB: 1945    DATE OF ADMISSION: 9/5/2022  5:46 PM    DATE OF DISCHARGE: 9/19/2022   PRIMARY CARE PROVIDER: Dalia Garcia MD       DISCHARGING PHYSICIAN: Nica Mayo MD    To contact this individual call [phone] and ask the  to page. If unavailable ask to be transferred the Adult Hospitalist Department. CONSULTATIONS: IP CONSULT TO INFECTIOUS DISEASES  IP CONSULT TO UROLOGY  IP CONSULT TO NEUROLOGY    PROCEDURES/SURGERIES: * No surgery found *    ADMITTING [address] [place] [address] COURSE:   Except from H&P  The patient is a 64 y/o C F w/ PMH anxiety/depression and recurrent UTI who is brought in today as she feels horrible for the past two days. She notes of increased night sweats, chills, nausea and vomiting which are usually consistent w/ her long-standing hx of UTI. She notes of dysuria x10 days w/o any hematuria. She denies any flank pain and her abdominal pain has been chronic w/o any acute issues. Today she has been unable to ambulate which results when she has severe UTI. She denies any fever, chest pain, palpitations, cough, wheezing or dyspnea. Her most recent colonoscopy was 6 weeks ago w/ Dr. Doran. A/P    66 yo hx of HTN, depression, etoh abuse, presented w/ sepsis, ataxia, UTI, E. Coli bacteremia     1) Sepsis/UTI/E. Coli bacteremia: sepsis POA, now resolved. ID recommended oral Cipro till 09/17. Completed antibiotics duration  Sepsis syndrome resolved     2) Weakness/ataxia: slowly improving. Likely from chronic etoh abuse  Head MRI was neg for CVA. Neuro was following. Cont PT/OT. Awaiting rehab (insurance auth denied)  Insurance upheld denial for 2 nd time  Patient will be discharged home with home health PT     3) Severe stenosis of right M1: incidental findings on head MRA. Cont ASA per neuro      4) HTN: cont norvasc, lisinopril.  BP is stable     5) Depression: cont zoloft, xanax      6) Etoh abuse: no withdrawal.  Cont vitamins    Disposition: Patient is hemodynamically stable and has improved. D/c to home with home health care PT    [address] [address] / PLAN:       As above       PENDING TEST RESULTS:   At the time of discharge the following test results are still pending:     FOLLOW UP APPOINTMENTS:    Follow-up Information       Follow up With Specialties Details Why Contact Info    [address] [facility] [address]  [phone]    Carlitos Pal MD Family Medicine   [facility] [address] [phone]               ADDITIONAL CARE RECOMMENDATIONS:     DIET: Cardiac Diet    ACTIVITY: Activity as tolerated    WOUND CARE:     EQUIPMENT needed:       DISCHARGE MEDICATIONS:  Current Discharge Medication List        START taking these medications    Details   aspirin 81 mg chewable tablet Take 1 Tablet by mouth daily. Qty: 60 Tablet, Refills: 1  Start date: 9/20/2022           CONTINUE these medications which have NOT CHANGED    Details   ondansetron (ZOFRAN ODT) 4 mg disintegrating tablet       lisinopril (PRINIVIL, ZESTRIL) 20 mg tablet Take  by mouth daily. amLODIPine (NORVASC) 5 mg tablet Take 5 mg by mouth daily. omeprazole (PRILOSEC) 20 mg capsule Take 20 mg by mouth daily. ALPRAZolam (XANAX) 0.5 mg tablet Take  by mouth. sertraline (ZOLOFT) 100 mg tablet Take  by mouth daily. STOP taking these medications       metoclopramide HCl (REGLAN) 5 mg tablet Comments:   Reason for Stopping:                 NOTIFY YOUR PHYSICIAN FOR ANY OF THE FOLLOWING:   Fever over 101 degrees for 24 hours. Chest pain, shortness of breath, fever, chills, nausea, vomiting, diarrhea, change in mentation, falling, weakness, bleeding. Severe pain or pain not relieved by medications. Or, any other signs or symptoms that you may have questions about.     DISPOSITION: Home With:   OT  PT x HH  RN       Long term SNF/Inpatient Rehab    Independent/assisted living    Hospice    Other:       PATIENT CONDITION AT DISCHARGE:     Functional status    Poor    x Deconditioned     Independent      Cognition   x  Lucid     Forgetful     Dementia      Catheters/lines (plus indication)    Keenan     PICC     PEG    x None      Code status   x  Full code     DNR      PHYSICAL EXAMINATION AT DISCHARGE:   Refer to Progress Note    Gen:  frail, NAD  HEENT:  Pink conjunctivae, PERRL, hearing intact to voice, moist mucous membranes  Neck:  Supple, without masses, thyroid non-tender  Resp:  No accessory muscle use, clear breath sounds without wheezes rales or rhonchi  Card:  No murmurs, normal S1, S2 without thrills, bruits or peripheral edema  Abd:  Soft, non-tender, non-distended, normoactive bowel sounds are present  Musc:  No cyanosis or clubbing  Skin:  No rashes  Neuro:  Cranial nerves 3-12 are grossly intact, generalized weakness, follows commands appropriately  Psych:  Good insight, oriented to person, place and time, alert       CHRONIC MEDICAL DIAGNOSES:  Problem List as of 9/19/2022 Date Reviewed: 2/8/2017            Codes Class Noted - Resolved    UTI (urinary tract infection) ICD-10-CM: N39.0  ICD-9-CM: 599.0  9/5/2022 - Present        Elevated liver enzymes ICD-10-CM: R74.8  ICD-9-CM: 790.5  7/6/2015 - Present        Frequent UTI ICD-10-CM: N39.0  ICD-9-CM: 599.0  7/6/2015 - Present        Depression ICD-10-CM: F32. A  ICD-9-CM: 727  7/6/2015 - Present        Hypertension ICD-10-CM: I10  ICD-9-CM: 401.9  7/6/2015 - Present        IBS (irritable bowel syndrome) ICD-10-CM: K58.9  ICD-9-CM: 564.1  7/6/2015 - Present        S/P partial colectomy ICD-10-CM: Z90.49  ICD-9-CM: V45.89  7/6/2015 - Present    Overview Signed 7/6/2015 10:50 AM by MY Ortega     diverticulosis                Greater than 30 minutes were spent with the patient on counseling and coordination of care    Signed:   Juliana Garay MD  9/19/2022  10:14 AM

## 2022-09-19 NOTE — PROGRESS NOTES
9/19/2022  1:51 PM  Care Management Progress Note      ICD-10-CM ICD-9-CM    1. Sepsis with acute renal failure without septic shock, due to unspecified organism, unspecified acute renal failure type ([facility] [address]  A41.9 038.9     R65.20 995.92     N17.9 584.9       2. ALBINA (acute kidney injury) ([facility] [address]  N17.9 584.9       3. Elevated troponin  R77.8 790.6       4. Leukocytosis, unspecified type  D72.829 288.60       5. Nausea and vomiting in adult  R11.2 787.01       6. Suprapubic abdominal pain  R10.2 789.09       7. Pyelonephritis  N12 590.80       8. Bacteremia due to Escherichia coli  R78.81 790.7     B96.20 041.49       9. Gait disturbance [R26.9 (ICD-10-CM)]  R26.9 781.2           RUR:  8%  Risk Level: [x]Low []Moderate []High  Value-based purchasing: [] Yes [x] No  Bundle patient: [] Yes [x] No   Specify:     Transition of care plan:  Discharge order submitted. Pt has medically cleared to CT home. Home with [facility], and DC clinicals were attached in [employer]. CM notified that denial for SNF was upheld. Pt notified. CM received acceptance from [facility] for RW. RW delivered, and invoice attached in AllScripts. Outpatient follow-up. Hospital to Home Wheelchair Transport arranged for 2:30 PM (per pt's request). Pt agreeable to pay the approx $75 transport fee. Care Management Interventions  PCP Verified by CM: Yes  Mode of Transport at Discharge:  Other (see comment) (Family will transport at [facility])  Transition of Care Consult (CM Consult): Discharge Planning  Discharge Durable Medical Equipment: No  Physical Therapy Consult: No  Occupational Therapy Consult: No  Speech Therapy Consult: No  Support Systems: Spouse/Significant Other, Child(helene)  Confirm Follow Up Transport: Family  Discharge Location  Patient Expects to be Discharged to[de-identified] Home with home health

## 2022-09-19 NOTE — DISCHARGE INSTRUCTIONS
Discharge Instructions       PATIENT ID: Silvino Linares  MRN: [id number]   YOB: 1945    DATE OF ADMISSION: [unfilled]    DATE OF DISCHARGE: 9/19/2022    PRIMARY CARE PROVIDER: @PCP@       DISCHARGING PHYSICIAN: Juliana Garay MD    To contact this individual call [phone] and ask the  to page. If unavailable ask to be transferred the Adult Hospitalist Department. DISCHARGE DIAGNOSES UTI- resolved, Sepsis- resolved    CONSULTATIONS: [unfilled]    PROCEDURES/SURGERIES: * No surgery found *    PENDING TEST RESULTS:   At the time of discharge the following test results are still pending:     [facility]MENTS:   [facility]     ADDITIONAL CARE RECOMMENDATIONS:     DIET: Cardiac Diet    ACTIVITY: Activity as tolerated    WOUND CARE:     EQUIPMENT needed:       DISCHARGE MEDICATIONS:   See Medication Reconciliation Form    It is important that you take the medication exactly as they are prescribed. Keep your medication in the bottles provided by the pharmacist and keep a list of the medication names, dosages, and times to be taken in your wallet. Do not take other medications without consulting your doctor. NOTIFY YOUR PHYSICIAN FOR ANY OF THE FOLLOWING:   Fever over 101 degrees for 24 hours. Chest pain, shortness of breath, fever, chills, nausea, vomiting, diarrhea, change in mentation, falling, weakness, bleeding. Severe pain or pain not relieved by medications. Or, any other signs or symptoms that you may have questions about.       DISPOSITION:  x  Home With:   OT  PT  HHx  RN       SNF/Inpatient Rehab/LTAC    Independent/assisted living    Hospice    Other:     CDMP Checked:   Yes x       Signed:   Juliana Garay MD  9/19/2022  10:21 AM

## 2022-09-19 NOTE — PROGRESS NOTES
Nurse handed patient a copy of discharge instructions which have been read and explained to patient and family. New medications were read and explained to patient, patient verbalized understanding. Patient aware that prescriptions have been electronically sent to their pharmacy. Opportunity for questions and clarification offered. Removed patient's IV access with no complications. Vital signs stable. Patient sent with all belongings.

## 2022-09-20 NOTE — PROGRESS NOTES
Physician Progress Note      Abby Valencia  CSN #:                  [id number]  :                       1945  ADMIT DATE:       2022 5:46 PM  [address] DATE:        2022 2:28 PM  RESPONDING  PROVIDER #:        Brenden Lamar MD          QUERY Charly Stone Afternoon    This patient admitted for Sepsis. RD also consulted on 09/15/2022    If possible, please document in progress notes and discharge summary if you are evaluating and /or treating any of the following: The medical record reflects the following:  Risk Factors: Sepsis, ETOH, UTI, CKD  Clinical Indicators: Per RD assessemnt, Pt with mild body fat loss, mild muscle mass loss, clavicles, and scapula, \"findings consistent with sarcopenia\"  Treatment: RD consulted, Continue regl and cardiac AVILA, Provide Ensure ENlive BID to increase anneliese/protein, intake. Thank you  Mariella RidleyBSN,RN, CPHQ, CCDS, SMART  ___________________________________________________________    ASPEN Criteria:  https://aspenjournals. onlinelibrary. chiu. com/doi/full/10.1177/3855140668993724  Options provided:  -- Protein calorie malnutrition mild  -- Other - I will add my own diagnosis  -- Disagree - Not applicable / Not valid  -- Disagree - Clinically unable to determine / Unknown  -- Refer to Clinical Documentation Reviewer    PROVIDER RESPONSE TEXT:    This patient has mild protein calorie malnutrition.     Query created by: Durenda Goltz on 2022 3:33 PM      Electronically signed by:  Brenden Lamar MD 2022 12:55 PM

## 2022-10-05 PROBLEM — N39.0 UTI (URINARY TRACT INFECTION): Status: RESOLVED | Noted: 2022-09-05 | Resolved: 2022-10-05

## 2022-12-14 ENCOUNTER — HOSPITAL ENCOUNTER (EMERGENCY)
Age: 77
Discharge: HOME | End: 2022-12-14
Attending: STUDENT IN AN ORGANIZED HEALTH CARE EDUCATION/TRAINING PROGRAM
Payer: MEDICARE

## 2022-12-14 VITALS
WEIGHT: 118 LBS | RESPIRATION RATE: 16 BRPM | HEART RATE: 89 BPM | DIASTOLIC BLOOD PRESSURE: 84 MMHG | BODY MASS INDEX: 20.91 KG/M2 | HEIGHT: 63 IN | SYSTOLIC BLOOD PRESSURE: 182 MMHG | TEMPERATURE: 98.5 F | OXYGEN SATURATION: 100 %

## 2022-12-14 DIAGNOSIS — N39.0 URINARY TRACT INFECTION WITHOUT HEMATURIA, SITE UNSPECIFIED: Primary | ICD-10-CM

## 2022-12-14 LAB
ALBUMIN SERPL-MCNC: 4.6 G/DL (ref 3.5–5)
ALBUMIN/GLOB SERPL: 1.1 {RATIO} (ref 1.1–2.2)
ALP SERPL-CCNC: 72 U/L (ref 45–117)
ALT SERPL-CCNC: 22 U/L (ref 12–78)
ANION GAP SERPL CALC-SCNC: 8 MMOL/L (ref 5–15)
APPEARANCE UR: ABNORMAL
AST SERPL-CCNC: 12 U/L (ref 15–37)
BACTERIA URNS QL MICRO: ABNORMAL /HPF
BASOPHILS # BLD: 0 K/UL (ref 0–0.1)
BASOPHILS NFR BLD: 1 % (ref 0–1)
BILIRUB SERPL-MCNC: 0.8 MG/DL (ref 0.2–1)
BILIRUB UR QL: NEGATIVE
BUN SERPL-MCNC: 12 MG/DL (ref 6–20)
BUN/CREAT SERPL: 11 (ref 12–20)
CALCIUM SERPL-MCNC: 9.5 MG/DL (ref 8.5–10.1)
CHLORIDE SERPL-SCNC: 107 MMOL/L (ref 97–108)
CO2 SERPL-SCNC: 28 MMOL/L (ref 21–32)
COLOR UR: YELLOW
COMMENT, HOLDF: NORMAL
CREAT SERPL-MCNC: 1.12 MG/DL (ref 0.55–1.02)
DIFFERENTIAL METHOD BLD: ABNORMAL
EOSINOPHIL # BLD: 0.1 K/UL (ref 0–0.4)
EOSINOPHIL NFR BLD: 1 % (ref 0–7)
EPITH CASTS URNS QL MICRO: ABNORMAL /LPF
ERYTHROCYTE [DISTWIDTH] IN BLOOD BY AUTOMATED COUNT: 12.8 % (ref 11.5–14.5)
GLOBULIN SER CALC-MCNC: 4.3 G/DL (ref 2–4)
GLUCOSE SERPL-MCNC: 115 MG/DL (ref 65–100)
GLUCOSE UR STRIP.AUTO-MCNC: NEGATIVE MG/DL
HCT VFR BLD AUTO: 39.6 % (ref 35–47)
HGB BLD-MCNC: 13.1 G/DL (ref 11.5–16)
HGB UR QL STRIP: NEGATIVE
IMM GRANULOCYTES # BLD AUTO: 0 K/UL (ref 0–0.04)
IMM GRANULOCYTES NFR BLD AUTO: 0 % (ref 0–0.5)
KETONES UR QL STRIP.AUTO: NEGATIVE MG/DL
LEUKOCYTE ESTERASE UR QL STRIP.AUTO: ABNORMAL
LYMPHOCYTES # BLD: 1 K/UL (ref 0.8–3.5)
LYMPHOCYTES NFR BLD: 21 % (ref 12–49)
MCH RBC QN AUTO: 29.8 PG (ref 26–34)
MCHC RBC AUTO-ENTMCNC: 33.1 G/DL (ref 30–36.5)
MCV RBC AUTO: 90 FL (ref 80–99)
MONOCYTES # BLD: 0.3 K/UL (ref 0–1)
MONOCYTES NFR BLD: 6 % (ref 5–13)
NEUTS SEG # BLD: 3.4 K/UL (ref 1.8–8)
NEUTS SEG NFR BLD: 71 % (ref 32–75)
NITRITE UR QL STRIP.AUTO: POSITIVE
NRBC # BLD: 0 K/UL (ref 0–0.01)
NRBC BLD-RTO: 0 PER 100 WBC
PH UR STRIP: 7.5 [PH] (ref 5–8)
PLATELET # BLD AUTO: 115 K/UL (ref 150–400)
PMV BLD AUTO: 10.9 FL (ref 8.9–12.9)
POTASSIUM SERPL-SCNC: 4 MMOL/L (ref 3.5–5.1)
PROT SERPL-MCNC: 8.9 G/DL (ref 6.4–8.2)
PROT UR STRIP-MCNC: ABNORMAL MG/DL
RBC # BLD AUTO: 4.4 M/UL (ref 3.8–5.2)
RBC #/AREA URNS HPF: ABNORMAL /HPF (ref 0–5)
SAMPLES BEING HELD,HOLD: NORMAL
SODIUM SERPL-SCNC: 143 MMOL/L (ref 136–145)
SP GR UR REFRACTOMETRY: 1.01 (ref 1–1.03)
UA: UC IF INDICATED,UAUC: ABNORMAL
UROBILINOGEN UR QL STRIP.AUTO: 1 EU/DL (ref 0.2–1)
WBC # BLD AUTO: 4.7 K/UL (ref 3.6–11)
WBC URNS QL MICRO: ABNORMAL /HPF (ref 0–4)

## 2022-12-14 PROCEDURE — 85025 COMPLETE CBC W/AUTO DIFF WBC: CPT

## 2022-12-14 PROCEDURE — 80053 COMPREHEN METABOLIC PANEL: CPT

## 2022-12-14 PROCEDURE — 99283 EMERGENCY DEPT VISIT LOW MDM: CPT

## 2022-12-14 PROCEDURE — 81001 URINALYSIS AUTO W/SCOPE: CPT

## 2022-12-14 PROCEDURE — 36415 COLL VENOUS BLD VENIPUNCTURE: CPT

## 2022-12-15 NOTE — DISCHARGE INSTRUCTIONS
If you develop any body aches, worsening fevers, nausea, vomiting, or confusion please return immediately to the emergency room.

## 2022-12-15 NOTE — ED PROVIDER NOTES
79-year-old female with history of depression and hypertension presents to the ED with chief complaint of temperature of 99.5 at home today. Patient has known 15 mm right-sided kidney stone, was supposed to have surgery with urology today to remove it, but was concerned she may be developing a fever so canceled the surgery. Took Tylenol, last dose at 10 AM this morning. Patient does report some mild sore throat, nausea, painful urination. Patient was prescribed ciprofloxacin by urology yesterday for possible urinary tract infection. No fevers, chills, chest pain, difficulty breathing, bowel symptoms. No severe abdominal pain or flank pain. Urinary Pain   Associated symptoms include nausea. Pertinent negatives include no chills, no vomiting, no hematuria, no abdominal pain and no back pain. Fever   Associated symptoms include sore throat. Pertinent negatives include no chest pain, no diarrhea, no vomiting, no congestion, no headaches, no cough, no shortness of breath, no neck pain and no rash. Past Medical History:   Diagnosis Date    Depression     Hypertension        No past surgical history on file. No family history on file.     Social History     Socioeconomic History    Marital status:      Spouse name: Not on file    Number of children: Not on file    Years of education: Not on file    Highest education level: Not on file   Occupational History    Not on file   Tobacco Use    Smoking status: Never    Smokeless tobacco: Never   Substance and Sexual Activity    Alcohol use: No    Drug use: Not on file    Sexual activity: Not on file   Other Topics Concern    Not on file   Social History Narrative    Not on file     Social Determinants of Health     Financial Resource Strain: Not on file   Food Insecurity: Not on file   Transportation Needs: Not on file   Physical Activity: Not on file   Stress: Not on file   Social Connections: Not on file   Intimate Partner Violence: Not on file Housing Stability: Not on file         ALLERGIES: Sulfa (sulfonamide antibiotics), Flagyl [metronidazole], and Macrobid [nitrofurantoin monohyd/m-cryst]    Review of Systems   Constitutional:  Negative for chills and fever. HENT:  Positive for sore throat. Negative for congestion and rhinorrhea. Respiratory:  Negative for cough and shortness of breath. Cardiovascular:  Negative for chest pain and leg swelling. Gastrointestinal:  Positive for nausea. Negative for abdominal pain, constipation, diarrhea and vomiting. Genitourinary:  Positive for dysuria. Negative for difficulty urinating and hematuria. Musculoskeletal:  Negative for back pain and neck pain. Skin:  Negative for color change and rash. Neurological:  Negative for dizziness, weakness, light-headedness, numbness and headaches. Psychiatric/Behavioral:  Negative for agitation and confusion. Vitals:    12/14/22 2110   BP: (!) 182/84   Pulse: 89   Resp: 16   Temp: 98.5 °F (36.9 °C)   SpO2: 100%   Weight: 53.5 kg (118 lb)   Height: 5' 3\" (1.6 m)            Physical Exam  Constitutional:       General: She is not in acute distress. Appearance: She is well-developed. HENT:      Head: Normocephalic and atraumatic. Eyes:      General: No scleral icterus. Pupils: Pupils are equal, round, and reactive to light. Neck:      Trachea: No tracheal deviation. Cardiovascular:      Rate and Rhythm: Normal rate and regular rhythm. Heart sounds: No murmur heard. No friction rub. No gallop. Pulmonary:      Effort: Pulmonary effort is normal. No respiratory distress. Breath sounds: Normal breath sounds. No wheezing or rales. Abdominal:      General: Bowel sounds are normal. There is no distension. Palpations: Abdomen is soft. Tenderness: There is no abdominal tenderness. There is no right CVA tenderness or left CVA tenderness. Musculoskeletal:         General: No deformity. Cervical back: Neck supple. Skin:     General: Skin is warm and dry. Neurological:      Mental Status: She is alert and oriented to person, place, and time. Psychiatric:         Behavior: Behavior normal.        MDM  Number of Diagnoses or Management Options  Urinary tract infection without hematuria, site unspecified  Diagnosis management comments: 19-year-old female presenting to the ED with concern for possible fever in setting of current UTI as well as known kidney stone. Patient with temperature of 98.5 despite no recent antipyretics, has benign exam and reassuring vitals. Urine is somewhat equivocal for UTI but we will have her continue ciprofloxacin and follow-up closely with urology. Very mild increase in her creatinine but tolerating p.o. well and is well-hydrated on exam.  Strict return precautions given. Patient discharged. Amount and/or Complexity of Data Reviewed  Clinical lab tests: ordered and reviewed           Procedures    DISCHARGE NOTE:  The patient has been re-evaluated and feeling much better and are stable for discharge. All available radiology and laboratory results have been reviewed with patient and/or available family. Patient and/or family verbally conveyed their understanding and agreement of the patient's signs, symptoms, diagnosis, treatment and prognosis and additionally agree to follow-up as recommended in the discharge instructions or to return to the Emergency Department should their condition change or worsen prior to their follow-up appointment. All questions have been answered and patient and/or available family express understanding.       LABORATORY RESULTS:  Recent Results (from the past 24 hour(s))   URINALYSIS W/ REFLEX CULTURE    Collection Time: 12/14/22  9:18 PM    Specimen: Urine   Result Value Ref Range    Color YELLOW      Appearance CLOUDY (A) CLEAR      Specific gravity 1.010 1.003 - 1.030      pH (UA) 7.5 5.0 - 8.0      Protein TRACE (A) NEG mg/dL    Glucose Negative NEG mg/dL    Ketone Negative NEG mg/dL    Bilirubin Negative NEG      Blood Negative NEG      Urobilinogen 1.0 0.2 - 1.0 EU/dL    Nitrites Positive (A) NEG      Leukocyte Esterase MODERATE (A) NEG      WBC 5-10 0 - 4 /hpf    RBC 5-10 0 - 5 /hpf    Epithelial cells MANY (A) FEW /lpf    Bacteria 1+ (A) NEG /hpf    UA:UC IF INDICATED CULTURE NOT INDICATED BY UA RESULT CNI     CBC WITH AUTOMATED DIFF    Collection Time: 12/14/22  9:18 PM   Result Value Ref Range    WBC 4.7 3.6 - 11.0 K/uL    RBC 4.40 3.80 - 5.20 M/uL    HGB 13.1 11.5 - 16.0 g/dL    HCT 39.6 35.0 - 47.0 %    MCV 90.0 80.0 - 99.0 FL    MCH 29.8 26.0 - 34.0 PG    MCHC 33.1 30.0 - 36.5 g/dL    RDW 12.8 11.5 - 14.5 %    PLATELET 745 (L) 243 - 400 K/uL    MPV 10.9 8.9 - 12.9 FL    NRBC 0.0 0  WBC    ABSOLUTE NRBC 0.00 0.00 - 0.01 K/uL    NEUTROPHILS 71 32 - 75 %    LYMPHOCYTES 21 12 - 49 %    MONOCYTES 6 5 - 13 %    EOSINOPHILS 1 0 - 7 %    BASOPHILS 1 0 - 1 %    IMMATURE GRANULOCYTES 0 0.0 - 0.5 %    ABS. NEUTROPHILS 3.4 1.8 - 8.0 K/UL    ABS. LYMPHOCYTES 1.0 0.8 - 3.5 K/UL    ABS. MONOCYTES 0.3 0.0 - 1.0 K/UL    ABS. EOSINOPHILS 0.1 0.0 - 0.4 K/UL    ABS. BASOPHILS 0.0 0.0 - 0.1 K/UL    ABS. IMM. GRANS. 0.0 0.00 - 0.04 K/UL    DF AUTOMATED     METABOLIC PANEL, COMPREHENSIVE    Collection Time: 12/14/22  9:18 PM   Result Value Ref Range    Sodium 143 136 - 145 mmol/L    Potassium 4.0 3.5 - 5.1 mmol/L    Chloride 107 97 - 108 mmol/L    CO2 28 21 - 32 mmol/L    Anion gap 8 5 - 15 mmol/L    Glucose 115 (H) 65 - 100 mg/dL    BUN 12 6 - 20 MG/DL    Creatinine 1.12 (H) 0.55 - 1.02 MG/DL    BUN/Creatinine ratio 11 (L) 12 - 20      eGFR 50 (L) >60 ml/min/1.73m2    Calcium 9.5 8.5 - 10.1 MG/DL    Bilirubin, total 0.8 0.2 - 1.0 MG/DL    ALT (SGPT) 22 12 - 78 U/L    AST (SGOT) 12 (L) 15 - 37 U/L    Alk.  phosphatase 72 45 - 117 U/L    Protein, total 8.9 (H) 6.4 - 8.2 g/dL    Albumin 4.6 3.5 - 5.0 g/dL    Globulin 4.3 (H) 2.0 - 4.0 g/dL    A-G Ratio 1.1 1.1 - 2.2 SAMPLES BEING HELD    Collection Time: 12/14/22  9:18 PM   Result Value Ref Range    SAMPLES BEING HELD  1SST, 1RED, 1GRN, 1BLU     COMMENT        Add-on orders for these samples will be processed based on acceptable specimen integrity and analyte stability, which may vary by analyte. IMAGING RESULTS:  No results found. MEDICATIONS GIVEN:  Medications - No data to display    IMPRESSION:  1.  Urinary tract infection without hematuria, site unspecified        PLAN:  Follow-up Information       Follow up With Specialties Details Why Contact Info    Your urologist  In 1 day      [facility] EMERGENCY DEPT Emergency Medicine  As needed, If symptoms worsen [address] [place] [address]  [phone]          Discharge Medication List as of 12/14/2022 10:40 PM          Signed By: Prince Nora MD     December 15, 2022

## 2022-12-15 NOTE — ED TRIAGE NOTES
Pt presents with scratchy throat, weakness in legs, nausea and vomiting, low grade fever, pain with urination     Pt report she has a UTI, hx of UTIs     Was supposed to have surgery to removed kidney stones today but had a temp so it was postponed

## 2023-05-24 RX ORDER — SERTRALINE HYDROCHLORIDE 100 MG/1
TABLET, FILM COATED ORAL DAILY
COMMUNITY

## 2023-05-24 RX ORDER — LISINOPRIL 20 MG/1
TABLET ORAL DAILY
COMMUNITY

## 2023-05-24 RX ORDER — ALPRAZOLAM 0.5 MG/1
TABLET ORAL
COMMUNITY

## 2023-05-24 RX ORDER — ONDANSETRON 4 MG/1
TABLET, ORALLY DISINTEGRATING ORAL
COMMUNITY
Start: 2017-01-09

## 2023-05-24 RX ORDER — METOCLOPRAMIDE 5 MG/1
5 TABLET ORAL
COMMUNITY

## 2023-05-24 RX ORDER — AMLODIPINE BESYLATE 5 MG/1
5 TABLET ORAL DAILY
COMMUNITY

## 2023-05-24 RX ORDER — OMEPRAZOLE 20 MG/1
20 CAPSULE, DELAYED RELEASE ORAL DAILY
COMMUNITY

## 2023-05-24 RX ORDER — ASPIRIN 81 MG/1
81 TABLET, CHEWABLE ORAL DAILY
COMMUNITY
Start: 2022-09-20

## 2023-06-22 ENCOUNTER — HOSPITAL ENCOUNTER (EMERGENCY)
Facility: HOSPITAL | Age: 78
Discharge: HOME | End: 2023-06-22
Attending: STUDENT IN AN ORGANIZED HEALTH CARE EDUCATION/TRAINING PROGRAM
Payer: COMMERCIAL

## 2023-06-22 ENCOUNTER — APPOINTMENT (OUTPATIENT)
Facility: HOSPITAL | Age: 78
End: 2023-06-22
Payer: COMMERCIAL

## 2023-06-22 VITALS
OXYGEN SATURATION: 99 % | DIASTOLIC BLOOD PRESSURE: 70 MMHG | HEART RATE: 69 BPM | BODY MASS INDEX: 20.91 KG/M2 | HEIGHT: 63 IN | SYSTOLIC BLOOD PRESSURE: 150 MMHG | RESPIRATION RATE: 16 BRPM | WEIGHT: 118 LBS

## 2023-06-22 DIAGNOSIS — K59.01 SLOW TRANSIT CONSTIPATION: Primary | ICD-10-CM

## 2023-06-22 DIAGNOSIS — R10.84 GENERALIZED ABDOMINAL PAIN: Primary | ICD-10-CM

## 2023-06-22 DIAGNOSIS — S22.080A CLOSED WEDGE COMPRESSION FRACTURE OF T11 VERTEBRA, INITIAL ENCOUNTER (HCC): ICD-10-CM

## 2023-06-22 DIAGNOSIS — K59.01 SLOW TRANSIT CONSTIPATION: ICD-10-CM

## 2023-06-22 LAB
ALBUMIN SERPL-MCNC: 4.4 G/DL (ref 3.5–5)
ALBUMIN/GLOB SERPL: 1 (ref 1.1–2.2)
ALP SERPL-CCNC: 79 U/L (ref 45–117)
ALT SERPL-CCNC: 18 U/L (ref 12–78)
ANION GAP SERPL CALC-SCNC: 5 MMOL/L (ref 5–15)
AST SERPL-CCNC: 17 U/L (ref 15–37)
BASOPHILS # BLD: 0 K/UL (ref 0–0.1)
BASOPHILS NFR BLD: 1 % (ref 0–1)
BILIRUB SERPL-MCNC: 1.1 MG/DL (ref 0.2–1)
BUN SERPL-MCNC: 19 MG/DL (ref 6–20)
BUN/CREAT SERPL: 16 (ref 12–20)
CALCIUM SERPL-MCNC: 9.4 MG/DL (ref 8.5–10.1)
CHLORIDE SERPL-SCNC: 108 MMOL/L (ref 97–108)
CO2 SERPL-SCNC: 28 MMOL/L (ref 21–32)
COMMENT:: NORMAL
CREAT SERPL-MCNC: 1.2 MG/DL (ref 0.55–1.02)
DIFFERENTIAL METHOD BLD: ABNORMAL
EOSINOPHIL # BLD: 0.1 K/UL (ref 0–0.4)
EOSINOPHIL NFR BLD: 2 % (ref 0–7)
ERYTHROCYTE [DISTWIDTH] IN BLOOD BY AUTOMATED COUNT: 12.8 % (ref 11.5–14.5)
GLOBULIN SER CALC-MCNC: 4.2 G/DL (ref 2–4)
GLUCOSE SERPL-MCNC: 96 MG/DL (ref 65–100)
HCT VFR BLD AUTO: 36.2 % (ref 35–47)
HGB BLD-MCNC: 12.4 G/DL (ref 11.5–16)
IMM GRANULOCYTES # BLD AUTO: 0 K/UL (ref 0–0.04)
IMM GRANULOCYTES NFR BLD AUTO: 0 % (ref 0–0.5)
LYMPHOCYTES # BLD: 2 K/UL (ref 0.8–3.5)
LYMPHOCYTES NFR BLD: 37 % (ref 12–49)
MCH RBC QN AUTO: 29.6 PG (ref 26–34)
MCHC RBC AUTO-ENTMCNC: 34.3 G/DL (ref 30–36.5)
MCV RBC AUTO: 86.4 FL (ref 80–99)
MONOCYTES # BLD: 0.4 K/UL (ref 0–1)
MONOCYTES NFR BLD: 8 % (ref 5–13)
NEUTS SEG # BLD: 2.8 K/UL (ref 1.8–8)
NEUTS SEG NFR BLD: 52 % (ref 32–75)
NRBC # BLD: 0 K/UL (ref 0–0.01)
NRBC BLD-RTO: 0 PER 100 WBC
PLATELET # BLD AUTO: 123 K/UL (ref 150–400)
PMV BLD AUTO: 11 FL (ref 8.9–12.9)
POTASSIUM SERPL-SCNC: 3.5 MMOL/L (ref 3.5–5.1)
PROT SERPL-MCNC: 8.6 G/DL (ref 6.4–8.2)
RBC # BLD AUTO: 4.19 M/UL (ref 3.8–5.2)
SODIUM SERPL-SCNC: 141 MMOL/L (ref 136–145)
SPECIMEN HOLD: NORMAL
WBC # BLD AUTO: 5.5 K/UL (ref 3.6–11)

## 2023-06-22 PROCEDURE — 74177 CT ABD & PELVIS W/CONTRAST: CPT

## 2023-06-22 PROCEDURE — 2580000003 HC RX 258: Performed by: STUDENT IN AN ORGANIZED HEALTH CARE EDUCATION/TRAINING PROGRAM

## 2023-06-22 PROCEDURE — 99285 EMERGENCY DEPT VISIT HI MDM: CPT

## 2023-06-22 PROCEDURE — 36415 COLL VENOUS BLD VENIPUNCTURE: CPT

## 2023-06-22 PROCEDURE — 80053 COMPREHEN METABOLIC PANEL: CPT

## 2023-06-22 PROCEDURE — 85025 COMPLETE CBC W/AUTO DIFF WBC: CPT

## 2023-06-22 PROCEDURE — 6360000004 HC RX CONTRAST MEDICATION: Performed by: STUDENT IN AN ORGANIZED HEALTH CARE EDUCATION/TRAINING PROGRAM

## 2023-06-22 RX ORDER — POLYETHYLENE GLYCOL 3350 17 G/17G
17 POWDER, FOR SOLUTION ORAL DAILY
Qty: 510 G | Refills: 0 | Status: SHIPPED | OUTPATIENT
Start: 2023-06-22 | End: 2023-07-22

## 2023-06-22 RX ORDER — 0.9 % SODIUM CHLORIDE 0.9 %
1000 INTRAVENOUS SOLUTION INTRAVENOUS ONCE
Status: COMPLETED | OUTPATIENT
Start: 2023-06-22 | End: 2023-06-22

## 2023-06-22 RX ADMIN — IOPAMIDOL 100 ML: 755 INJECTION, SOLUTION INTRAVENOUS at 11:55

## 2023-06-22 RX ADMIN — SODIUM CHLORIDE 1000 ML: 9 INJECTION, SOLUTION INTRAVENOUS at 11:03

## 2023-06-22 ASSESSMENT — LIFESTYLE VARIABLES
HOW OFTEN DO YOU HAVE A DRINK CONTAINING ALCOHOL: NEVER
HOW MANY STANDARD DRINKS CONTAINING ALCOHOL DO YOU HAVE ON A TYPICAL DAY: PATIENT DOES NOT DRINK

## 2023-06-22 ASSESSMENT — ENCOUNTER SYMPTOMS
NAUSEA: 0
VOMITING: 0
DIARRHEA: 1
CONSTIPATION: 1
ABDOMINAL PAIN: 1
SHORTNESS OF BREATH: 0
RHINORRHEA: 0
SORE THROAT: 0
BLOOD IN STOOL: 0
COUGH: 0

## 2023-06-22 ASSESSMENT — PAIN DESCRIPTION - ORIENTATION: ORIENTATION: ANTERIOR

## 2023-06-22 ASSESSMENT — PAIN SCALES - GENERAL: PAINLEVEL_OUTOF10: 5

## 2023-06-22 ASSESSMENT — PAIN DESCRIPTION - PAIN TYPE: TYPE: ACUTE PAIN

## 2023-06-22 ASSESSMENT — PAIN DESCRIPTION - DESCRIPTORS: DESCRIPTORS: ACHING

## 2023-06-22 ASSESSMENT — PAIN - FUNCTIONAL ASSESSMENT: PAIN_FUNCTIONAL_ASSESSMENT: 0-10

## 2023-06-22 ASSESSMENT — PAIN DESCRIPTION - LOCATION: LOCATION: ABDOMEN

## 2023-06-22 ASSESSMENT — PAIN DESCRIPTION - FREQUENCY: FREQUENCY: INTERMITTENT

## 2023-06-22 NOTE — DISCHARGE INSTRUCTIONS
Start taking Miralax 17 g once daily. Continue daily fiber supplement and good hydration. Talk to your PCP about seeing orthopedics regarding the new compression fracture at you 11th thoracic vertebrae. You may need a procedure called a kyphoplasty to help this. Call your urologist and see if they are able to review the CT scan we did in the ED today.

## 2023-06-22 NOTE — ED TRIAGE NOTES
Pt arrives ambulatory w/ walker via POV w/ c/c of \"trouble with my stomach that I have had for years. \" Pt reports hx of colectomy in 2005.

## 2023-06-22 NOTE — ED NOTES
recognition program.  Efforts were made to edit the dictations but occasionally words are mis-transcribed.)    Quinn Ferro MD (electronically signed)  Emergency Attending Physician / Physician Assistant / Nurse Practitioner            Quinn Ferro MD  Resident  06/22/23 [id number]

## 2024-01-30 ENCOUNTER — OFFICE VISIT (OUTPATIENT)
Facility: CLINIC | Age: 79
End: 2024-01-30
Payer: COMMERCIAL

## 2024-01-30 VITALS
HEIGHT: 64 IN | HEART RATE: 80 BPM | RESPIRATION RATE: 16 BRPM | TEMPERATURE: 97.2 F | OXYGEN SATURATION: 98 % | DIASTOLIC BLOOD PRESSURE: 77 MMHG | BODY MASS INDEX: 22.2 KG/M2 | SYSTOLIC BLOOD PRESSURE: 130 MMHG | WEIGHT: 130 LBS

## 2024-01-30 DIAGNOSIS — N39.0 FREQUENT UTI: Primary | ICD-10-CM

## 2024-01-30 DIAGNOSIS — F33.9 MDD (MAJOR DEPRESSIVE DISORDER), RECURRENT EPISODE, WITH ATYPICAL FEATURES (HCC): ICD-10-CM

## 2024-01-30 DIAGNOSIS — E78.2 MIXED HYPERLIPIDEMIA: ICD-10-CM

## 2024-01-30 DIAGNOSIS — Z87.442 HISTORY OF RENAL STONE: ICD-10-CM

## 2024-01-30 DIAGNOSIS — K76.0 NONALCOHOLIC FATTY LIVER DISEASE WITHOUT NONALCOHOLIC STEATOHEPATITIS (NASH): ICD-10-CM

## 2024-01-30 DIAGNOSIS — K64.4 EXTERNAL HEMORRHOID: ICD-10-CM

## 2024-01-30 DIAGNOSIS — Z90.49 S/P PARTIAL COLECTOMY: ICD-10-CM

## 2024-01-30 DIAGNOSIS — I10 PRIMARY HYPERTENSION: ICD-10-CM

## 2024-01-30 PROCEDURE — 3075F SYST BP GE 130 - 139MM HG: CPT | Performed by: FAMILY MEDICINE

## 2024-01-30 PROCEDURE — 3078F DIAST BP <80 MM HG: CPT | Performed by: FAMILY MEDICINE

## 2024-01-30 PROCEDURE — 99204 OFFICE O/P NEW MOD 45 MIN: CPT | Performed by: FAMILY MEDICINE

## 2024-01-30 PROCEDURE — 1123F ACP DISCUSS/DSCN MKR DOCD: CPT | Performed by: FAMILY MEDICINE

## 2024-01-30 RX ORDER — BENZOCAINE/MENTHOL 6 MG-10 MG
LOZENGE MUCOUS MEMBRANE
Qty: 30 G | Refills: 1 | Status: SHIPPED | OUTPATIENT
Start: 2024-01-30 | End: 2024-02-06

## 2024-01-30 RX ORDER — BENZOCAINE/MENTHOL 6 MG-10 MG
LOZENGE MUCOUS MEMBRANE
Qty: 30 G | Refills: 1 | Status: SHIPPED | OUTPATIENT
Start: 2024-01-30 | End: 2024-01-30

## 2024-01-30 RX ORDER — ESTRADIOL 0.1 MG/D
1 FILM, EXTENDED RELEASE TRANSDERMAL
COMMUNITY

## 2024-01-30 ASSESSMENT — PATIENT HEALTH QUESTIONNAIRE - PHQ9
SUM OF ALL RESPONSES TO PHQ QUESTIONS 1-9: 0
1. LITTLE INTEREST OR PLEASURE IN DOING THINGS: 0
SUM OF ALL RESPONSES TO PHQ9 QUESTIONS 1 & 2: 0
SUM OF ALL RESPONSES TO PHQ QUESTIONS 1-9: 0
2. FEELING DOWN, DEPRESSED OR HOPELESS: 0
SUM OF ALL RESPONSES TO PHQ QUESTIONS 1-9: 0
SUM OF ALL RESPONSES TO PHQ QUESTIONS 1-9: 0

## 2024-01-30 NOTE — PROGRESS NOTES
Chief Complaint   Patient presents with    New Patient     New pt to practice pt in good spirits pt deals with bladder infection chronically is being followed by uro.  2005 colon resection.  [facility] is pretty good for 79 and \"I have made it this far\".

## 2024-01-30 NOTE — ASSESSMENT & PLAN NOTE
Continue monitoring liver enzymes, if elevates repeat liver imaging ultrasound. Consider elastography/fibroscan.    Resistant to statins though has never tried them. Also 79 years old never had issues. Will discuss more with cardiologist.

## 2024-01-30 NOTE — ASSESSMENT & PLAN NOTE
Well-controlled, continue current medications and continue current treatment plan. Recommend therapist.

## 2024-01-30 NOTE — ASSESSMENT & PLAN NOTE
Monitored by specialist- no acute findings meriting change in the plan talk with cards. Don't think indicated given age and lack of secondary events, though does report possible plaque in aorta?

## 2024-01-30 NOTE — PROGRESS NOTES
Family Medicine Initial Office Visit  Patient: Patricia Huston  1945, 79 y.o., female  Encounter Date: 1/30/2024    ASSESSMENT & PLAN  Patricia Huston is a 79 y.o. female   who presented for established care with below concerns:    1. Frequent UTI  Overview:  Background: for many years, an episode of severe SEPSIS from urologic source bacteremia Sept 5-19, 2022.  Specialist: Dr. Guanakito Rees at [facility]  2. S/P partial colectomy  Overview:  Severe diverticulosis.    3. Primary hypertension  Overview:  BP Readings from Last 3 Encounters:   01/30/24 130/77   06/22/23 (!) 150/70   09/19/22 99/61     Medication: amlodipine 5mg daily, lisinopril 20mg daily  Assessment & Plan:   Well-controlled, continue current medications  4. MDD (major depressive disorder), recurrent episode, with atypical features (HCC)  Overview:  Background: is told she has atypical features where she has behavioral particularities with hyperfixation and some anxiety.     PHQ-9 Total Score: 0 (1/30/2024  1:48 PM)    Medication: Zoloft 100mg daily, Xanax 0.5mg nightly  Specialist: none currently    Interval Hx:   - doing well, controlled on above medication for many years.  - no thoughts of SI/HI.  Assessment & Plan:   Well-controlled, continue current medications and continue current treatment plan. Recommend therapist.  5. History of renal stone  Overview:  X2 , found while in urosepsis at hospital, though Urology believed to be unrelated to recurrent UTIs per patient.  Assessment & Plan:  Aware, recommend stone diet.  6. Nonalcoholic fatty liver disease without nonalcoholic steatohepatitis (CONTEH)  Overview:  Lab Results   Component Value Date     06/22/2023    K 3.5 06/22/2023     06/22/2023    CO2 28 06/22/2023    BUN 19 06/22/2023    CREATININE 1.20 (H) 06/22/2023    GLUCOSE 96 06/22/2023    CALCIUM 9.4 06/22/2023    PROT 8.6 (H) 06/22/2023    LABALBU 4.4 06/22/2023    BILITOT 1.1 (H) 06/22/2023    ALKPHOS 79 06/22/2023    AST

## 2024-03-20 ENCOUNTER — OFFICE VISIT (OUTPATIENT)
Age: 79
End: 2024-03-20
Payer: MEDICARE

## 2024-03-20 VITALS
DIASTOLIC BLOOD PRESSURE: 80 MMHG | RESPIRATION RATE: 16 BRPM | HEART RATE: 83 BPM | WEIGHT: 131.8 LBS | OXYGEN SATURATION: 99 % | BODY MASS INDEX: 22.5 KG/M2 | HEIGHT: 64 IN | SYSTOLIC BLOOD PRESSURE: 130 MMHG

## 2024-03-20 DIAGNOSIS — N18.31 STAGE 3A CHRONIC KIDNEY DISEASE (HCC): ICD-10-CM

## 2024-03-20 DIAGNOSIS — E78.5 HYPERLIPIDEMIA, UNSPECIFIED HYPERLIPIDEMIA TYPE: ICD-10-CM

## 2024-03-20 DIAGNOSIS — I35.0 NONRHEUMATIC AORTIC VALVE STENOSIS: ICD-10-CM

## 2024-03-20 DIAGNOSIS — I10 PRIMARY HYPERTENSION: Primary | ICD-10-CM

## 2024-03-20 PROCEDURE — 3075F SYST BP GE 130 - 139MM HG: CPT | Performed by: STUDENT IN AN ORGANIZED HEALTH CARE EDUCATION/TRAINING PROGRAM

## 2024-03-20 PROCEDURE — 93010 ELECTROCARDIOGRAM REPORT: CPT | Performed by: STUDENT IN AN ORGANIZED HEALTH CARE EDUCATION/TRAINING PROGRAM

## 2024-03-20 PROCEDURE — 99204 OFFICE O/P NEW MOD 45 MIN: CPT | Performed by: STUDENT IN AN ORGANIZED HEALTH CARE EDUCATION/TRAINING PROGRAM

## 2024-03-20 PROCEDURE — 1123F ACP DISCUSS/DSCN MKR DOCD: CPT | Performed by: STUDENT IN AN ORGANIZED HEALTH CARE EDUCATION/TRAINING PROGRAM

## 2024-03-20 PROCEDURE — 3079F DIAST BP 80-89 MM HG: CPT | Performed by: STUDENT IN AN ORGANIZED HEALTH CARE EDUCATION/TRAINING PROGRAM

## 2024-03-20 PROCEDURE — 93005 ELECTROCARDIOGRAM TRACING: CPT | Performed by: STUDENT IN AN ORGANIZED HEALTH CARE EDUCATION/TRAINING PROGRAM

## 2024-03-20 NOTE — PROGRESS NOTES
had concerns including Hypertension and Cholesterol Problem.    Vitals:    03/20/24 0851   BP: 130/80   Site: Left Upper Arm   Position: Sitting   Pulse: 83   Resp: 16   SpO2: 99%   Weight: 59.8 kg (131 lb 12.8 oz)   Height: 1.613 m (5' 3.5\")        Chest pain No    Refills No        1. Have you been to the ER, urgent care clinic since your last visit? No       Hospitalized since your last visit? No       Where?        Date?  
MG delayed release capsule, Take 1 capsule by mouth daily, Disp: , Rfl:     ondansetron (ZOFRAN-ODT) 4 MG disintegrating tablet, ceived the following from [facility]: Outside name: ondansetron (ZOFRAN ODT) 4 mg disintegrating tablet, Disp: , Rfl:     sertraline (ZOLOFT) 100 MG tablet, TAKE ONE TABLET BY MOUTH ONE TIME DAILY, Disp: 90 tablet, Rfl: 1    aspirin 81 MG chewable tablet, Take 1 tablet by mouth daily (Patient not taking: Reported on 3/20/2024), Disp: , Rfl:     Allergies   Allergen Reactions    Cephalexin     Metaxalone     Metronidazole Nausea Only    Nitrofurantoin Nausea Only    Sulfa Antibiotics Rash        Family History   Problem Relation Age of Onset    Hypertension Mother        Social History     Tobacco Use    Smoking status: Never    Smokeless tobacco: Never   Substance Use Topics    Alcohol use: No       Review of Symptoms:  Pertinent Positive:neg  Pertinent Negative:No chest pain, dyspnea on exertion, shortness of breath, orthopnea, PND  All Other systems reviewed and are negative for a Comprehensive ROS (10+)    Physical Exam    Vitals:    03/20/24 0851   BP: 130/80   Site: Left Upper Arm   Position: Sitting   Pulse: 83   Resp: 16   SpO2: 99%   Weight: 59.8 kg (131 lb 12.8 oz)   Height: 1.613 m (5' 3.5\")       Constitutional:  well-developed and well-nourished. No distress.  HENT: Normocephalic.   Eyes: No scleral icterus.   Neck:  Neck supple. No JVD present.   Pulmonary/Chest: Effort normal and breath sounds normal. No respiratory distress, wheezes or rales.  Cardiovascular: Normal rate, regular rhythm, S1 S2 . Exam reveals 3/6 MICHELLE  No edema.  Extremities:  Normal muscle tone  Abdominal:   No abnormal distension.   Neurological:  Moving all extremities, cranial nerves appear grossly intact.  Skin: Skin is not cold.  Not diaphoretic. No erythema.   Psychiatric:  Grossly normal mood and affect.  Intact insight.    Objective Data:    Investigations personally reviewed and

## 2024-03-21 ENCOUNTER — TELEPHONE (OUTPATIENT)
Facility: CLINIC | Age: 79
End: 2024-03-21

## 2024-03-21 RX ORDER — SERTRALINE HYDROCHLORIDE 100 MG/1
100 TABLET, FILM COATED ORAL DAILY
Qty: 90 TABLET | Refills: 1 | Status: SHIPPED | OUTPATIENT
Start: 2024-03-21

## 2024-03-22 PROBLEM — N18.30 CHRONIC KIDNEY DISEASE, STAGE 3 UNSPECIFIED (HCC): Status: ACTIVE | Noted: 2024-03-22

## 2024-03-28 ENCOUNTER — HOSPITAL ENCOUNTER (EMERGENCY)
Facility: HOSPITAL | Age: 79
Discharge: HOME | End: 2024-03-28
Attending: EMERGENCY MEDICINE
Payer: MEDICARE

## 2024-03-28 VITALS
OXYGEN SATURATION: 100 % | BODY MASS INDEX: 23.35 KG/M2 | HEART RATE: 79 BPM | WEIGHT: 131.8 LBS | SYSTOLIC BLOOD PRESSURE: 133 MMHG | RESPIRATION RATE: 16 BRPM | TEMPERATURE: 97.9 F | HEIGHT: 63 IN | DIASTOLIC BLOOD PRESSURE: 71 MMHG

## 2024-03-28 DIAGNOSIS — R30.0 DYSURIA: ICD-10-CM

## 2024-03-28 DIAGNOSIS — R33.9 URINARY RETENTION: Primary | ICD-10-CM

## 2024-03-28 LAB
ALBUMIN SERPL-MCNC: 4.4 G/DL (ref 3.5–5)
ALBUMIN/GLOB SERPL: 1 (ref 1.1–2.2)
ALP SERPL-CCNC: 74 U/L (ref 45–117)
ALT SERPL-CCNC: 20 U/L (ref 12–78)
ANION GAP SERPL CALC-SCNC: 6 MMOL/L (ref 5–15)
APPEARANCE UR: CLEAR
AST SERPL-CCNC: 16 U/L (ref 15–37)
BACTERIA URNS QL MICRO: NEGATIVE /HPF
BASOPHILS # BLD: 0 K/UL (ref 0–0.1)
BASOPHILS NFR BLD: 1 % (ref 0–1)
BILIRUB SERPL-MCNC: 1.1 MG/DL (ref 0.2–1)
BILIRUB UR QL: NEGATIVE
BUN SERPL-MCNC: 21 MG/DL (ref 6–20)
BUN/CREAT SERPL: 14 (ref 12–20)
CALCIUM SERPL-MCNC: 9.8 MG/DL (ref 8.5–10.1)
CHLORIDE SERPL-SCNC: 106 MMOL/L (ref 97–108)
CO2 SERPL-SCNC: 25 MMOL/L (ref 21–32)
COLOR UR: ABNORMAL
COMMENT:: NORMAL
CREAT SERPL-MCNC: 1.55 MG/DL (ref 0.55–1.02)
DIFFERENTIAL METHOD BLD: ABNORMAL
EOSINOPHIL # BLD: 0.1 K/UL (ref 0–0.4)
EOSINOPHIL NFR BLD: 2 % (ref 0–7)
EPITH CASTS URNS QL MICRO: ABNORMAL /LPF
ERYTHROCYTE [DISTWIDTH] IN BLOOD BY AUTOMATED COUNT: 12.4 % (ref 11.5–14.5)
GLOBULIN SER CALC-MCNC: 4.5 G/DL (ref 2–4)
GLUCOSE SERPL-MCNC: 105 MG/DL (ref 65–100)
GLUCOSE UR STRIP.AUTO-MCNC: NEGATIVE MG/DL
HCT VFR BLD AUTO: 39.1 % (ref 35–47)
HGB BLD-MCNC: 13.7 G/DL (ref 11.5–16)
HGB UR QL STRIP: NEGATIVE
IMM GRANULOCYTES # BLD AUTO: 0 K/UL (ref 0–0.04)
IMM GRANULOCYTES NFR BLD AUTO: 0 % (ref 0–0.5)
KETONES UR QL STRIP.AUTO: NEGATIVE MG/DL
LEUKOCYTE ESTERASE UR QL STRIP.AUTO: ABNORMAL
LIPASE SERPL-CCNC: 42 U/L (ref 13–75)
LYMPHOCYTES # BLD: 1.1 K/UL (ref 0.8–3.5)
LYMPHOCYTES NFR BLD: 17 % (ref 12–49)
MCH RBC QN AUTO: 30.7 PG (ref 26–34)
MCHC RBC AUTO-ENTMCNC: 35 G/DL (ref 30–36.5)
MCV RBC AUTO: 87.7 FL (ref 80–99)
MONOCYTES # BLD: 0.4 K/UL (ref 0–1)
MONOCYTES NFR BLD: 6 % (ref 5–13)
NEUTS SEG # BLD: 5 K/UL (ref 1.8–8)
NEUTS SEG NFR BLD: 75 % (ref 32–75)
NITRITE UR QL STRIP.AUTO: NEGATIVE
NRBC # BLD: 0 K/UL (ref 0–0.01)
NRBC BLD-RTO: 0 PER 100 WBC
PH UR STRIP: 6 (ref 5–8)
PLATELET # BLD AUTO: 126 K/UL (ref 150–400)
PMV BLD AUTO: 10.9 FL (ref 8.9–12.9)
POTASSIUM SERPL-SCNC: 4.5 MMOL/L (ref 3.5–5.1)
PROT SERPL-MCNC: 8.9 G/DL (ref 6.4–8.2)
PROT UR STRIP-MCNC: NEGATIVE MG/DL
RBC # BLD AUTO: 4.46 M/UL (ref 3.8–5.2)
RBC #/AREA URNS HPF: ABNORMAL /HPF (ref 0–5)
SODIUM SERPL-SCNC: 137 MMOL/L (ref 136–145)
SP GR UR REFRACTOMETRY: 1.01 (ref 1–1.03)
SPECIMEN HOLD: NORMAL
URINE CULTURE IF INDICATED: ABNORMAL
UROBILINOGEN UR QL STRIP.AUTO: 1 EU/DL (ref 0.2–1)
WBC # BLD AUTO: 6.6 K/UL (ref 3.6–11)
WBC URNS QL MICRO: ABNORMAL /HPF (ref 0–4)

## 2024-03-28 PROCEDURE — 99283 EMERGENCY DEPT VISIT LOW MDM: CPT

## 2024-03-28 PROCEDURE — 80053 COMPREHEN METABOLIC PANEL: CPT

## 2024-03-28 PROCEDURE — 85025 COMPLETE CBC W/AUTO DIFF WBC: CPT

## 2024-03-28 PROCEDURE — 87186 SC STD MICRODIL/AGAR DIL: CPT

## 2024-03-28 PROCEDURE — 51798 US URINE CAPACITY MEASURE: CPT

## 2024-03-28 PROCEDURE — 51702 INSERT TEMP BLADDER CATH: CPT

## 2024-03-28 PROCEDURE — 87086 URINE CULTURE/COLONY COUNT: CPT

## 2024-03-28 PROCEDURE — 83690 ASSAY OF LIPASE: CPT

## 2024-03-28 PROCEDURE — 87088 URINE BACTERIA CULTURE: CPT

## 2024-03-28 PROCEDURE — 81001 URINALYSIS AUTO W/SCOPE: CPT

## 2024-03-28 ASSESSMENT — PAIN SCALES - GENERAL: PAINLEVEL_OUTOF10: 0

## 2024-03-28 ASSESSMENT — PAIN - FUNCTIONAL ASSESSMENT: PAIN_FUNCTIONAL_ASSESSMENT: 0-10

## 2024-03-28 NOTE — ED NOTES
This nurse performed a bladder scan.     Obtained 3 separate scans ranging from 244 mL to 368 mL.    Provider notified

## 2024-03-28 NOTE — CONSULTS
New Urology Consult Note    Patient: Patricia Huston MRN: [id number]  SSN: [id number]    YOB: 1945  Age: 79 y.o.  Sex: female            Plan:     Hx Manav  -s/p gentamicin x3 injections, last received 3/23 at . She reports improvement of symptoms since completion of injections. Primary concern today is malaise.   -Obtain PVR. Place coughlin if PVR >400ml   -Afebrile, no leukocytosis, resolution of the urinary complaints she had with active UTI prior to gentamicin injections. Concern for contaminated UA here. Monitor UCX prior to initiation of abx.      She has an appt with  tomorrow AM she tells me. We will re-evaluate her tomorrow.     Thank you for this consult. Please contact [facility] with any further questions/concerns.    History of Present Illness:     Chief Complaint:  malaise    Patricia Huston is seen in consultation for reasons noted above at the request of Marcos Oliver MD. Admitted to hospital for <principal problem not specified>    This is a 79 y.o. female with a history of rUTIs, hx embolization with coils in R kidney, nephrolithiasis s/p PCNL presents to ER today with complaints of general malaise. Known to . She saw Dr. Rees 3/21. Hx recurrent UTI. + UCX 2/9 + klebsiella- she was given gentamicin and doxy. She did not complete doxy due to GI upset. She returned to office 3/16 with UTI complaints, empirically started on Cipro. UCX resent, resulting in E.Coli with multiple resistances. Limited PO options given allergies. She was seen to discuss on 3/21 with Dr. Rees. She was given IM gentamicin in office 3/21, 3/22 and 3/23. She was advised to present to ER for IV abx should her symptoms worsen.     She tells me after completing her gent injections, she felt better. She reports improvement in dysuria, bladder pressure. She states these symptoms are present at her baseline, heightened during infections. Malaise started this morning, prompting her to

## 2024-03-28 NOTE — DISCHARGE INSTRUCTIONS
There was no obvious signs of a urinary infection on your testing today.  Your bladder was not fully emptying and so a Escobar catheter was placed.  Keep this Escobar catheter in place until you follow-up with urology specialist tomorrow.  Return to the emergency department if you have any changing or worsening symptoms, fevers or any other concerns.

## 2024-03-28 NOTE — ED NOTES
Before patient left, this Nurse rechecked the leg bag for urine output and noted 600 mL.    Educated patient on how to drain bag. Patient did a teach lisa to this Nurse to ensure understanding was there.    Patient was stable and ambulatory at time of discharge

## 2024-03-28 NOTE — ED TRIAGE NOTES
Patient arrives to the ED via POV with complaints of dysuria and urinary retention.    Patient was diagnosed with UTI two weeks ago.    Patient also reports lower abdominal pain.    Patient taking gentamicin and doxycycline with no relief.

## 2024-03-28 NOTE — ED NOTES
This nurse was present for a urinary catheter insertion.    Patient tolerated well. Patient was talked through entire procedure.    16 FR placed at 1600 then switched to leg bag

## 2024-03-29 ENCOUNTER — HOSPITAL ENCOUNTER (EMERGENCY)
Facility: HOSPITAL | Age: 79
Discharge: HOME | End: 2024-03-29
Attending: EMERGENCY MEDICINE
Payer: MEDICARE

## 2024-03-29 VITALS
DIASTOLIC BLOOD PRESSURE: 74 MMHG | HEART RATE: 103 BPM | RESPIRATION RATE: 16 BRPM | BODY MASS INDEX: 23.36 KG/M2 | WEIGHT: 131.84 LBS | OXYGEN SATURATION: 100 % | TEMPERATURE: 98.2 F | SYSTOLIC BLOOD PRESSURE: 156 MMHG | HEIGHT: 63 IN

## 2024-03-29 DIAGNOSIS — T83.9XXA FOLEY CATHETER PROBLEM, INITIAL ENCOUNTER (HCC): Primary | ICD-10-CM

## 2024-03-29 PROCEDURE — 99283 EMERGENCY DEPT VISIT LOW MDM: CPT

## 2024-03-29 ASSESSMENT — PAIN SCALES - GENERAL: PAINLEVEL_OUTOF10: 5

## 2024-03-29 ASSESSMENT — PAIN - FUNCTIONAL ASSESSMENT: PAIN_FUNCTIONAL_ASSESSMENT: 0-10

## 2024-03-29 ASSESSMENT — PAIN DESCRIPTION - ORIENTATION: ORIENTATION: LOWER

## 2024-03-29 ASSESSMENT — PAIN DESCRIPTION - LOCATION: LOCATION: VAGINA

## 2024-03-29 ASSESSMENT — PAIN DESCRIPTION - DESCRIPTORS: DESCRIPTORS: BURNING

## 2024-03-29 NOTE — ED PROVIDER NOTES
with over 600 cc urine output.  Stable for discharge with urology follow-up tomorrow and strict return precautions.    Amount and/or Complexity of Data Reviewed  Labs: ordered.        REASSESSMENT     ED Course as of 03/29/24 0707   Thu Mar 28, 2024   1446 Seen by [facility]. They are recommending dc with no antibiotics and will follow up tomorrow. [AS]      ED Course User Index  [AS] Marcos Oliver MD       CONSULTS:  IP CONSULT TO UROLOGY    PROCEDURES:  Procedures    FINAL IMPRESSION      1. Urinary retention    2. Dysuria          DISPOSITION/PLAN   DISPOSITION Decision To Discharge 03/28/2024 03:28:38 PM    (Please note that portions of this note were completed with a voice recognition program.  Efforts were made to edit the dictations but occasionally words are mis-transcribed.)    Marcos Oliver MD (electronically signed)  Emergency Attending Physician        Marcos Oliver MD  03/29/24 0711

## 2024-03-29 NOTE — ED TRIAGE NOTES
Pt arrives co coughlin catheter not draining. States she had it placed her yesterday and it was working fine up until 2 hours ago

## 2024-03-29 NOTE — ED PROVIDER NOTES
[facility] EMERGENCY DEPT  EMERGENCY DEPARTMENT ENCOUNTER      Pt Name: Patricia Huston  MRN: [id number]  Birthdate 1945  Date of evaluation: 3/29/2024  Provider: Monique Alamo PA-C    CHIEF COMPLAINT     No chief complaint on file.        HISTORY OF PRESENT ILLNESS   (Location/Symptom, Timing/Onset, Context/Setting, Quality, Duration, Modifying Factors, Severity)  Note limiting factors.   79 year old female with catheter placed yesterday in ED and returned home, but stopped draining as of 2 hours prior to arrival. Catheter was placed for retention yesterday in ED and she was seen in the [facility] office today prior to this occurring. She has discomfort with the catheter, but states this is commonly present. Yesterday in the ED her PVR was over 350 ml.      The history is provided by the patient.         Review of External Medical Records:     Nursing Notes were reviewed.    REVIEW OF SYSTEMS    (2-9 systems for level 4, 10 or more for level 5)     Review of Systems    Except as noted above the remainder of the review of systems was reviewed and negative.       PAST MEDICAL HISTORY     Past Medical History:   Diagnosis Date    Depression     Hypertension     Kidney stone     Sepsis (HCC)          SURGICAL HISTORY       Past Surgical History:   Procedure Laterality Date    COLON SURGERY           CURRENT MEDICATIONS       Previous Medications    ALPRAZOLAM (XANAX) 0.5 MG TABLET    Take by mouth.    AMLODIPINE (NORVASC) 5 MG TABLET    Take 1 tablet by mouth daily    ASPIRIN 81 MG CHEWABLE TABLET    Take 1 tablet by mouth daily    ESTRADIOL (VIVELLE) 0.1 MG/24HR    Place 1 patch onto the skin Twice a Week    LISINOPRIL (PRINIVIL;ZESTRIL) 20 MG TABLET    Take by mouth daily    METOCLOPRAMIDE (REGLAN) 5 MG TABLET    Take 1 tablet by mouth 3 times daily (before meals)    OMEPRAZOLE (PRILOSEC) 20 MG DELAYED RELEASE CAPSULE    Take 1 capsule by mouth daily    ONDANSETRON (ZOFRAN-ODT) 4 MG DISINTEGRATING TABLET     ceived the following from [facility]: Outside name: ondansetron (ZOFRAN ODT) 4 mg disintegrating tablet    SERTRALINE (ZOLOFT) 100 MG TABLET    TAKE ONE TABLET BY MOUTH ONE TIME DAILY       ALLERGIES     Cephalexin, Metaxalone, Metronidazole, Nitrofurantoin, and Sulfa antibiotics    FAMILY HISTORY       Family History   Problem Relation Age of Onset    Hypertension Mother           SOCIAL HISTORY       Social History     Socioeconomic History    Marital status:    Tobacco Use    Smoking status: Never    Smokeless tobacco: Never   Substance and Sexual Activity    Alcohol use: No           PHYSICAL EXAM    (up to 7 for level 4, 8 or more for level 5)     ED Triage Vitals   BP Temp Temp src Pulse Resp SpO2 Height Weight   -- -- -- -- -- -- -- --       There is no height or weight on file to calculate BMI.    Physical Exam  Abdominal:      General: Bowel sounds are normal. There is no distension.      Palpations: Abdomen is soft.      Tenderness: There is no abdominal tenderness. There is no guarding.   Genitourinary:     Comments: The patient is quite anxious, but upon evaluation, the catheter appears to be in good placement, it is draining urine, there is urine in the bag as well, and no leaking around the meatus.        DIAGNOSTIC RESULTS     EKG: All EKG's are interpreted by the Emergency Department Physician who either signs or Co-signs this chart in the absence of a cardiologist.        RADIOLOGY:   Non-plain film images such as CT, Ultrasound and MRI are read by the radiologist. Plain radiographic images are visualized and preliminarily interpreted by the emergency physician with the below findings:        Interpretation per the Radiologist below, if available at the time of this note:    No orders to display        LABS:  Labs Reviewed - No data to display    All other labs were within normal range or not returned as of this dictation.    EMERGENCY DEPARTMENT COURSE and DIFFERENTIAL

## 2024-03-30 LAB
BACTERIA SPEC CULT: ABNORMAL
BACTERIA SPEC CULT: ABNORMAL
CC UR VC: ABNORMAL
SERVICE CMNT-IMP: ABNORMAL

## 2024-03-30 RX ORDER — NITROFURANTOIN 25; 75 MG/1; MG/1
100 CAPSULE ORAL 2 TIMES DAILY
Qty: 14 CAPSULE | Refills: 0 | Status: SHIPPED | OUTPATIENT
Start: 2024-03-30 | End: 2024-04-06

## 2024-03-30 NOTE — ED NOTES
has been positive urine cultures.  Patient's labs and notes, patient was seen yesterday and was advised the patient antibiotics.  The patient had a positive urine culture since the patient multiple times.  This time, patient will be placed on Macrobid and she has nausea medicine at home.  She does have a listed allergy to Macrobid, she has nausea only.  Will place her on Macrobid and she is to follow-up with urology as an outpatient     Fidel Marie, DO  03/30/24 3849

## 2024-05-14 ENCOUNTER — TELEPHONE (OUTPATIENT)
Facility: CLINIC | Age: 79
End: 2024-05-14

## 2024-05-14 NOTE — TELEPHONE ENCOUNTER
----- Message from [employer] sent at 5/13/2024  2:43 PM EDT -----  Subject: Message to Provider    QUESTIONS  Information for Provider? urologist is prescribing trinetahopherin for   bladder infection. this medication requires that she not take lisinopril   for five days and will take the antibiotic for 7 days. patient wants   office to call and provide guidance on how to proceed. if she doesn't opt   for this she will be required to do the antibiotic intravenously at the   hospital   ---------------------------------------------------------------------------  --------------  CALL BACK INFO  [phone]; OK to leave message on voicemail  ---------------------------------------------------------------------------  --------------  SCRIPT ANSWERS  Relationship to Patient? Self

## 2024-05-14 NOTE — TELEPHONE ENCOUNTER
Melanie Ornelas, APRN - CNP  You5 minutes ago (8:34 AM)       Why doesn't she stop the lisinopril and monitor her BP at home. I see she is also on amlodipine 5 mg. She could increase this to 10 mg while she is without her lisinopril, that would be fine.    Thanks

## 2024-05-14 NOTE — TELEPHONE ENCOUNTER
Called and spoke with pt.     She has been advised and states understanding of medication change while on ABX.   Pt agrees with plan.

## 2024-05-17 ENCOUNTER — HOSPITAL ENCOUNTER (INPATIENT)
Facility: HOSPITAL | Age: 79
LOS: 3 days | Discharge: HOME | DRG: 690 | End: 2024-05-20
Attending: STUDENT IN AN ORGANIZED HEALTH CARE EDUCATION/TRAINING PROGRAM | Admitting: FAMILY MEDICINE
Payer: MEDICARE

## 2024-05-17 ENCOUNTER — APPOINTMENT (OUTPATIENT)
Facility: HOSPITAL | Age: 79
DRG: 690 | End: 2024-05-17
Payer: MEDICARE

## 2024-05-17 DIAGNOSIS — R53.1 GENERALIZED WEAKNESS: Primary | ICD-10-CM

## 2024-05-17 DIAGNOSIS — Z16.24 MULTIPLE DRUG RESISTANT ORGANISM (MDRO) CULTURE POSITIVE: ICD-10-CM

## 2024-05-17 DIAGNOSIS — N39.0 URINARY TRACT INFECTION WITHOUT HEMATURIA, SITE UNSPECIFIED: ICD-10-CM

## 2024-05-17 PROBLEM — Z16.12 INFECTION DUE TO ESBL-PRODUCING ESCHERICHIA COLI: Status: ACTIVE | Noted: 2024-05-17

## 2024-05-17 PROBLEM — D69.6 THROMBOCYTOPENIA (HCC): Status: ACTIVE | Noted: 2024-05-17

## 2024-05-17 PROBLEM — E87.20 LACTIC ACIDOSIS: Status: ACTIVE | Noted: 2024-05-17

## 2024-05-17 PROBLEM — I35.0 NONRHEUMATIC AORTIC (VALVE) STENOSIS: Status: ACTIVE | Noted: 2024-05-17

## 2024-05-17 PROBLEM — A49.8 INFECTION DUE TO ESBL-PRODUCING ESCHERICHIA COLI: Status: ACTIVE | Noted: 2024-05-17

## 2024-05-17 PROBLEM — N18.31 STAGE 3A CHRONIC KIDNEY DISEASE (HCC): Status: ACTIVE | Noted: 2024-03-22

## 2024-05-17 LAB
ALBUMIN SERPL-MCNC: 4.6 G/DL (ref 3.5–5)
ALBUMIN/GLOB SERPL: 1 (ref 1.1–2.2)
ALP SERPL-CCNC: 72 U/L (ref 45–117)
ALT SERPL-CCNC: 19 U/L (ref 12–78)
ANION GAP SERPL CALC-SCNC: 8 MMOL/L (ref 5–15)
APPEARANCE UR: CLEAR
AST SERPL-CCNC: 17 U/L (ref 15–37)
BACTERIA URNS QL MICRO: NEGATIVE /HPF
BASOPHILS # BLD: 0.1 K/UL (ref 0–0.1)
BASOPHILS NFR BLD: 1 % (ref 0–1)
BILIRUB SERPL-MCNC: 1.2 MG/DL (ref 0.2–1)
BILIRUB UR QL: NEGATIVE
BUN SERPL-MCNC: 17 MG/DL (ref 6–20)
BUN/CREAT SERPL: 11 (ref 12–20)
CALCIUM SERPL-MCNC: 9.6 MG/DL (ref 8.5–10.1)
CHLORIDE SERPL-SCNC: 106 MMOL/L (ref 97–108)
CO2 SERPL-SCNC: 23 MMOL/L (ref 21–32)
COLOR UR: ABNORMAL
CREAT SERPL-MCNC: 1.53 MG/DL (ref 0.55–1.02)
DIFFERENTIAL METHOD BLD: ABNORMAL
EOSINOPHIL # BLD: 0 K/UL (ref 0–0.4)
EOSINOPHIL NFR BLD: 0 % (ref 0–7)
EPITH CASTS URNS QL MICRO: ABNORMAL /LPF
ERYTHROCYTE [DISTWIDTH] IN BLOOD BY AUTOMATED COUNT: 12.4 % (ref 11.5–14.5)
GLOBULIN SER CALC-MCNC: 4.5 G/DL (ref 2–4)
GLUCOSE SERPL-MCNC: 167 MG/DL (ref 65–100)
GLUCOSE UR STRIP.AUTO-MCNC: NEGATIVE MG/DL
HCT VFR BLD AUTO: 39.4 % (ref 35–47)
HGB BLD-MCNC: 14 G/DL (ref 11.5–16)
HGB UR QL STRIP: NEGATIVE
HYALINE CASTS URNS QL MICRO: ABNORMAL /LPF (ref 0–2)
IMM GRANULOCYTES # BLD AUTO: 0.1 K/UL (ref 0–0.04)
IMM GRANULOCYTES NFR BLD AUTO: 1 % (ref 0–0.5)
KETONES UR QL STRIP.AUTO: NEGATIVE MG/DL
LACTATE BLD-SCNC: 3.17 MMOL/L (ref 0.4–2)
LACTATE SERPL-SCNC: 1.3 MMOL/L (ref 0.4–2)
LACTATE SERPL-SCNC: 3.9 MMOL/L (ref 0.4–2)
LEUKOCYTE ESTERASE UR QL STRIP.AUTO: ABNORMAL
LYMPHOCYTES # BLD: 0.7 K/UL (ref 0.8–3.5)
LYMPHOCYTES NFR BLD: 12 % (ref 12–49)
MCH RBC QN AUTO: 30.6 PG (ref 26–34)
MCHC RBC AUTO-ENTMCNC: 35.5 G/DL (ref 30–36.5)
MCV RBC AUTO: 86.2 FL (ref 80–99)
MONOCYTES # BLD: 0.2 K/UL (ref 0–1)
MONOCYTES NFR BLD: 4 % (ref 5–13)
NEUTS SEG # BLD: 4.6 K/UL (ref 1.8–8)
NEUTS SEG NFR BLD: 82 % (ref 32–75)
NITRITE UR QL STRIP.AUTO: NEGATIVE
NRBC # BLD: 0 K/UL (ref 0–0.01)
NRBC BLD-RTO: 0 PER 100 WBC
PH UR STRIP: 8 (ref 5–8)
PLATELET # BLD AUTO: 129 K/UL (ref 150–400)
PMV BLD AUTO: 10.4 FL (ref 8.9–12.9)
POTASSIUM SERPL-SCNC: 3.6 MMOL/L (ref 3.5–5.1)
PROT SERPL-MCNC: 9.1 G/DL (ref 6.4–8.2)
PROT UR STRIP-MCNC: ABNORMAL MG/DL
RBC # BLD AUTO: 4.57 M/UL (ref 3.8–5.2)
RBC #/AREA URNS HPF: ABNORMAL /HPF (ref 0–5)
RBC MORPH BLD: ABNORMAL
SODIUM SERPL-SCNC: 137 MMOL/L (ref 136–145)
SP GR UR REFRACTOMETRY: 1.01 (ref 1–1.03)
SPECIMEN HOLD: NORMAL
UROBILINOGEN UR QL STRIP.AUTO: 1 EU/DL (ref 0.2–1)
WBC # BLD AUTO: 5.7 K/UL (ref 3.6–11)
WBC MORPH BLD: ABNORMAL
WBC URNS QL MICRO: ABNORMAL /HPF (ref 0–4)

## 2024-05-17 PROCEDURE — 97116 GAIT TRAINING THERAPY: CPT

## 2024-05-17 PROCEDURE — 99285 EMERGENCY DEPT VISIT HI MDM: CPT

## 2024-05-17 PROCEDURE — 96374 THER/PROPH/DIAG INJ IV PUSH: CPT

## 2024-05-17 PROCEDURE — 93005 ELECTROCARDIOGRAM TRACING: CPT

## 2024-05-17 PROCEDURE — 6360000002 HC RX W HCPCS: Performed by: STUDENT IN AN ORGANIZED HEALTH CARE EDUCATION/TRAINING PROGRAM

## 2024-05-17 PROCEDURE — 74177 CT ABD & PELVIS W/CONTRAST: CPT

## 2024-05-17 PROCEDURE — 6370000000 HC RX 637 (ALT 250 FOR IP)

## 2024-05-17 PROCEDURE — 97161 PT EVAL LOW COMPLEX 20 MIN: CPT

## 2024-05-17 PROCEDURE — 6360000004 HC RX CONTRAST MEDICATION: Performed by: FAMILY MEDICINE

## 2024-05-17 PROCEDURE — A4216 STERILE WATER/SALINE, 10 ML: HCPCS | Performed by: STUDENT IN AN ORGANIZED HEALTH CARE EDUCATION/TRAINING PROGRAM

## 2024-05-17 PROCEDURE — 97530 THERAPEUTIC ACTIVITIES: CPT

## 2024-05-17 PROCEDURE — 83605 ASSAY OF LACTIC ACID: CPT

## 2024-05-17 PROCEDURE — 96375 TX/PRO/DX INJ NEW DRUG ADDON: CPT

## 2024-05-17 PROCEDURE — 6360000002 HC RX W HCPCS

## 2024-05-17 PROCEDURE — 94761 N-INVAS EAR/PLS OXIMETRY MLT: CPT

## 2024-05-17 PROCEDURE — 85025 COMPLETE CBC W/AUTO DIFF WBC: CPT

## 2024-05-17 PROCEDURE — 2580000003 HC RX 258

## 2024-05-17 PROCEDURE — 80053 COMPREHEN METABOLIC PANEL: CPT

## 2024-05-17 PROCEDURE — 99222 1ST HOSP IP/OBS MODERATE 55: CPT | Performed by: FAMILY MEDICINE

## 2024-05-17 PROCEDURE — 2580000003 HC RX 258: Performed by: STUDENT IN AN ORGANIZED HEALTH CARE EDUCATION/TRAINING PROGRAM

## 2024-05-17 PROCEDURE — 87040 BLOOD CULTURE FOR BACTERIA: CPT

## 2024-05-17 PROCEDURE — 81001 URINALYSIS AUTO W/SCOPE: CPT

## 2024-05-17 PROCEDURE — 1100000000 HC RM PRIVATE

## 2024-05-17 PROCEDURE — 87086 URINE CULTURE/COLONY COUNT: CPT

## 2024-05-17 PROCEDURE — 36415 COLL VENOUS BLD VENIPUNCTURE: CPT

## 2024-05-17 PROCEDURE — 2500000003 HC RX 250 WO HCPCS: Performed by: STUDENT IN AN ORGANIZED HEALTH CARE EDUCATION/TRAINING PROGRAM

## 2024-05-17 RX ORDER — ONDANSETRON 2 MG/ML
4 INJECTION INTRAMUSCULAR; INTRAVENOUS EVERY 6 HOURS PRN
Status: DISCONTINUED | OUTPATIENT
Start: 2024-05-17 | End: 2024-05-20 | Stop reason: HOSPADM

## 2024-05-17 RX ORDER — 0.9 % SODIUM CHLORIDE 0.9 %
1000 INTRAVENOUS SOLUTION INTRAVENOUS ONCE
Status: COMPLETED | OUTPATIENT
Start: 2024-05-17 | End: 2024-05-17

## 2024-05-17 RX ORDER — LISINOPRIL 20 MG/1
20 TABLET ORAL DAILY
Status: DISCONTINUED | OUTPATIENT
Start: 2024-05-18 | End: 2024-05-20 | Stop reason: HOSPADM

## 2024-05-17 RX ORDER — ACETAMINOPHEN 325 MG/1
650 TABLET ORAL EVERY 6 HOURS PRN
Status: DISCONTINUED | OUTPATIENT
Start: 2024-05-17 | End: 2024-05-20 | Stop reason: HOSPADM

## 2024-05-17 RX ORDER — LANOLIN ALCOHOL/MO/W.PET/CERES
3 CREAM (GRAM) TOPICAL NIGHTLY PRN
Status: DISCONTINUED | OUTPATIENT
Start: 2024-05-17 | End: 2024-05-20 | Stop reason: HOSPADM

## 2024-05-17 RX ORDER — ONDANSETRON 4 MG/1
4 TABLET, ORALLY DISINTEGRATING ORAL EVERY 8 HOURS PRN
Status: DISCONTINUED | OUTPATIENT
Start: 2024-05-17 | End: 2024-05-20 | Stop reason: HOSPADM

## 2024-05-17 RX ORDER — ENOXAPARIN SODIUM 100 MG/ML
30 INJECTION SUBCUTANEOUS DAILY
Status: DISCONTINUED | OUTPATIENT
Start: 2024-05-17 | End: 2024-05-19

## 2024-05-17 RX ORDER — ONDANSETRON 2 MG/ML
4 INJECTION INTRAMUSCULAR; INTRAVENOUS EVERY 6 HOURS PRN
Status: DISCONTINUED | OUTPATIENT
Start: 2024-05-17 | End: 2024-05-17

## 2024-05-17 RX ORDER — AMLODIPINE BESYLATE 5 MG/1
5 TABLET ORAL DAILY
Status: DISCONTINUED | OUTPATIENT
Start: 2024-05-17 | End: 2024-05-20 | Stop reason: HOSPADM

## 2024-05-17 RX ORDER — 0.9 % SODIUM CHLORIDE 0.9 %
500 INTRAVENOUS SOLUTION INTRAVENOUS ONCE
Status: DISCONTINUED | OUTPATIENT
Start: 2024-05-17 | End: 2024-05-17

## 2024-05-17 RX ORDER — ALPRAZOLAM 0.5 MG/1
0.5 TABLET ORAL NIGHTLY PRN
Status: DISCONTINUED | OUTPATIENT
Start: 2024-05-17 | End: 2024-05-19

## 2024-05-17 RX ORDER — POLYETHYLENE GLYCOL 3350 17 G/17G
17 POWDER, FOR SOLUTION ORAL DAILY PRN
Status: DISCONTINUED | OUTPATIENT
Start: 2024-05-17 | End: 2024-05-20 | Stop reason: HOSPADM

## 2024-05-17 RX ORDER — HYDROXYZINE HYDROCHLORIDE 10 MG/1
25 TABLET, FILM COATED ORAL NIGHTLY PRN
Status: DISCONTINUED | OUTPATIENT
Start: 2024-05-17 | End: 2024-05-20 | Stop reason: HOSPADM

## 2024-05-17 RX ORDER — ACETAMINOPHEN 650 MG/1
650 SUPPOSITORY RECTAL EVERY 6 HOURS PRN
Status: DISCONTINUED | OUTPATIENT
Start: 2024-05-17 | End: 2024-05-20 | Stop reason: HOSPADM

## 2024-05-17 RX ORDER — SODIUM CHLORIDE 0.9 % (FLUSH) 0.9 %
5-40 SYRINGE (ML) INJECTION EVERY 12 HOURS SCHEDULED
Status: DISCONTINUED | OUTPATIENT
Start: 2024-05-17 | End: 2024-05-20 | Stop reason: HOSPADM

## 2024-05-17 RX ORDER — METOCLOPRAMIDE 10 MG/1
5 TABLET ORAL DAILY
Status: DISCONTINUED | OUTPATIENT
Start: 2024-05-18 | End: 2024-05-19

## 2024-05-17 RX ORDER — SODIUM CHLORIDE 9 MG/ML
INJECTION, SOLUTION INTRAVENOUS PRN
Status: DISCONTINUED | OUTPATIENT
Start: 2024-05-17 | End: 2024-05-20 | Stop reason: HOSPADM

## 2024-05-17 RX ORDER — PANTOPRAZOLE SODIUM 40 MG/1
40 TABLET, DELAYED RELEASE ORAL
Status: DISCONTINUED | OUTPATIENT
Start: 2024-05-18 | End: 2024-05-20 | Stop reason: HOSPADM

## 2024-05-17 RX ORDER — SODIUM CHLORIDE 0.9 % (FLUSH) 0.9 %
5-40 SYRINGE (ML) INJECTION PRN
Status: DISCONTINUED | OUTPATIENT
Start: 2024-05-17 | End: 2024-05-20 | Stop reason: HOSPADM

## 2024-05-17 RX ORDER — ONDANSETRON 2 MG/ML
4 INJECTION INTRAMUSCULAR; INTRAVENOUS ONCE
Status: COMPLETED | OUTPATIENT
Start: 2024-05-17 | End: 2024-05-17

## 2024-05-17 RX ADMIN — PIPERACILLIN AND TAZOBACTAM 4500 MG: 4; .5 INJECTION, POWDER, LYOPHILIZED, FOR SOLUTION INTRAVENOUS; PARENTERAL at 11:21

## 2024-05-17 RX ADMIN — SODIUM CHLORIDE 1000 ML: 9 INJECTION, SOLUTION INTRAVENOUS at 07:43

## 2024-05-17 RX ADMIN — SODIUM CHLORIDE, PRESERVATIVE FREE 10 ML: 5 INJECTION INTRAVENOUS at 20:03

## 2024-05-17 RX ADMIN — AMLODIPINE BESYLATE 5 MG: 5 TABLET ORAL at 12:46

## 2024-05-17 RX ADMIN — IOPAMIDOL 100 ML: 755 INJECTION, SOLUTION INTRAVENOUS at 22:07

## 2024-05-17 RX ADMIN — FAMOTIDINE 40 MG: 10 INJECTION, SOLUTION INTRAVENOUS at 07:45

## 2024-05-17 RX ADMIN — SODIUM CHLORIDE 1000 ML: 9 INJECTION, SOLUTION INTRAVENOUS at 11:21

## 2024-05-17 RX ADMIN — PIPERACILLIN AND TAZOBACTAM 3375 MG: 3; .375 INJECTION, POWDER, LYOPHILIZED, FOR SOLUTION INTRAVENOUS at 19:06

## 2024-05-17 RX ADMIN — HYDROXYZINE HYDROCHLORIDE 25 MG: 10 TABLET ORAL at 23:19

## 2024-05-17 RX ADMIN — Medication 3 MG: at 23:19

## 2024-05-17 RX ADMIN — ALPRAZOLAM 0.5 MG: 0.5 TABLET ORAL at 23:20

## 2024-05-17 RX ADMIN — ACETAMINOPHEN 650 MG: 325 TABLET ORAL at 12:04

## 2024-05-17 RX ADMIN — ENOXAPARIN SODIUM 30 MG: 100 INJECTION SUBCUTANEOUS at 12:46

## 2024-05-17 RX ADMIN — ONDANSETRON 4 MG: 2 INJECTION INTRAMUSCULAR; INTRAVENOUS at 07:57

## 2024-05-17 RX ADMIN — SODIUM CHLORIDE 5 ML/HR: 9 INJECTION, SOLUTION INTRAVENOUS at 19:05

## 2024-05-17 ASSESSMENT — PAIN - FUNCTIONAL ASSESSMENT
PAIN_FUNCTIONAL_ASSESSMENT: 0-10
PAIN_FUNCTIONAL_ASSESSMENT: PREVENTS OR INTERFERES SOME ACTIVE ACTIVITIES AND ADLS

## 2024-05-17 ASSESSMENT — PAIN DESCRIPTION - DESCRIPTORS
DESCRIPTORS: SPASM
DESCRIPTORS: SPASM
DESCRIPTORS: THROBBING
DESCRIPTORS: SPASM

## 2024-05-17 ASSESSMENT — PAIN DESCRIPTION - FREQUENCY
FREQUENCY: CONTINUOUS
FREQUENCY: INTERMITTENT

## 2024-05-17 ASSESSMENT — PAIN DESCRIPTION - ORIENTATION
ORIENTATION: RIGHT;LEFT;LOWER
ORIENTATION: RIGHT;LEFT;LOWER
ORIENTATION: LEFT;RIGHT
ORIENTATION: LOWER

## 2024-05-17 ASSESSMENT — PAIN SCALES - GENERAL
PAINLEVEL_OUTOF10: 6
PAINLEVEL_OUTOF10: 0
PAINLEVEL_OUTOF10: 3
PAINLEVEL_OUTOF10: 1
PAINLEVEL_OUTOF10: 8

## 2024-05-17 ASSESSMENT — PAIN DESCRIPTION - LOCATION
LOCATION: PELVIS
LOCATION: ABDOMEN

## 2024-05-17 ASSESSMENT — PAIN DESCRIPTION - PAIN TYPE
TYPE: ACUTE PAIN
TYPE: ACUTE PAIN

## 2024-05-17 ASSESSMENT — PAIN DESCRIPTION - ONSET
ONSET: PROGRESSIVE
ONSET: PROGRESSIVE

## 2024-05-17 NOTE — PLAN OF CARE
Problem: Physical Therapy - Adult  Goal: By Discharge: Performs mobility at highest level of function for planned discharge setting.  See evaluation for individualized goals.  Description: FUNCTIONAL STATUS PRIOR TO ADMISSION: Patient did not use and AD in the home and was modified independent using a single point cane for functional mobility in the community.    HOME SUPPORT PRIOR TO ADMISSION: The patient lived with her  but did not require assistance.    Physical Therapy Goals  Initiated 5/17/2024  1.  Patient will move from supine to sit and sit to supine, scoot up and down, and roll side to side in bed with modified independence within 7 day(s).    2.  Patient will perform sit to stand with modified independence within 7 day(s).  3.  Patient will transfer from bed to chair and chair to bed with modified independence using the least restrictive device within 7 day(s).  4.  Patient will ambulate with modified independence for 150 feet with the least restrictive device within 7 day(s).   5.  Patient will ascend/descend 5 stairs with 2 handrail(s) with modified independence within 7 day(s).    Outcome: Progressing   PHYSICAL THERAPY EVALUATION    Patient: Patricia Huston (79 y.o. female)  Date: 5/17/2024  Primary Diagnosis: Generalized weakness [R53.1]       Precautions:                        ASSESSMENT :   DEFICITS/IMPAIRMENTS:   The patient is limited by decreased functional mobility, strength, activity tolerance, endurance, balance, increased pain levels, and gait dysfunction. Patient received in ED, overall doing well but reporting general malaisse and pubic pain.  SUP for bed mob, came tos tanding with high guard and reaching for support.  CGA with gait without AD to bathroom, obtained RW for return trip with much improved mechanics, step length and kenan.  Occasional cues for safe use of RW needed but no significant safety concerns. VSS with all activity.      Based on the impairments listed

## 2024-05-17 NOTE — ED PROVIDER NOTES
HPI    79 y.o. female presenting with suprapubic discomfort, urinary frequency, malaise and nausea.  She has had a long history of recurrent UTIs and has had MDR organisms isolated, most recently had ESBL E. coli.  She had started taking trimethoprim for new dysuria that started in the last week.  This was prescribed by her urologist.  Her urologist at [facility] has her culture results she states.  She has not had fevers per se but has had intermittent chills.    .    Patricia Huston   has a past medical history of Depression, Hypertension, Kidney stone, and Sepsis ([facility]).       Physical Exam  Vitals reviewed.   Constitutional:       General: She is not in acute distress.     Appearance: Normal appearance. She is ill-appearing.   HENT:      Head: Normocephalic.      Mouth/Throat:      Mouth: Mucous membranes are moist.   Eyes:      Extraocular Movements: Extraocular movements intact.      Pupils: Pupils are equal, round, and reactive to light.   Cardiovascular:      Rate and Rhythm: Normal rate and regular rhythm.      Pulses: Normal pulses.   Pulmonary:      Effort: Pulmonary effort is normal. No respiratory distress.   Abdominal:      General: Abdomen is flat.      Tenderness: There is abdominal tenderness (suprapubic). There is no guarding or rebound.   Musculoskeletal:      Cervical back: Neck supple. No rigidity.      Right lower leg: No edema.      Left lower leg: No edema.   Skin:     General: Skin is warm.      Capillary Refill: Capillary refill takes less than 2 seconds.   Neurological:      General: No focal deficit present.      Mental Status: She is alert. Mental status is at baseline.   Psychiatric:         Mood and Affect: Mood normal.           LABORATORY TESTS:  Labs Reviewed   CBC WITH AUTO DIFFERENTIAL - Abnormal; Notable for the following components:       Result Value    Platelets 129 (*)     Neutrophils % 82 (*)     Monocytes % 4 (*)     Immature Granulocytes % 1 (*)     Lymphocytes

## 2024-05-17 NOTE — H&P
[id number] [id number]   Office [phone], Fax [phone]        Admission H&P     Date of admission: 5/17/2024    Patient name: Patricia Huston  MRN: [id number]  YOB: 1945  Age: 79 y.o.     Primary care provider:  Marcos Edwards MD     Source of Information: patient, medical records    Chief complaint:  abdominal pain    History of Present Illness  Patricia Huston is a 79 y.o. female with a known history of recurrent UTI with ESBL UTI 03/2024, HTN, GERD, depression/anxiety,  who presented to the ED complaining of abdominal pain and UTI. Patient saw urologist 1 week ago and was diagnosed with UTI and started on bactrim. She has taken 3 tablets so far but continues to experience chills, worsening abdominal pain, generalized weakness and nausea. She denies fevers at home. Urine culture obtained from [facility] - ESBL E.coli sensitive to Zosyn. Started on Zosyn in the ER.    In the ED:   Vitals: 97.7, , /87, RR 18, 96% on RA  Labs: , Cr 1.53 (BL 1.2), LA 3.9>3.1, UA wnl  Imaging: none  EKG: none  Treatment: pepcid, zofran, 2L NS    Review of Systems  Review of Systems   See HPI    Home Medications   Prior to Admission medications    Medication Sig Start Date End Date Taking? Authorizing Provider   estradiol (ESTRACE) 0.1 MG/GM vaginal cream Place 2 g vaginally three times a week    Carmel Campbell MD   sertraline (ZOLOFT) 100 MG tablet TAKE ONE TABLET BY MOUTH ONE TIME DAILY 3/21/24   Marcos Edwards MD   estradiol (VIVELLE) 0.1 MG/24HR Place 1 patch onto the skin Twice a Week  Patient not taking: Reported on 5/9/2024    Carmel Campbell MD   ALPRAZolam (XANAX) 0.5 MG tablet Take by mouth.    Automatic Reconciliation, Ar   amLODIPine (NORVASC) 5 MG tablet Take 1 tablet by mouth daily    Automatic Reconciliation, Ar   aspirin 81 MG chewable tablet Take 1 tablet by mouth daily  Patient not taking: Reported on 3/20/2024 9/20/22   Automatic

## 2024-05-17 NOTE — ED TRIAGE NOTES
Patient arrives to the ED with complaints of feeling weak after taking too much of her antibiotic for her bladder infection last night. Patient reports she has frequent bladder infections but has been feeling \"sick\", patient reports vomiting in the bathroom before triage and normally walks with a walking stick but feels like she's unable to walk very far. Denies fever, pt reports she is resistant to several antibiotics.

## 2024-05-17 NOTE — ED NOTES
Notified Dr. Stein of elevated lactic acid. Code sepsis not called at this time due to patient not meeting SIRS criteria currently. Dr. Stein aware and agreed.

## 2024-05-17 NOTE — CARE COORDINATION
05/17/24 12:01 PM  CM consult for discharge planning noted.  Spoke with attending, will await PT/OT recommendations.  RADHA Glasgow

## 2024-05-18 PROBLEM — K57.30 COLON, DIVERTICULOSIS: Status: ACTIVE | Noted: 2024-05-18

## 2024-05-18 LAB
ALBUMIN SERPL-MCNC: 3.9 G/DL (ref 3.5–5)
ALBUMIN/GLOB SERPL: 1.1 (ref 1.1–2.2)
ALP SERPL-CCNC: 56 U/L (ref 45–117)
ALT SERPL-CCNC: 17 U/L (ref 12–78)
ANION GAP SERPL CALC-SCNC: 4 MMOL/L (ref 5–15)
AST SERPL-CCNC: 12 U/L (ref 15–37)
BASOPHILS # BLD: 0 K/UL (ref 0–0.1)
BASOPHILS NFR BLD: 0 % (ref 0–1)
BILIRUB SERPL-MCNC: 0.9 MG/DL (ref 0.2–1)
BUN SERPL-MCNC: 14 MG/DL (ref 6–20)
BUN/CREAT SERPL: 12 (ref 12–20)
CALCIUM SERPL-MCNC: 8.7 MG/DL (ref 8.5–10.1)
CHLORIDE SERPL-SCNC: 111 MMOL/L (ref 97–108)
CO2 SERPL-SCNC: 24 MMOL/L (ref 21–32)
CREAT SERPL-MCNC: 1.16 MG/DL (ref 0.55–1.02)
DIFFERENTIAL METHOD BLD: ABNORMAL
EOSINOPHIL # BLD: 0 K/UL (ref 0–0.4)
EOSINOPHIL NFR BLD: 0 % (ref 0–7)
ERYTHROCYTE [DISTWIDTH] IN BLOOD BY AUTOMATED COUNT: 12.5 % (ref 11.5–14.5)
GLOBULIN SER CALC-MCNC: 3.4 G/DL (ref 2–4)
GLUCOSE SERPL-MCNC: 109 MG/DL (ref 65–100)
HCT VFR BLD AUTO: 34.2 % (ref 35–47)
HGB BLD-MCNC: 11.9 G/DL (ref 11.5–16)
IMM GRANULOCYTES # BLD AUTO: 0 K/UL (ref 0–0.04)
IMM GRANULOCYTES NFR BLD AUTO: 0 % (ref 0–0.5)
LYMPHOCYTES # BLD: 1 K/UL (ref 0.8–3.5)
LYMPHOCYTES NFR BLD: 13 % (ref 12–49)
MAGNESIUM SERPL-MCNC: 1.9 MG/DL (ref 1.6–2.4)
MCH RBC QN AUTO: 30.5 PG (ref 26–34)
MCHC RBC AUTO-ENTMCNC: 34.8 G/DL (ref 30–36.5)
MCV RBC AUTO: 87.7 FL (ref 80–99)
MONOCYTES # BLD: 0.3 K/UL (ref 0–1)
MONOCYTES NFR BLD: 5 % (ref 5–13)
NEUTS SEG # BLD: 6.2 K/UL (ref 1.8–8)
NEUTS SEG NFR BLD: 82 % (ref 32–75)
NRBC # BLD: 0 K/UL (ref 0–0.01)
NRBC BLD-RTO: 0 PER 100 WBC
PLATELET # BLD AUTO: 117 K/UL (ref 150–400)
PMV BLD AUTO: 10.2 FL (ref 8.9–12.9)
POTASSIUM SERPL-SCNC: 3.5 MMOL/L (ref 3.5–5.1)
PROT SERPL-MCNC: 7.3 G/DL (ref 6.4–8.2)
RBC # BLD AUTO: 3.9 M/UL (ref 3.8–5.2)
SODIUM SERPL-SCNC: 139 MMOL/L (ref 136–145)
WBC # BLD AUTO: 7.6 K/UL (ref 3.6–11)

## 2024-05-18 PROCEDURE — 6360000002 HC RX W HCPCS

## 2024-05-18 PROCEDURE — 99232 SBSQ HOSP IP/OBS MODERATE 35: CPT | Performed by: FAMILY MEDICINE

## 2024-05-18 PROCEDURE — 6370000000 HC RX 637 (ALT 250 FOR IP)

## 2024-05-18 PROCEDURE — 94761 N-INVAS EAR/PLS OXIMETRY MLT: CPT

## 2024-05-18 PROCEDURE — 2580000003 HC RX 258

## 2024-05-18 PROCEDURE — 80053 COMPREHEN METABOLIC PANEL: CPT

## 2024-05-18 PROCEDURE — 36415 COLL VENOUS BLD VENIPUNCTURE: CPT

## 2024-05-18 PROCEDURE — 83735 ASSAY OF MAGNESIUM: CPT

## 2024-05-18 PROCEDURE — 85025 COMPLETE CBC W/AUTO DIFF WBC: CPT

## 2024-05-18 PROCEDURE — 1100000000 HC RM PRIVATE

## 2024-05-18 RX ORDER — LACTOBACILLUS RHAMNOSUS GG 10B CELL
1 CAPSULE ORAL
Status: DISCONTINUED | OUTPATIENT
Start: 2024-05-19 | End: 2024-05-20 | Stop reason: HOSPADM

## 2024-05-18 RX ADMIN — PIPERACILLIN AND TAZOBACTAM 3375 MG: 3; .375 INJECTION, POWDER, LYOPHILIZED, FOR SOLUTION INTRAVENOUS at 18:29

## 2024-05-18 RX ADMIN — PIPERACILLIN AND TAZOBACTAM 3375 MG: 3; .375 INJECTION, POWDER, LYOPHILIZED, FOR SOLUTION INTRAVENOUS at 01:35

## 2024-05-18 RX ADMIN — PIPERACILLIN AND TAZOBACTAM 3375 MG: 3; .375 INJECTION, POWDER, LYOPHILIZED, FOR SOLUTION INTRAVENOUS at 10:24

## 2024-05-18 RX ADMIN — AMLODIPINE BESYLATE 5 MG: 5 TABLET ORAL at 08:30

## 2024-05-18 RX ADMIN — SODIUM CHLORIDE, PRESERVATIVE FREE 10 ML: 5 INJECTION INTRAVENOUS at 22:39

## 2024-05-18 RX ADMIN — ENOXAPARIN SODIUM 30 MG: 100 INJECTION SUBCUTANEOUS at 08:37

## 2024-05-18 RX ADMIN — SERTRALINE 100 MG: 50 TABLET, FILM COATED ORAL at 08:30

## 2024-05-18 RX ADMIN — METOCLOPRAMIDE 5 MG: 10 TABLET ORAL at 08:30

## 2024-05-18 RX ADMIN — PANTOPRAZOLE SODIUM 40 MG: 40 TABLET, DELAYED RELEASE ORAL at 05:32

## 2024-05-18 RX ADMIN — LISINOPRIL 20 MG: 20 TABLET ORAL at 08:30

## 2024-05-18 RX ADMIN — SODIUM CHLORIDE, PRESERVATIVE FREE 10 ML: 5 INJECTION INTRAVENOUS at 10:05

## 2024-05-18 ASSESSMENT — PAIN SCALES - GENERAL
PAINLEVEL_OUTOF10: 0
PAINLEVEL_OUTOF10: 4
PAINLEVEL_OUTOF10: 0
PAINLEVEL_OUTOF10: 2

## 2024-05-18 ASSESSMENT — PAIN DESCRIPTION - LOCATION: LOCATION: ABDOMEN

## 2024-05-18 NOTE — PLAN OF CARE
Problem: Physical Therapy - Adult  Goal: By Discharge: Performs mobility at highest level of function for planned discharge setting.  See evaluation for individualized goals.  Description: FUNCTIONAL STATUS PRIOR TO ADMISSION: Patient did not use and AD in the home and was modified independent using a single point cane for functional mobility in the community.    HOME SUPPORT PRIOR TO ADMISSION: The patient lived with her  but did not require assistance.    Physical Therapy Goals  Initiated 5/17/2024  1.  Patient will move from supine to sit and sit to supine, scoot up and down, and roll side to side in bed with modified independence within 7 day(s).    2.  Patient will perform sit to stand with modified independence within 7 day(s).  3.  Patient will transfer from bed to chair and chair to bed with modified independence using the least restrictive device within 7 day(s).  4.  Patient will ambulate with modified independence for 150 feet with the least restrictive device within 7 day(s).   5.  Patient will ascend/descend 5 stairs with 2 handrail(s) with modified independence within 7 day(s).    5/17/2024 1525 by Michelle Mcgowan, PT  Outcome: Progressing     Problem: Pain  Goal: Verbalizes/displays adequate comfort level or baseline comfort level  Outcome: Progressing     Problem: Safety - Adult  Goal: Free from fall injury  Outcome: Progressing

## 2024-05-19 LAB
ALBUMIN SERPL-MCNC: 3.9 G/DL (ref 3.5–5)
ALBUMIN/GLOB SERPL: 1 (ref 1.1–2.2)
ALP SERPL-CCNC: 54 U/L (ref 45–117)
ALT SERPL-CCNC: 15 U/L (ref 12–78)
ANION GAP SERPL CALC-SCNC: 4 MMOL/L (ref 5–15)
AST SERPL-CCNC: 15 U/L (ref 15–37)
BACTERIA SPEC CULT: NORMAL
BASOPHILS # BLD: 0 K/UL (ref 0–0.1)
BASOPHILS NFR BLD: 0 % (ref 0–1)
BILIRUB SERPL-MCNC: 1.2 MG/DL (ref 0.2–1)
BUN SERPL-MCNC: 30 MG/DL (ref 6–20)
BUN/CREAT SERPL: 18 (ref 12–20)
CALCIUM SERPL-MCNC: 9.2 MG/DL (ref 8.5–10.1)
CC UR VC: NORMAL
CHLORIDE SERPL-SCNC: 113 MMOL/L (ref 97–108)
CO2 SERPL-SCNC: 24 MMOL/L (ref 21–32)
CREAT SERPL-MCNC: 1.71 MG/DL (ref 0.55–1.02)
DIFFERENTIAL METHOD BLD: ABNORMAL
EKG ATRIAL RATE: 77 BPM
EKG DIAGNOSIS: NORMAL
EKG P AXIS: -13 DEGREES
EKG P-R INTERVAL: 146 MS
EKG Q-T INTERVAL: 400 MS
EKG QRS DURATION: 72 MS
EKG QTC CALCULATION (BAZETT): 452 MS
EKG R AXIS: -29 DEGREES
EKG T AXIS: 156 DEGREES
EKG VENTRICULAR RATE: 77 BPM
EOSINOPHIL # BLD: 0.1 K/UL (ref 0–0.4)
EOSINOPHIL NFR BLD: 2 % (ref 0–7)
ERYTHROCYTE [DISTWIDTH] IN BLOOD BY AUTOMATED COUNT: 12.8 % (ref 11.5–14.5)
GLOBULIN SER CALC-MCNC: 3.8 G/DL (ref 2–4)
GLUCOSE SERPL-MCNC: 96 MG/DL (ref 65–100)
HCT VFR BLD AUTO: 35 % (ref 35–47)
HGB BLD-MCNC: 12.2 G/DL (ref 11.5–16)
IMM GRANULOCYTES # BLD AUTO: 0 K/UL (ref 0–0.04)
IMM GRANULOCYTES NFR BLD AUTO: 0 % (ref 0–0.5)
LYMPHOCYTES # BLD: 1.8 K/UL (ref 0.8–3.5)
LYMPHOCYTES NFR BLD: 27 % (ref 12–49)
MAGNESIUM SERPL-MCNC: 2.2 MG/DL (ref 1.6–2.4)
MCH RBC QN AUTO: 30.6 PG (ref 26–34)
MCHC RBC AUTO-ENTMCNC: 34.9 G/DL (ref 30–36.5)
MCV RBC AUTO: 87.7 FL (ref 80–99)
MONOCYTES # BLD: 0.5 K/UL (ref 0–1)
MONOCYTES NFR BLD: 7 % (ref 5–13)
NEUTS SEG # BLD: 4.3 K/UL (ref 1.8–8)
NEUTS SEG NFR BLD: 63 % (ref 32–75)
NRBC # BLD: 0 K/UL (ref 0–0.01)
NRBC BLD-RTO: 0 PER 100 WBC
PERIPHERAL SMEAR, MD REVIEW: NORMAL
PLATELET # BLD AUTO: 120 K/UL (ref 150–400)
PMV BLD AUTO: 10.4 FL (ref 8.9–12.9)
POTASSIUM SERPL-SCNC: 3.3 MMOL/L (ref 3.5–5.1)
PROT SERPL-MCNC: 7.7 G/DL (ref 6.4–8.2)
RBC # BLD AUTO: 3.99 M/UL (ref 3.8–5.2)
SERVICE CMNT-IMP: NORMAL
SODIUM SERPL-SCNC: 141 MMOL/L (ref 136–145)
WBC # BLD AUTO: 6.8 K/UL (ref 3.6–11)

## 2024-05-19 PROCEDURE — 80053 COMPREHEN METABOLIC PANEL: CPT

## 2024-05-19 PROCEDURE — 6370000000 HC RX 637 (ALT 250 FOR IP): Performed by: STUDENT IN AN ORGANIZED HEALTH CARE EDUCATION/TRAINING PROGRAM

## 2024-05-19 PROCEDURE — 2580000003 HC RX 258

## 2024-05-19 PROCEDURE — 36415 COLL VENOUS BLD VENIPUNCTURE: CPT

## 2024-05-19 PROCEDURE — 6370000000 HC RX 637 (ALT 250 FOR IP)

## 2024-05-19 PROCEDURE — 83735 ASSAY OF MAGNESIUM: CPT

## 2024-05-19 PROCEDURE — 99232 SBSQ HOSP IP/OBS MODERATE 35: CPT | Performed by: FAMILY MEDICINE

## 2024-05-19 PROCEDURE — 1100000000 HC RM PRIVATE

## 2024-05-19 PROCEDURE — 93010 ELECTROCARDIOGRAM REPORT: CPT | Performed by: SPECIALIST

## 2024-05-19 PROCEDURE — 6360000002 HC RX W HCPCS

## 2024-05-19 PROCEDURE — 85025 COMPLETE CBC W/AUTO DIFF WBC: CPT

## 2024-05-19 PROCEDURE — 94761 N-INVAS EAR/PLS OXIMETRY MLT: CPT

## 2024-05-19 RX ORDER — 0.9 % SODIUM CHLORIDE 0.9 %
500 INTRAVENOUS SOLUTION INTRAVENOUS ONCE
Status: COMPLETED | OUTPATIENT
Start: 2024-05-19 | End: 2024-05-19

## 2024-05-19 RX ORDER — ALPRAZOLAM 0.5 MG/1
0.5 TABLET ORAL
Status: DISCONTINUED | OUTPATIENT
Start: 2024-05-19 | End: 2024-05-20 | Stop reason: HOSPADM

## 2024-05-19 RX ORDER — METOCLOPRAMIDE 10 MG/1
5 TABLET ORAL DAILY
Status: DISCONTINUED | OUTPATIENT
Start: 2024-05-19 | End: 2024-05-20 | Stop reason: HOSPADM

## 2024-05-19 RX ORDER — METOCLOPRAMIDE 10 MG/1
5 TABLET ORAL PRN
Status: DISCONTINUED | OUTPATIENT
Start: 2024-05-19 | End: 2024-05-19

## 2024-05-19 RX ORDER — HEPARIN SODIUM 5000 [USP'U]/ML
5000 INJECTION, SOLUTION INTRAVENOUS; SUBCUTANEOUS EVERY 8 HOURS
Status: DISCONTINUED | OUTPATIENT
Start: 2024-05-19 | End: 2024-05-20 | Stop reason: HOSPADM

## 2024-05-19 RX ADMIN — POTASSIUM BICARBONATE 40 MEQ: 782 TABLET, EFFERVESCENT ORAL at 10:08

## 2024-05-19 RX ADMIN — PIPERACILLIN AND TAZOBACTAM 3375 MG: 3; .375 INJECTION, POWDER, LYOPHILIZED, FOR SOLUTION INTRAVENOUS at 10:10

## 2024-05-19 RX ADMIN — SODIUM CHLORIDE 500 ML: 9 INJECTION, SOLUTION INTRAVENOUS at 13:16

## 2024-05-19 RX ADMIN — HEPARIN SODIUM 5000 UNITS: 5000 INJECTION INTRAVENOUS; SUBCUTANEOUS at 10:11

## 2024-05-19 RX ADMIN — AMLODIPINE BESYLATE 5 MG: 5 TABLET ORAL at 08:44

## 2024-05-19 RX ADMIN — HEPARIN SODIUM 5000 UNITS: 5000 INJECTION INTRAVENOUS; SUBCUTANEOUS at 18:21

## 2024-05-19 RX ADMIN — SERTRALINE 100 MG: 50 TABLET, FILM COATED ORAL at 08:44

## 2024-05-19 RX ADMIN — METOCLOPRAMIDE 5 MG: 10 TABLET ORAL at 11:42

## 2024-05-19 RX ADMIN — ALPRAZOLAM 0.5 MG: 0.5 TABLET ORAL at 20:08

## 2024-05-19 RX ADMIN — PIPERACILLIN AND TAZOBACTAM 3375 MG: 3; .375 INJECTION, POWDER, LYOPHILIZED, FOR SOLUTION INTRAVENOUS at 02:33

## 2024-05-19 RX ADMIN — SODIUM CHLORIDE, PRESERVATIVE FREE 10 ML: 5 INJECTION INTRAVENOUS at 20:10

## 2024-05-19 RX ADMIN — LISINOPRIL 20 MG: 20 TABLET ORAL at 08:44

## 2024-05-19 RX ADMIN — PIPERACILLIN AND TAZOBACTAM 3375 MG: 3; .375 INJECTION, POWDER, LYOPHILIZED, FOR SOLUTION INTRAVENOUS at 18:17

## 2024-05-19 RX ADMIN — PANTOPRAZOLE SODIUM 40 MG: 40 TABLET, DELAYED RELEASE ORAL at 06:01

## 2024-05-19 RX ADMIN — Medication 1 CAPSULE: at 08:44

## 2024-05-19 ASSESSMENT — PAIN SCALES - GENERAL
PAINLEVEL_OUTOF10: 0
PAINLEVEL_OUTOF10: 0

## 2024-05-19 NOTE — PLAN OF CARE
Problem: Pain  Goal: Verbalizes/displays adequate comfort level or baseline comfort level  5/18/2024 2000 by Nasrin Shahid RN  Outcome: Progressing  5/18/2024 1959 by Nasrin Shahid RN  Outcome: Progressing     Problem: Safety - Adult  Goal: Free from fall injury  5/18/2024 2000 by Nasrin Shahid RN  Outcome: Progressing  5/18/2024 1959 by Nasrin Shahid RN  Outcome: Progressing

## 2024-05-20 ENCOUNTER — APPOINTMENT (OUTPATIENT)
Facility: HOSPITAL | Age: 79
DRG: 690 | End: 2024-05-20
Payer: MEDICARE

## 2024-05-20 VITALS
RESPIRATION RATE: 16 BRPM | BODY MASS INDEX: 22.15 KG/M2 | SYSTOLIC BLOOD PRESSURE: 118 MMHG | TEMPERATURE: 98.1 F | WEIGHT: 125 LBS | HEIGHT: 63 IN | DIASTOLIC BLOOD PRESSURE: 89 MMHG | OXYGEN SATURATION: 99 % | HEART RATE: 71 BPM

## 2024-05-20 PROBLEM — N39.0 URINARY TRACT INFECTION WITHOUT HEMATURIA: Status: ACTIVE | Noted: 2024-05-20

## 2024-05-20 LAB
ALBUMIN SERPL-MCNC: 3.7 G/DL (ref 3.5–5)
ALBUMIN/GLOB SERPL: 1.1 (ref 1.1–2.2)
ALP SERPL-CCNC: 49 U/L (ref 45–117)
ALT SERPL-CCNC: 18 U/L (ref 12–78)
ANION GAP SERPL CALC-SCNC: 3 MMOL/L (ref 5–15)
AST SERPL-CCNC: 14 U/L (ref 15–37)
BASOPHILS # BLD: 0 K/UL (ref 0–0.1)
BASOPHILS NFR BLD: 0 % (ref 0–1)
BILIRUB SERPL-MCNC: 0.8 MG/DL (ref 0.2–1)
BUN SERPL-MCNC: 28 MG/DL (ref 6–20)
BUN/CREAT SERPL: 24 (ref 12–20)
CALCIUM SERPL-MCNC: 9.1 MG/DL (ref 8.5–10.1)
CHLORIDE SERPL-SCNC: 114 MMOL/L (ref 97–108)
CO2 SERPL-SCNC: 25 MMOL/L (ref 21–32)
CREAT SERPL-MCNC: 1.15 MG/DL (ref 0.55–1.02)
DIFFERENTIAL METHOD BLD: ABNORMAL
EOSINOPHIL # BLD: 0.1 K/UL (ref 0–0.4)
EOSINOPHIL NFR BLD: 2 % (ref 0–7)
ERYTHROCYTE [DISTWIDTH] IN BLOOD BY AUTOMATED COUNT: 12.8 % (ref 11.5–14.5)
GLOBULIN SER CALC-MCNC: 3.3 G/DL (ref 2–4)
GLUCOSE SERPL-MCNC: 95 MG/DL (ref 65–100)
HCT VFR BLD AUTO: 32.8 % (ref 35–47)
HGB BLD-MCNC: 11.3 G/DL (ref 11.5–16)
IMM GRANULOCYTES # BLD AUTO: 0 K/UL (ref 0–0.04)
IMM GRANULOCYTES NFR BLD AUTO: 1 % (ref 0–0.5)
LYMPHOCYTES # BLD: 1.4 K/UL (ref 0.8–3.5)
LYMPHOCYTES NFR BLD: 24 % (ref 12–49)
MAGNESIUM SERPL-MCNC: 2.4 MG/DL (ref 1.6–2.4)
MCH RBC QN AUTO: 30.5 PG (ref 26–34)
MCHC RBC AUTO-ENTMCNC: 34.5 G/DL (ref 30–36.5)
MCV RBC AUTO: 88.4 FL (ref 80–99)
MONOCYTES # BLD: 0.4 K/UL (ref 0–1)
MONOCYTES NFR BLD: 7 % (ref 5–13)
NEUTS SEG # BLD: 3.8 K/UL (ref 1.8–8)
NEUTS SEG NFR BLD: 66 % (ref 32–75)
NRBC # BLD: 0 K/UL (ref 0–0.01)
NRBC BLD-RTO: 0 PER 100 WBC
PLATELET # BLD AUTO: 107 K/UL (ref 150–400)
PMV BLD AUTO: 10.6 FL (ref 8.9–12.9)
POTASSIUM SERPL-SCNC: 3.6 MMOL/L (ref 3.5–5.1)
PROT SERPL-MCNC: 7 G/DL (ref 6.4–8.2)
RBC # BLD AUTO: 3.71 M/UL (ref 3.8–5.2)
SODIUM SERPL-SCNC: 142 MMOL/L (ref 136–145)
WBC # BLD AUTO: 5.8 K/UL (ref 3.6–11)

## 2024-05-20 PROCEDURE — 6370000000 HC RX 637 (ALT 250 FOR IP): Performed by: STUDENT IN AN ORGANIZED HEALTH CARE EDUCATION/TRAINING PROGRAM

## 2024-05-20 PROCEDURE — 74018 RADEX ABDOMEN 1 VIEW: CPT

## 2024-05-20 PROCEDURE — 85025 COMPLETE CBC W/AUTO DIFF WBC: CPT

## 2024-05-20 PROCEDURE — 6370000000 HC RX 637 (ALT 250 FOR IP): Performed by: INTERNAL MEDICINE

## 2024-05-20 PROCEDURE — 99238 HOSP IP/OBS DSCHRG MGMT 30/<: CPT | Performed by: FAMILY MEDICINE

## 2024-05-20 PROCEDURE — 2580000003 HC RX 258

## 2024-05-20 PROCEDURE — 83735 ASSAY OF MAGNESIUM: CPT

## 2024-05-20 PROCEDURE — 99223 1ST HOSP IP/OBS HIGH 75: CPT | Performed by: INTERNAL MEDICINE

## 2024-05-20 PROCEDURE — 36415 COLL VENOUS BLD VENIPUNCTURE: CPT

## 2024-05-20 PROCEDURE — 80053 COMPREHEN METABOLIC PANEL: CPT

## 2024-05-20 PROCEDURE — 6360000002 HC RX W HCPCS

## 2024-05-20 PROCEDURE — 6370000000 HC RX 637 (ALT 250 FOR IP)

## 2024-05-20 RX ORDER — GRANULES FOR ORAL 3 G/1
3 POWDER ORAL ONCE
Status: COMPLETED | OUTPATIENT
Start: 2024-05-20 | End: 2024-05-20

## 2024-05-20 RX ORDER — LACTOBACILLUS RHAMNOSUS GG 10B CELL
1 CAPSULE ORAL DAILY PRN
Qty: 30 CAPSULE | Refills: 0 | Status: SHIPPED | OUTPATIENT
Start: 2024-05-20

## 2024-05-20 RX ADMIN — SERTRALINE 100 MG: 50 TABLET, FILM COATED ORAL at 11:01

## 2024-05-20 RX ADMIN — HEPARIN SODIUM 5000 UNITS: 5000 INJECTION INTRAVENOUS; SUBCUTANEOUS at 11:00

## 2024-05-20 RX ADMIN — PIPERACILLIN AND TAZOBACTAM 3375 MG: 3; .375 INJECTION, POWDER, LYOPHILIZED, FOR SOLUTION INTRAVENOUS at 01:41

## 2024-05-20 RX ADMIN — FOSFOMYCIN TROMETHAMINE 1 PACKET: 3 GRANULE, FOR SOLUTION ORAL at 12:53

## 2024-05-20 RX ADMIN — LISINOPRIL 20 MG: 20 TABLET ORAL at 11:01

## 2024-05-20 RX ADMIN — AMLODIPINE BESYLATE 5 MG: 5 TABLET ORAL at 11:01

## 2024-05-20 RX ADMIN — METOCLOPRAMIDE 5 MG: 10 TABLET ORAL at 11:01

## 2024-05-20 RX ADMIN — PANTOPRAZOLE SODIUM 40 MG: 40 TABLET, DELAYED RELEASE ORAL at 06:27

## 2024-05-20 RX ADMIN — HEPARIN SODIUM 5000 UNITS: 5000 INJECTION INTRAVENOUS; SUBCUTANEOUS at 01:41

## 2024-05-20 RX ADMIN — PIPERACILLIN AND TAZOBACTAM 3375 MG: 3; .375 INJECTION, POWDER, LYOPHILIZED, FOR SOLUTION INTRAVENOUS at 11:17

## 2024-05-20 RX ADMIN — Medication 1 CAPSULE: at 11:00

## 2024-05-20 RX ADMIN — SODIUM CHLORIDE, PRESERVATIVE FREE 10 ML: 5 INJECTION INTRAVENOUS at 11:02

## 2024-05-20 ASSESSMENT — PAIN SCALES - GENERAL
PAINLEVEL_OUTOF10: 0
PAINLEVEL_OUTOF10: 0

## 2024-05-20 NOTE — CARE COORDINATION
Care Management Initial Assessment       RUR: 13% Low  Readmission? No  1st IM letter given? Yes - 5/17/24, 2nd-5/20/24  1st  letter given: No    Patient presented to the ED on 5/17/24 with abdominal pain. She has a history of recurrent UTI with ESBL UTI 03/2024, HTN, GERD, depression/anxiety.     CM contacted patient via mobile phone to complete initial assessment. Patient verified demographic info and reports independence at baseline. CM updated in IDRs, patient is planned for DC today/tomorrow pending ID plan. Patient will need picc line placed. CM reviewed recommendations for home health PT and advised patient she will need home health SN added should she need to DC on IV ABX. Patient would like to further discuss with her  before CM places referral. CM to monitor and assist with transitional care planning needs.      05/20/24 1053   Service Assessment   Patient Orientation Alert and Oriented;Person;Place;Situation;Self   Cognition Alert   History Provided By Patient   Support Systems Spouse/Significant Other  ( Nelson Huston, [phone])   PCP Verified by CM Yes  (Dr. Marcos Edwrads)   Last Visit to PCP Within last year  (Last seen in January)   Prior Functional Level Independent in ADLs/IADLs   Current Functional Level Independent in ADLs/IADLs   Can patient return to prior living arrangement Yes   Ability to make needs known: Good   Family able to assist with home care needs: Yes   Financial Resources Medicare  ([employer])   Social/Functional History   Lives With Spouse   Type of Home House   Bathroom Equipment Toilet raiser   Home Equipment Cane;Walker - Rolling   ADL Assistance Independent   Homemaking Assistance Independent   Ambulation Assistance Independent   Transfer Assistance Independent   Mode of Transportation Car   Discharge Planning   Type of Residence House   Living Arrangements Spouse/Significant Other   Current Services Prior To Admission None   Potential Assistance Needed

## 2024-05-20 NOTE — PROGRESS NOTES
[id number] [place] [address]   Office [phone]  Fax [phone]          Subjective / Objective     Subjective  Overnight Events: Code RACHEL called. Given melatonin, hydroxyzine. Sitter now at bedside.     Patient seen and examined at bedside. Reports diarrhea which she states is intermittently chronic for her. Denies new or changed abd pain. Denies CP, SOB, N/V, dysuria.      Vitals  Height: 1.6 m (5' 3\"), Weight - Scale: 56.7 kg (125 lb), BP: (!) 93/59, Pain 0-10: Pain Level: 2;     Physical Examination:   General appearance - alert, in no distress  Chest - clear to auscultation, no wheezes, rales or rhonchi, symmetric air entry  Heart - normal rate, regular rhythm, no murmurs, rubs, clicks or gallops,   Abdomen - soft, tender to RUQ and LLQ but no guarding/rebound, nondistended, no masses or organomegaly  Neurological - alert, oriented, normal speech, no focal findings  Skin - warm, dry. No notable rashes  Extremities - no LE edema    I/O:  05/17 0701 - 05/18 0700  In: 2220 [P.O.:120]  Out: 1450 [Urine:1450]  Inpatient Medications  Current Facility-Administered Medications   Medication Dose Route Frequency    ALPRAZolam (XANAX) tablet 0.5 mg  0.5 mg Oral Nightly PRN    amLODIPine (NORVASC) tablet 5 mg  5 mg Oral Daily    lisinopril (PRINIVIL;ZESTRIL) tablet 20 mg  20 mg Oral Daily    metoclopramide (REGLAN) tablet 5 mg  5 mg Oral Daily    pantoprazole (PROTONIX) tablet 40 mg  40 mg Oral QAM AC    sertraline (ZOLOFT) tablet 100 mg  100 mg Oral Daily    sodium chloride flush 0.9 % injection 5-40 mL  5-40 mL IntraVENous 2 times per day    sodium chloride flush 0.9 % injection 5-40 mL  5-40 mL IntraVENous PRN    0.9 % sodium chloride infusion   IntraVENous PRN    enoxaparin Sodium (LOVENOX) injection 30 mg  30 mg SubCUTAneous Daily    ondansetron (ZOFRAN-ODT) disintegrating tablet 4 mg  4 mg Oral Q8H PRN    Or    ondansetron (ZOFRAN) injection 4 mg  4 mg IntraVENous Q6H PRN    polyethylene 
2322  Code RACHEL called as the patient keeps on trying to get out on the bed, verbally abusive, physically aggressive and restless. , supervisor, RRT RN responded. Family practice Doctor came too to talk with the patient. RN noticed a  skin tear  on her L leg after the event,  dressing done. PRN meds for anxiety given and sitter order was in for safety purposes.   
5/20/24 at 1517 paper gone over with the patient iv removed and a volunteer will be called to take her to the lobby when ready  
Before leaving to go to new room, pt takes off telemetry, gown, and takes one IV out of her arm, leaving her with one working IV.  Pt is still Alert and oriented times 3.  Just appears to be slightly anxious.    
Pharmacy Dosing Services: Zosyn    Zosyn 2.25 gm IV automatically adjusted per protocol to Zosyn 3.375 gm IV Q8H (extended-infusion over 4 hours) for CrCl >/= 20 mL/min  Can continue Q8H dosing since we utilize extended-infusion (instead of changing to 2.25 gm IV Q6H for regular infusion)    Thank you,  Jamar Penny, Pharm D, BCPS  [phone]    
Report given to 5th floor RN at this time.  
VAT- Acknowledged order for PICC placement for long term antibiotics. Will continue to follow chart for discharge planning. Will place PICC closer to discharge. Please call with any questions or concerns. #[id number]  
(CULTURELLE) capsule 1 capsule  1 capsule Oral Daily with breakfast    ALPRAZolam (XANAX) tablet 0.5 mg  0.5 mg Oral Nightly PRN    amLODIPine (NORVASC) tablet 5 mg  5 mg Oral Daily    lisinopril (PRINIVIL;ZESTRIL) tablet 20 mg  20 mg Oral Daily    pantoprazole (PROTONIX) tablet 40 mg  40 mg Oral QAM AC    sertraline (ZOLOFT) tablet 100 mg  100 mg Oral Daily    sodium chloride flush 0.9 % injection 5-40 mL  5-40 mL IntraVENous 2 times per day    sodium chloride flush 0.9 % injection 5-40 mL  5-40 mL IntraVENous PRN    0.9 % sodium chloride infusion   IntraVENous PRN    ondansetron (ZOFRAN-ODT) disintegrating tablet 4 mg  4 mg Oral Q8H PRN    Or    ondansetron (ZOFRAN) injection 4 mg  4 mg IntraVENous Q6H PRN    polyethylene glycol (GLYCOLAX) packet 17 g  17 g Oral Daily PRN    acetaminophen (TYLENOL) tablet 650 mg  650 mg Oral Q6H PRN    Or    acetaminophen (TYLENOL) suppository 650 mg  650 mg Rectal Q6H PRN    melatonin tablet 3 mg  3 mg Oral Nightly PRN    hydrOXYzine HCl (ATARAX) tablet 25 mg  25 mg Oral Nightly PRN     Allergies  Allergies   Allergen Reactions    Cephalexin     Metaxalone     Metronidazole Nausea Only    Nitrofurantoin Nausea Only    Sulfa Antibiotics Rash     CBC:  Recent Labs     05/17/24  0727 05/18/24  0202 05/19/24  0603   WBC 5.7 7.6 6.8   HGB 14.0 11.9 12.2   * 117* 120*       Metabolic Panel:  Recent Labs     05/17/24  0727 05/18/24  0202 05/19/24  0603    139 141   K 3.6 3.5 3.3*    111* 113*   CO2 23 24 24   BUN 17 14 30*   MG  --  1.9 2.2   ALT 19 17 15       Imaging/procedures:     Xray Result (most recent):  XR CHEST PORTABLE 09/05/2022    Narrative  EXAM: XR CHEST PORT    INDICATION: Eval for Infiltrate    COMPARISON: None.    FINDINGS: A portable AP radiograph of the chest was obtained at 1815 hours.  There is no pneumothorax or pleural effusion. The lungs are clear. Cardiac size  is normal. Mild thoracic aortic tortuosity with otherwise normal mediastinal 
presents with    Fatigue    Urinary Frequency

## 2024-05-20 NOTE — CONSULTS
- 2.2     CBC with Auto Differential    Collection Time: 05/20/24  1:48 AM   Result Value Ref Range    WBC 5.8 3.6 - 11.0 K/uL    RBC 3.71 (L) 3.80 - 5.20 M/uL    Hemoglobin 11.3 (L) 11.5 - 16.0 g/dL    Hematocrit 32.8 (L) 35.0 - 47.0 %    MCV 88.4 80.0 - 99.0 FL    MCH 30.5 26.0 - 34.0 PG    MCHC 34.5 30.0 - 36.5 g/dL    RDW 12.8 11.5 - 14.5 %    Platelets 107 (L) 150 - 400 K/uL    MPV 10.6 8.9 - 12.9 FL    Nucleated RBCs 0.0 0  WBC    nRBC 0.00 0.00 - 0.01 K/uL    Neutrophils % 66 32 - 75 %    Lymphocytes % 24 12 - 49 %    Monocytes % 7 5 - 13 %    Eosinophils % 2 0 - 7 %    Basophils % 0 0 - 1 %    Immature Granulocytes % 1 (H) 0.0 - 0.5 %    Neutrophils Absolute 3.8 1.8 - 8.0 K/UL    Lymphocytes Absolute 1.4 0.8 - 3.5 K/UL    Monocytes Absolute 0.4 0.0 - 1.0 K/UL    Eosinophils Absolute 0.1 0.0 - 0.4 K/UL    Basophils Absolute 0.0 0.0 - 0.1 K/UL    Immature Granulocytes Absolute 0.0 0.00 - 0.04 K/UL    Differential Type AUTOMATED     Magnesium    Collection Time: 05/20/24  1:48 AM   Result Value Ref Range    Magnesium 2.4 1.6 - 2.4 mg/dL        Microbiology:      Studies:      Signed By: Silverio Jose,      May 20, 2024

## 2024-05-20 NOTE — DISCHARGE INSTRUCTIONS
HOME DISCHARGE INSTRUCTIONS    Patricia Huston / [id number] : 1945    Admission date: 2024 Discharge date: 2024 12:09 PM     Please bring this form with you to show your care provider at your follow-up appointment.    Primary care provider:  Marcos Edwards      Discharging provider:  Tyron Ardon DO  - Family Medicine Resident  Jignesh Holt MD  - Family Medicine Attending      You have been admitted to the hospital with the following diagnoses:    ACUTE DIAGNOSES:  Generalized weakness [R53.1]  Multiple drug resistant organism (MDRO) culture positive [Z16.24]  Urinary tract infection without hematuria, site unspecified [N39.0]    You were admitted for ESBL UTI. You improved with IV antibiotics called Zosyn and fosfomycin. Your repeat urine culture is normal.  . . . . . . . . . . . . . . . . . . . . . . . . . . . . . . . . . . . . . . . . . . . . . . . . . . . . . . . . . . . . . . . . . . . . . .      MEDICATION CHANGES  -none  . . . . . . . . . . . . . . . . . . . . . . . . . . . . . . . . . . . . . . . . . . . . . . . . . . . . . . . . . . . . . . . . . . . . . .     FOLLOW-UP CARE RECOMMENDATIONS:    Appointments  Future Appointments   Date Time Provider Department Center   2024  1:00 PM [facility]   10/8/2024  9:00 AM [facility] 1 [facility]   10/8/2024 10:00 AM Francisco Echavarria DO [facility]       Marcos Edwards MD  [address] [address] [address] [phone]    Call  For hospital discharge follow up. We reached out to them so the office should give you a call to make an appointment. If you do not hear back from them, give them a call.        Follow-up with your PCP regarding:  -Slowly weaning your Xanax. This has a large side effect profile including sleep problems, development of dementia, etc. We would suggest slowly weaning this medication every month with your primary care physician.  -Discuss risks and benefits of continuing your

## 2024-05-23 ENCOUNTER — OFFICE VISIT (OUTPATIENT)
Facility: CLINIC | Age: 79
End: 2024-05-23

## 2024-05-23 VITALS
HEIGHT: 63 IN | HEART RATE: 76 BPM | BODY MASS INDEX: 21.26 KG/M2 | RESPIRATION RATE: 20 BRPM | DIASTOLIC BLOOD PRESSURE: 75 MMHG | TEMPERATURE: 97.6 F | OXYGEN SATURATION: 97 % | WEIGHT: 120 LBS | SYSTOLIC BLOOD PRESSURE: 127 MMHG

## 2024-05-23 DIAGNOSIS — F41.9 ANXIETY: ICD-10-CM

## 2024-05-23 DIAGNOSIS — N39.0 FREQUENT UTI: ICD-10-CM

## 2024-05-23 DIAGNOSIS — I10 PRIMARY HYPERTENSION: ICD-10-CM

## 2024-05-23 DIAGNOSIS — K58.2 IRRITABLE BOWEL SYNDROME WITH BOTH CONSTIPATION AND DIARRHEA: ICD-10-CM

## 2024-05-23 DIAGNOSIS — Z16.12 INFECTION DUE TO ESBL-PRODUCING ESCHERICHIA COLI: ICD-10-CM

## 2024-05-23 DIAGNOSIS — K76.0 NONALCOHOLIC FATTY LIVER DISEASE WITHOUT NONALCOHOLIC STEATOHEPATITIS (NASH): ICD-10-CM

## 2024-05-23 DIAGNOSIS — A49.8 INFECTION DUE TO ESBL-PRODUCING ESCHERICHIA COLI: ICD-10-CM

## 2024-05-23 DIAGNOSIS — Z09 HOSPITAL DISCHARGE FOLLOW-UP: Primary | ICD-10-CM

## 2024-05-23 DIAGNOSIS — K57.30 COLON, DIVERTICULOSIS: ICD-10-CM

## 2024-05-23 DIAGNOSIS — Z90.49 S/P PARTIAL COLECTOMY: ICD-10-CM

## 2024-05-23 DIAGNOSIS — D69.6 THROMBOCYTOPENIA (HCC): ICD-10-CM

## 2024-05-23 PROBLEM — E83.119 HEMOCHROMATOSIS: Status: RESOLVED | Noted: 2024-05-23 | Resolved: 2024-05-23

## 2024-05-23 PROBLEM — S22.000A WEDGE FRACTURE OF THORACIC VERTEBRA (HCC): Status: RESOLVED | Noted: 2018-04-20 | Resolved: 2024-05-23

## 2024-05-23 PROBLEM — E78.00 PURE HYPERCHOLESTEROLEMIA: Status: RESOLVED | Noted: 2024-05-23 | Resolved: 2024-05-23

## 2024-05-23 PROBLEM — K31.84 GASTROPARESIS: Status: RESOLVED | Noted: 2024-05-23 | Resolved: 2024-05-23

## 2024-05-23 PROBLEM — E55.9 VITAMIN D DEFICIENCY: Status: RESOLVED | Noted: 2024-05-23 | Resolved: 2024-05-23

## 2024-05-23 PROBLEM — S52.509A FRACTURE OF DISTAL END OF RADIUS: Status: RESOLVED | Noted: 2019-05-02 | Resolved: 2024-05-23

## 2024-05-23 PROBLEM — K21.9 GERD (GASTROESOPHAGEAL REFLUX DISEASE): Status: RESOLVED | Noted: 2019-05-03 | Resolved: 2024-05-23

## 2024-05-23 PROBLEM — M85.80 OSTEOPENIA: Status: RESOLVED | Noted: 2024-05-23 | Resolved: 2024-05-23

## 2024-05-23 LAB
BACTERIA SPEC CULT: NORMAL
BACTERIA SPEC CULT: NORMAL
SERVICE CMNT-IMP: NORMAL
SERVICE CMNT-IMP: NORMAL

## 2024-05-23 RX ORDER — AMLODIPINE BESYLATE 5 MG/1
5 TABLET ORAL DAILY
Qty: 90 TABLET | Refills: 3 | Status: SHIPPED | OUTPATIENT
Start: 2024-05-23

## 2024-05-23 RX ORDER — ALPRAZOLAM 0.5 MG/1
0.5 TABLET ORAL NIGHTLY PRN
Qty: 90 TABLET | Refills: 0 | Status: SHIPPED | OUTPATIENT
Start: 2024-05-23 | End: 2024-08-21

## 2024-05-23 NOTE — ASSESSMENT & PLAN NOTE
Monitored by specialist- no acute findings meriting change in the plan. Agree with getting diarrhea/constipation under control by gradually increasing dietary fiber, taking probiotics to guild good gut paul and outcompete e coli.

## 2024-05-23 NOTE — PROGRESS NOTES
Chief Complaint   Patient presents with    Follow-Up from Hospital     Hosp fu from uti feeling ok not 100% for sure.  Has been on abx for a while.  Pt feels weak.  Legs have been hurting for last two years.  Pt just feels not right.

## 2024-05-23 NOTE — PATIENT INSTRUCTIONS
Continue the citrucel fiber supplements, goal fiber is 25-30g a day. Drink plenty of water.    Probiotics.

## 2024-05-23 NOTE — ASSESSMENT & PLAN NOTE
Might actually be overflow diarrhea at times, but either way, needs more fiber, and agree probiotics.

## 2024-05-23 NOTE — PROGRESS NOTES
Post-Discharge Transitional Care  Follow Up      Patricia Huston   YOB: 1945    Date of Office Visit:  5/23/2024  Date of Hospital Admission: 5/17/24  Date of Hospital Discharge: 5/20/24  Risk of hospital readmission (high >=14%. Medium >=10%) :Readmission Risk Score: 11.8      Care management risk score Rising risk (score 2-5) and Complex Care (Scores >=6): No Risk Score On File     Non face to face  following discharge, date last encounter closed (first attempt may have been earlier): 05/21/2024    Call initiated 2 business days of discharge: Yes    ASSESSMENT/PLAN:   Hospital discharge follow-up  -     SD DISCHARGE MEDS RECONCILED W/ CURRENT OUTPATIENT MED LIST  Infection due to ESBL-producing Escherichia coli  Frequent UTI  Assessment & Plan:   Monitored by specialist- no acute findings meriting change in the plan. Agree with getting diarrhea/constipation under control by gradually increasing dietary fiber, taking probiotics to guild good gut paul and outcompete e coli.  Irritable bowel syndrome with both constipation and diarrhea  Assessment & Plan:  Might actually be overflow diarrhea at times, but either way, needs more fiber, and agree probiotics.   Colon, diverticulosis  S/P partial colectomy  Anxiety  -     ALPRAZolam (XANAX) 0.5 MG tablet; Take 1 tablet by mouth nightly as needed for Sleep or Anxiety for up to 90 days. Max Daily Amount: 0.5 mg, Disp-90 tablet, R-0Normal  Primary hypertension  -     amLODIPine (NORVASC) 5 MG tablet; Take 1 tablet by mouth daily, Disp-90 tablet, R-3Normal  Thrombocytopenia (HCC)  Nonalcoholic fatty liver disease without nonalcoholic steatohepatitis (CONTEH)  Assessment & Plan:  Continue monitoring liver enzymes, if elevates repeat liver imaging ultrasound. Consider elastography/fibroscan.     Resistant to statins though has never tried them. Also 79 years old never had issues. Will discuss more with cardiologist.      Medical Decision Making: moderate

## 2024-05-30 ENCOUNTER — TELEPHONE (OUTPATIENT)
Facility: CLINIC | Age: 79
End: 2024-05-30

## 2024-05-30 DIAGNOSIS — N18.31 STAGE 3A CHRONIC KIDNEY DISEASE (HCC): ICD-10-CM

## 2024-05-30 DIAGNOSIS — E78.2 MIXED HYPERLIPIDEMIA: Primary | ICD-10-CM

## 2024-05-30 DIAGNOSIS — E78.2 MIXED HYPERLIPIDEMIA: ICD-10-CM

## 2024-05-30 DIAGNOSIS — I10 PRIMARY HYPERTENSION: ICD-10-CM

## 2024-05-30 NOTE — TELEPHONE ENCOUNTER
Unable to reach pt, but left message per request advising Dr. Edwards reprinted lab orders to have faxed to [facility] at [facility].  Orders have been faxed and pt will contact [facility] to schedule her appointment. Kike

## 2024-05-30 NOTE — TELEPHONE ENCOUNTER
Pt called requesting lab orders printed by Dr. Edwards on 1/30/24 with 6 month expected date be released for her and her spouse (see previous telephone encounter) to have done prior to their follow up appointment on 7/18/24.    Pt is hoping to schedule at [facility] [place] location at the end of June and requests call back to advise at [phone]. Kike

## 2024-06-13 ENCOUNTER — TELEPHONE (OUTPATIENT)
Facility: CLINIC | Age: 79
End: 2024-06-13

## 2024-06-14 DIAGNOSIS — K57.30 COLON, DIVERTICULOSIS: ICD-10-CM

## 2024-06-14 DIAGNOSIS — Z90.49 S/P PARTIAL COLECTOMY: ICD-10-CM

## 2024-06-14 DIAGNOSIS — K58.2 IRRITABLE BOWEL SYNDROME WITH BOTH CONSTIPATION AND DIARRHEA: Primary | ICD-10-CM

## 2024-07-03 RX ORDER — OMEPRAZOLE 20 MG/1
20 CAPSULE, DELAYED RELEASE ORAL DAILY
Qty: 90 CAPSULE | Refills: 1 | Status: SHIPPED | OUTPATIENT
Start: 2024-07-03

## 2024-07-16 ENCOUNTER — OFFICE VISIT (OUTPATIENT)
Facility: CLINIC | Age: 79
End: 2024-07-16
Payer: MEDICARE

## 2024-07-16 VITALS
SYSTOLIC BLOOD PRESSURE: 115 MMHG | TEMPERATURE: 97.4 F | WEIGHT: 122 LBS | HEIGHT: 63 IN | HEART RATE: 84 BPM | RESPIRATION RATE: 20 BRPM | BODY MASS INDEX: 21.62 KG/M2 | DIASTOLIC BLOOD PRESSURE: 65 MMHG | OXYGEN SATURATION: 98 %

## 2024-07-16 DIAGNOSIS — Z00.00 MEDICARE ANNUAL WELLNESS VISIT, SUBSEQUENT: Primary | ICD-10-CM

## 2024-07-16 DIAGNOSIS — K58.2 IRRITABLE BOWEL SYNDROME WITH BOTH CONSTIPATION AND DIARRHEA: ICD-10-CM

## 2024-07-16 DIAGNOSIS — M62.89 PELVIC FLOOR DYSFUNCTION IN FEMALE: ICD-10-CM

## 2024-07-16 DIAGNOSIS — N39.0 FREQUENT UTI: ICD-10-CM

## 2024-07-16 PROCEDURE — 3074F SYST BP LT 130 MM HG: CPT | Performed by: FAMILY MEDICINE

## 2024-07-16 PROCEDURE — 99214 OFFICE O/P EST MOD 30 MIN: CPT | Performed by: FAMILY MEDICINE

## 2024-07-16 PROCEDURE — 3078F DIAST BP <80 MM HG: CPT | Performed by: FAMILY MEDICINE

## 2024-07-16 PROCEDURE — G0439 PPPS, SUBSEQ VISIT: HCPCS | Performed by: FAMILY MEDICINE

## 2024-07-16 PROCEDURE — 1123F ACP DISCUSS/DSCN MKR DOCD: CPT | Performed by: FAMILY MEDICINE

## 2024-07-16 ASSESSMENT — PATIENT HEALTH QUESTIONNAIRE - PHQ9
6. FEELING BAD ABOUT YOURSELF - OR THAT YOU ARE A FAILURE OR HAVE LET YOURSELF OR YOUR FAMILY DOWN: NOT AT ALL
SUM OF ALL RESPONSES TO PHQ QUESTIONS 1-9: 1
9. THOUGHTS THAT YOU WOULD BE BETTER OFF DEAD, OR OF HURTING YOURSELF: NOT AT ALL
SUM OF ALL RESPONSES TO PHQ QUESTIONS 1-9: 1
1. LITTLE INTEREST OR PLEASURE IN DOING THINGS: NOT AT ALL
10. IF YOU CHECKED OFF ANY PROBLEMS, HOW DIFFICULT HAVE THESE PROBLEMS MADE IT FOR YOU TO DO YOUR WORK, TAKE CARE OF THINGS AT HOME, OR GET ALONG WITH OTHER PEOPLE: NOT DIFFICULT AT ALL
SUM OF ALL RESPONSES TO PHQ QUESTIONS 1-9: 1
2. FEELING DOWN, DEPRESSED OR HOPELESS: NOT AT ALL
3. TROUBLE FALLING OR STAYING ASLEEP: NOT AT ALL
7. TROUBLE CONCENTRATING ON THINGS, SUCH AS READING THE NEWSPAPER OR WATCHING TELEVISION: NOT AT ALL
8. MOVING OR SPEAKING SO SLOWLY THAT OTHER PEOPLE COULD HAVE NOTICED. OR THE OPPOSITE, BEING SO FIGETY OR RESTLESS THAT YOU HAVE BEEN MOVING AROUND A LOT MORE THAN USUAL: NOT AT ALL
SUM OF ALL RESPONSES TO PHQ9 QUESTIONS 1 & 2: 0
SUM OF ALL RESPONSES TO PHQ QUESTIONS 1-9: 1
4. FEELING TIRED OR HAVING LITTLE ENERGY: SEVERAL DAYS
5. POOR APPETITE OR OVEREATING: NOT AT ALL

## 2024-07-16 ASSESSMENT — LIFESTYLE VARIABLES
HOW OFTEN DO YOU HAVE A DRINK CONTAINING ALCOHOL: MONTHLY OR LESS
HOW MANY STANDARD DRINKS CONTAINING ALCOHOL DO YOU HAVE ON A TYPICAL DAY: 1 OR 2

## 2024-07-16 NOTE — ASSESSMENT & PLAN NOTE
Borderline controlled. Outlet troubles not related to structural problem, but likely pelvic floor dysfunction. Somewhat related to chronic problems, UTI, prev vaginal birth, age, and possibly spine issues chronic; Would recommend eval and treatment with pelvic floor physical therapy.

## 2024-07-16 NOTE — PROGRESS NOTES
Chief Complaint   Patient presents with    Medicare AWV     Awv pt went this morning to get her labs drawn.  Gen health is pretty good bladder infections have seemed to be under controlled now.  Drinks 50oz water a day with 4 cranberry tablets.  Seen GI doctor recently still having issues with IBS since she was born. Currently having bouts of constipation even with Citrucel and britney lax and increased fiber in diet.         
a week Yes Provider, MD [place]   sertraline (ZOLOFT) 100 MG tablet TAKE ONE TABLET BY MOUTH ONE TIME DAILY Yes Marcos Edwards MD   lisinopril (PRINIVIL;ZESTRIL) 20 MG tablet Take by mouth daily Yes Automatic Reconciliation, Ar   ondansetron (ZOFRAN-ODT) 4 MG disintegrating tablet ceived the following from [facility]: Outside name: ondansetron (ZOFRAN ODT) 4 mg disintegrating tablet Yes Automatic Reconciliation, Ar   metoclopramide (REGLAN) 5 MG tablet Take 1 tablet by mouth 3 times daily (before meals)  Automatic Reconciliation, Ar       CareTeam (Including outside providers/suppliers regularly involved in providing care):   Patient Care Team:  Marcos Edwards MD as PCP - General (Family Medicine)  Marcos Edwards MD as PCP - Empaneled Provider  Stephy Bhat as Ambulatory Care Manager      Reviewed and updated this visit:  Allergies  Meds             The patient (or guardian, if applicable) and other individuals in attendance with the patient were advised that Artificial Intelligence will be utilized during this visit to record and process the conversation to generate a clinical note. The patient (or guardian, if applicable) and other individuals in attendance at the appointment consented to the use of AI, including the recording.

## 2024-07-17 LAB
ALBUMIN SERPL-MCNC: 4.7 G/DL (ref 3.8–4.8)
ALP SERPL-CCNC: 75 IU/L (ref 44–121)
ALT SERPL-CCNC: 13 IU/L (ref 0–32)
AST SERPL-CCNC: 15 IU/L (ref 0–40)
BILIRUB SERPL-MCNC: 0.6 MG/DL (ref 0–1.2)
BUN SERPL-MCNC: 15 MG/DL (ref 8–27)
BUN/CREAT SERPL: 15 (ref 12–28)
CALCIUM SERPL-MCNC: 9.3 MG/DL (ref 8.7–10.3)
CHLORIDE SERPL-SCNC: 103 MMOL/L (ref 96–106)
CHOLEST SERPL-MCNC: 228 MG/DL (ref 100–199)
CO2 SERPL-SCNC: 23 MMOL/L (ref 20–29)
CREAT SERPL-MCNC: 1.01 MG/DL (ref 0.57–1)
EGFRCR SERPLBLD CKD-EPI 2021: 56 ML/MIN/1.73
ERYTHROCYTE [DISTWIDTH] IN BLOOD BY AUTOMATED COUNT: 12.7 % (ref 11.7–15.4)
GLOBULIN SER CALC-MCNC: 2.6 G/DL (ref 1.5–4.5)
GLUCOSE SERPL-MCNC: 95 MG/DL (ref 70–99)
HCT VFR BLD AUTO: 38.1 % (ref 34–46.6)
HDLC SERPL-MCNC: 43 MG/DL
HGB BLD-MCNC: 12.9 G/DL (ref 11.1–15.9)
IMP & REVIEW OF LAB RESULTS: NORMAL
LDLC SERPL CALC-MCNC: 156 MG/DL (ref 0–99)
MCH RBC QN AUTO: 30.6 PG (ref 26.6–33)
MCHC RBC AUTO-ENTMCNC: 33.9 G/DL (ref 31.5–35.7)
MCV RBC AUTO: 90 FL (ref 79–97)
PLATELET # BLD AUTO: 135 X10E3/UL (ref 150–450)
POTASSIUM SERPL-SCNC: 4.5 MMOL/L (ref 3.5–5.2)
PROT SERPL-MCNC: 7.3 G/DL (ref 6–8.5)
RBC # BLD AUTO: 4.22 X10E6/UL (ref 3.77–5.28)
REPORT: NORMAL
REPORT: NORMAL
SODIUM SERPL-SCNC: 142 MMOL/L (ref 134–144)
TRIGL SERPL-MCNC: 159 MG/DL (ref 0–149)
VLDLC SERPL CALC-MCNC: 29 MG/DL (ref 5–40)
WBC # BLD AUTO: 5.4 X10E3/UL (ref 3.4–10.8)

## 2024-08-24 ENCOUNTER — HOSPITAL ENCOUNTER (EMERGENCY)
Facility: HOSPITAL | Age: 79
Discharge: HOME | End: 2024-08-24
Attending: EMERGENCY MEDICINE
Payer: MEDICARE

## 2024-08-24 VITALS
RESPIRATION RATE: 18 BRPM | TEMPERATURE: 98 F | DIASTOLIC BLOOD PRESSURE: 71 MMHG | HEART RATE: 100 BPM | SYSTOLIC BLOOD PRESSURE: 168 MMHG | WEIGHT: 125 LBS | OXYGEN SATURATION: 98 % | HEIGHT: 63 IN | BODY MASS INDEX: 22.15 KG/M2

## 2024-08-24 DIAGNOSIS — N39.0 URINARY TRACT INFECTION WITH HEMATURIA, SITE UNSPECIFIED: Primary | ICD-10-CM

## 2024-08-24 DIAGNOSIS — R31.9 URINARY TRACT INFECTION WITH HEMATURIA, SITE UNSPECIFIED: Primary | ICD-10-CM

## 2024-08-24 LAB
APPEARANCE UR: CLEAR
BACTERIA URNS QL MICRO: ABNORMAL /HPF
BILIRUB UR QL: NEGATIVE
COLOR UR: ABNORMAL
EPITH CASTS URNS QL MICRO: ABNORMAL /LPF
GLUCOSE UR STRIP.AUTO-MCNC: NEGATIVE MG/DL
HGB UR QL STRIP: ABNORMAL
HYALINE CASTS URNS QL MICRO: ABNORMAL /LPF (ref 0–2)
KETONES UR QL STRIP.AUTO: NEGATIVE MG/DL
LEUKOCYTE ESTERASE UR QL STRIP.AUTO: ABNORMAL
NITRITE UR QL STRIP.AUTO: NEGATIVE
PH UR STRIP: 7 (ref 5–8)
PROT UR STRIP-MCNC: ABNORMAL MG/DL
RBC #/AREA URNS HPF: ABNORMAL /HPF (ref 0–5)
SP GR UR REFRACTOMETRY: 1.01 (ref 1–1.03)
URINE CULTURE IF INDICATED: ABNORMAL
UROBILINOGEN UR QL STRIP.AUTO: 0.2 EU/DL (ref 0.2–1)
WBC URNS QL MICRO: >100 /HPF (ref 0–4)

## 2024-08-24 PROCEDURE — 87186 SC STD MICRODIL/AGAR DIL: CPT

## 2024-08-24 PROCEDURE — 99283 EMERGENCY DEPT VISIT LOW MDM: CPT

## 2024-08-24 PROCEDURE — 87088 URINE BACTERIA CULTURE: CPT

## 2024-08-24 PROCEDURE — 81001 URINALYSIS AUTO W/SCOPE: CPT

## 2024-08-24 PROCEDURE — 87086 URINE CULTURE/COLONY COUNT: CPT

## 2024-08-24 RX ORDER — CIPROFLOXACIN 250 MG/1
250 TABLET, FILM COATED ORAL 2 TIMES DAILY
Qty: 10 TABLET | Refills: 0 | Status: SHIPPED | OUTPATIENT
Start: 2024-08-24 | End: 2024-08-28

## 2024-08-24 ASSESSMENT — ENCOUNTER SYMPTOMS
SINUS PRESSURE: 0
EYE PAIN: 0
COUGH: 0
SHORTNESS OF BREATH: 0
NAUSEA: 0
SINUS PAIN: 0
ABDOMINAL PAIN: 0
EYE REDNESS: 0
COLOR CHANGE: 0
ABDOMINAL DISTENTION: 0
VOMITING: 0

## 2024-08-24 ASSESSMENT — PAIN DESCRIPTION - DESCRIPTORS: DESCRIPTORS: SHARP

## 2024-08-24 ASSESSMENT — PAIN SCALES - GENERAL: PAINLEVEL_OUTOF10: 8

## 2024-08-24 ASSESSMENT — PAIN DESCRIPTION - LOCATION: LOCATION: OTHER (COMMENT)

## 2024-08-24 ASSESSMENT — PAIN - FUNCTIONAL ASSESSMENT: PAIN_FUNCTIONAL_ASSESSMENT: 0-10

## 2024-08-24 NOTE — ED PROVIDER NOTES
[facility] EMERGENCY DEPT  EMERGENCY DEPARTMENT ENCOUNTER      Pt Name: Patricia Huston  MRN: [id number]  Birthdate 1945  Date of evaluation: 8/24/2024  Provider: BENJAMIN Beaulieu NP      HISTORY OF PRESENT ILLNESS      History of Present Illness: The patient presents with complaints of worsening urinary symptoms, including dysuria, pain, pressure, and chills. She reports a history of frequent urinary tract infections (UTIs) and has been on a treatment regimen since May. Despite this, she has experienced worsening symptoms over the past few days.    Past Medical History:  No date: Depression  05/02/2019: Fracture of distal end of radius  05/23/2024: Gastroparesis  05/03/2019: GERD (gastroesophageal reflux disease)  05/23/2024: Hemochromatosis  No date: Hypertension  No date: Kidney stone  05/23/2024: Osteopenia  05/23/2024: Pure hypercholesterolemia  No date: Sepsis (HCC)  05/23/2024: Vitamin D deficiency  04/20/2018: Wedge fracture of thoracic vertebra (HCC)  Past Surgical History:  No date: COLON SURGERY      The history is provided by the patient.           Nursing Notes were reviewed.    REVIEW OF SYSTEMS         Review of Systems   Constitutional:  Positive for chills. Negative for appetite change, diaphoresis and fatigue.   HENT:  Negative for congestion, sinus pressure and sinus pain.    Eyes:  Negative for pain and redness.   Respiratory:  Negative for cough and shortness of breath.    Cardiovascular:  Negative for chest pain and palpitations.   Gastrointestinal:  Negative for abdominal distention, abdominal pain, nausea and vomiting.   Genitourinary:  Positive for difficulty urinating, dysuria and frequency. Negative for hematuria and urgency.        \" Pressure.\"   Musculoskeletal:  Negative for arthralgias, neck pain and neck stiffness.   Skin:  Negative for color change, pallor, rash and wound.   Neurological:  Negative for dizziness, speech difficulty and headaches.   Hematological:  Does not     Overall Financial Resource Strain (CARDIA)     Difficulty of Paying Living Expenses: Somewhat hard   Food Insecurity: No Food Insecurity (5/17/2024)    Hunger Vital Sign     Worried About Running Out of Food in the Last Year: Never true     Ran Out of Food in the Last Year: Never true   Transportation Needs: No Transportation Needs (5/17/2024)    PRAPARE - Transportation     Lack of Transportation (Medical): No     Lack of Transportation (Non-Medical): No   Physical Activity: Insufficiently Active (7/16/2024)    Exercise Vital Sign     Days of Exercise per Week: 3 days     Minutes of Exercise per Session: 10 min   Stress: No Stress Concern Present (5/9/2024)    [employer] [place] [employer] - Occupational Stress Questionnaire     Feeling of Stress : Only a little   Social Connections: Moderately Isolated (5/9/2024)    Social Connection and Isolation Panel [NHANES]     Frequency of Communication with Friends and Family: More than three times a week     Frequency of Social Gatherings with Friends and Family: More than three times a week     Attends Mandaen Services: Never     Active Member of Clubs or Organizations: No     Attends Club or Organization Meetings: Never     Marital Status:    Intimate Partner Violence: Not At Risk (5/9/2024)    Humiliation, Afraid, Rape, and Kick questionnaire     Fear of Current or Ex-Partner: No     Emotionally Abused: No     Physically Abused: No     Sexually Abused: No   Housing Stability: Low Risk  (5/17/2024)    Housing Stability Vital Sign     Unable to Pay for Housing in the Last Year: No     Number of Places Lived in the Last Year: 1     Unstable Housing in the Last Year: No         PHYSICAL EXAM       ED Triage Vitals [08/24/24 0836]   BP Systolic BP Percentile Diastolic BP Percentile Temp Temp Source Pulse Respirations SpO2   (!) 168/71 -- -- 98 °F (36.7 °C) Oral 100 18 98 %      Height Weight - Scale         1.6 m (5' 3\") 56.7 kg (125 lb)        with pt. All of pt's questions and concerns were addressed. Pt agrees to F/U as instructed and agrees to return to ED upon further deterioration. Pt is ready to go home.  BENJAMIN Beaulieu NP      (Please note that portions of this note were completed with a voice recognition program.  Efforts were made to edit the dictations but occasionally words are mis-transcribed.)              Rosa Maria Pierce APRN - NP  08/24/24 0853

## 2024-08-24 NOTE — ED TRIAGE NOTES
Patient arrives with the c.c. of bladder infection, pt reports \"has them all the time\" and has a \"regiment\" since may but pt reports last couple days has been having pressure, pain, burning; pt reports chills as well.

## 2024-08-25 LAB
BACTERIA SPEC CULT: ABNORMAL
CC UR VC: ABNORMAL
SERVICE CMNT-IMP: ABNORMAL

## 2024-08-26 LAB
BACTERIA SPEC CULT: ABNORMAL
CC UR VC: ABNORMAL
SERVICE CMNT-IMP: ABNORMAL

## 2024-08-27 ENCOUNTER — TELEPHONE (OUTPATIENT)
Facility: CLINIC | Age: 79
End: 2024-08-27

## 2024-08-27 NOTE — TELEPHONE ENCOUNTER
Pt  came in requesting an appointment and a call back. Pt went to ed for UTI and was given Cipro, she is concerned that she will get sepsis again. She had labs done in the ED and would like a call to go over her labs and discuss the medication they gave her. She has an appointment tomorrow but wants a call today.      Patricia [phone]

## 2024-08-28 ENCOUNTER — OFFICE VISIT (OUTPATIENT)
Facility: CLINIC | Age: 79
End: 2024-08-28
Payer: MEDICARE

## 2024-08-28 VITALS
TEMPERATURE: 98.4 F | SYSTOLIC BLOOD PRESSURE: 122 MMHG | OXYGEN SATURATION: 98 % | HEART RATE: 79 BPM | WEIGHT: 125 LBS | DIASTOLIC BLOOD PRESSURE: 74 MMHG | BODY MASS INDEX: 22.15 KG/M2 | RESPIRATION RATE: 20 BRPM | HEIGHT: 63 IN

## 2024-08-28 DIAGNOSIS — N39.0 FREQUENT UTI: ICD-10-CM

## 2024-08-28 DIAGNOSIS — N30.01 ACUTE CYSTITIS WITH HEMATURIA: Primary | ICD-10-CM

## 2024-08-28 DIAGNOSIS — Z78.0 POST-MENOPAUSAL: ICD-10-CM

## 2024-08-28 DIAGNOSIS — M85.80 OSTEOPENIA, UNSPECIFIED LOCATION: ICD-10-CM

## 2024-08-28 LAB
BILIRUBIN, URINE, POC: NEGATIVE
BLOOD URINE, POC: ABNORMAL
GLUCOSE URINE, POC: NEGATIVE
KETONES, URINE, POC: NEGATIVE
LEUKOCYTE ESTERASE, URINE, POC: ABNORMAL
NITRITE, URINE, POC: NEGATIVE
PH, URINE, POC: 6.5 (ref 4.6–8)
PROTEIN,URINE, POC: NEGATIVE
SPECIFIC GRAVITY, URINE, POC: 1.03 (ref 1–1.03)
URINALYSIS CLARITY, POC: CLEAR
URINALYSIS COLOR, POC: YELLOW
UROBILINOGEN, POC: NORMAL

## 2024-08-28 PROCEDURE — 1123F ACP DISCUSS/DSCN MKR DOCD: CPT | Performed by: FAMILY MEDICINE

## 2024-08-28 PROCEDURE — 3078F DIAST BP <80 MM HG: CPT | Performed by: FAMILY MEDICINE

## 2024-08-28 PROCEDURE — 81003 URINALYSIS AUTO W/O SCOPE: CPT | Performed by: FAMILY MEDICINE

## 2024-08-28 PROCEDURE — 99214 OFFICE O/P EST MOD 30 MIN: CPT | Performed by: FAMILY MEDICINE

## 2024-08-28 PROCEDURE — 3074F SYST BP LT 130 MM HG: CPT | Performed by: FAMILY MEDICINE

## 2024-08-28 RX ORDER — CIPROFLOXACIN 500 MG/1
500 TABLET, FILM COATED ORAL 2 TIMES DAILY
Qty: 10 TABLET | Refills: 0 | Status: SHIPPED | OUTPATIENT
Start: 2024-08-28 | End: 2024-09-02

## 2024-08-28 NOTE — PROGRESS NOTES
SUBJECTIVES  with respective ASSESSMENT/PLAN:  Ms. Patricia Huston is a 79 y.o. female established patient who presents for Urinary Tract Infection (Pt was seen for uti and placed on cipro for five days which she feel her body is resistant to abx.  Has noticed pelvic pain and dysuria. Difficulty urinating as well.  Feels exhausted by the end of the day. Pt has hx of sepsis and very weary./Culture done  and resulted. )  , found to have the followin. Acute cystitis with hematuria  The following orders have not been finalized:  -     ciprofloxacin (CIPRO) 500 MG tablet  2. Post-menopausal  3. Frequent UTI  Overview:  Background: for many years, an episode of severe SEPSIS from urologic source bacteremia -2022.  Prev Precipitating factors: diarrhea, constipation  Specialist: Dr. Guanakito Rees at [facility]    Medication: Estradiol 0.1mg/gm vaginal cream    Interval Hx:  - hadn't been having any more UTIs since working on GI troubles, estrogen cream.  - drinking 50oz water a day, fiber, estrogen.  - most recently had a few days of suprapubic discomfort, dysuria, and ER took culture, treated with cirpofloxacin 250mg BID x 5 days.   - patient is feeling a bit better, is on day 4 of treatment, but symptoms still rather present. Culture showed STAPHYLOCOCCUS LUGDUNENSIS . Susceptible to cipro.  Assessment & Plan:   Uncontrolled, but not worsening. Likely needed higher dose cipro since is classified as complicated UTI due to age and history and bacteria involved. Will increase dosage to 500mg BID for 5 days from today. Complete course. Return if not improving after 3 days.        It was a pleasure seeing Ms. Patricia Huston today.    No follow-ups on file.      The following portions of the patient's history were reviewed and updated as appropriate: allergies, current medications, family history, medical history, social history, surgical history and problem list.    OBJECTIVE FINDINGS:    /74

## 2024-08-28 NOTE — PROGRESS NOTES
Chief Complaint   Patient presents with    Urinary Tract Infection     Pt was seen for uti and placed on cipro for five days which she feel her body is resistant to abx.  Has noticed pelvic pain and dysuria. Difficulty urinating as well.  Feels exhausted by the end of the day. Pt has hx of sepsis and very weary.  Culture done 8/24 and resulted.

## 2024-08-28 NOTE — ASSESSMENT & PLAN NOTE
Improving. Likely needed higher dose cipro since is classified as complicated UTI due to age and history and bacteria involved. Will increase dosage to 500mg BID for 5 days from today. Complete course. Return if not improving after 3 days.

## 2024-09-05 DIAGNOSIS — F41.9 ANXIETY: ICD-10-CM

## 2024-09-06 RX ORDER — ALPRAZOLAM 0.5 MG
0.5 TABLET ORAL NIGHTLY PRN
Qty: 90 TABLET | Refills: 0 | Status: SHIPPED | OUTPATIENT
Start: 2024-09-06 | End: 2024-12-05

## 2024-10-15 ENCOUNTER — HOSPITAL ENCOUNTER (EMERGENCY)
Facility: HOSPITAL | Age: 79
Discharge: HOME | End: 2024-10-15
Attending: EMERGENCY MEDICINE
Payer: MEDICARE

## 2024-10-15 VITALS
TEMPERATURE: 97.5 F | WEIGHT: 124.56 LBS | RESPIRATION RATE: 16 BRPM | HEART RATE: 89 BPM | BODY MASS INDEX: 22.07 KG/M2 | SYSTOLIC BLOOD PRESSURE: 144 MMHG | OXYGEN SATURATION: 96 % | DIASTOLIC BLOOD PRESSURE: 77 MMHG | HEIGHT: 63 IN

## 2024-10-15 DIAGNOSIS — N39.0 URINARY TRACT INFECTION WITHOUT HEMATURIA, SITE UNSPECIFIED: Primary | ICD-10-CM

## 2024-10-15 LAB
APPEARANCE UR: ABNORMAL
BACTERIA URNS QL MICRO: NEGATIVE /HPF
BILIRUB UR QL: NEGATIVE
COLOR UR: ABNORMAL
EPITH CASTS URNS QL MICRO: ABNORMAL /LPF
GLUCOSE UR STRIP.AUTO-MCNC: NEGATIVE MG/DL
HGB UR QL STRIP: ABNORMAL
KETONES UR QL STRIP.AUTO: NEGATIVE MG/DL
LEUKOCYTE ESTERASE UR QL STRIP.AUTO: ABNORMAL
NITRITE UR QL STRIP.AUTO: NEGATIVE
PH UR STRIP: 5.5 (ref 5–8)
PROT UR STRIP-MCNC: 30 MG/DL
RBC #/AREA URNS HPF: ABNORMAL /HPF (ref 0–5)
SP GR UR REFRACTOMETRY: 1.01 (ref 1–1.03)
UROBILINOGEN UR QL STRIP.AUTO: 0.2 EU/DL (ref 0.2–1)
WBC URNS QL MICRO: >100 /HPF (ref 0–4)

## 2024-10-15 PROCEDURE — 87088 URINE BACTERIA CULTURE: CPT

## 2024-10-15 PROCEDURE — 87186 SC STD MICRODIL/AGAR DIL: CPT

## 2024-10-15 PROCEDURE — 81001 URINALYSIS AUTO W/SCOPE: CPT

## 2024-10-15 PROCEDURE — 99283 EMERGENCY DEPT VISIT LOW MDM: CPT

## 2024-10-15 PROCEDURE — 87086 URINE CULTURE/COLONY COUNT: CPT

## 2024-10-15 RX ORDER — CIPROFLOXACIN 500 MG/1
500 TABLET, FILM COATED ORAL 2 TIMES DAILY
Qty: 14 TABLET | Refills: 0 | Status: SHIPPED | OUTPATIENT
Start: 2024-10-15 | End: 2024-10-22

## 2024-10-15 ASSESSMENT — ENCOUNTER SYMPTOMS
ABDOMINAL DISTENTION: 0
ABDOMINAL PAIN: 0
DIARRHEA: 1
VOMITING: 0
COLOR CHANGE: 0
EYE REDNESS: 0
SINUS PAIN: 0
SINUS PRESSURE: 0
EYE PAIN: 0
COUGH: 0
SHORTNESS OF BREATH: 0
NAUSEA: 0

## 2024-10-15 ASSESSMENT — PAIN SCALES - GENERAL: PAINLEVEL_OUTOF10: 0

## 2024-10-15 ASSESSMENT — PAIN - FUNCTIONAL ASSESSMENT: PAIN_FUNCTIONAL_ASSESSMENT: 0-10

## 2024-10-15 NOTE — ED PROVIDER NOTES
Gastrointestinal:  Positive for diarrhea. Negative for abdominal distention, abdominal pain, nausea and vomiting.   Genitourinary:  Positive for difficulty urinating, dysuria, frequency and urgency.   Musculoskeletal:  Negative for arthralgias, neck pain and neck stiffness.   Skin:  Negative for color change, pallor, rash and wound.   Neurological:  Negative for speech difficulty and headaches.   Hematological:  Does not bruise/bleed easily.   Psychiatric/Behavioral:  The patient is not nervous/anxious.            PAST MEDICAL HISTORY     Past Medical History:   Diagnosis Date    Depression     Fracture of distal end of radius 05/02/2019    Gastroparesis 05/23/2024    GERD (gastroesophageal reflux disease) 05/03/2019    Hemochromatosis 05/23/2024    Hypertension     Kidney stone     Osteopenia 05/23/2024    Pure hypercholesterolemia 05/23/2024    Sepsis (HCC)     Vitamin D deficiency 05/23/2024    Wedge fracture of thoracic vertebra (HCC) 04/20/2018         SURGICAL HISTORY       Past Surgical History:   Procedure Laterality Date    COLON SURGERY           CURRENT MEDICATIONS       Previous Medications    ALPRAZOLAM (XANAX) 0.5 MG TABLET    Take 1 tablet by mouth nightly as needed for Sleep for up to 90 days. Max Daily Amount: 0.5 mg    AMLODIPINE (NORVASC) 5 MG TABLET    Take 1 tablet by mouth daily    ESTRADIOL (ESTRACE) 0.1 MG/GM VAGINAL CREAM    Place 2 g vaginally three times a week    LACTOBACILLUS (CULTURELLE) CAPSULE    Take 1 capsule by mouth daily as needed (diarrhea)    LISINOPRIL (PRINIVIL;ZESTRIL) 20 MG TABLET    Take by mouth daily    METOCLOPRAMIDE (REGLAN) 5 MG TABLET    Take 1 tablet by mouth 3 times daily (before meals)    OMEPRAZOLE (PRILOSEC) 20 MG DELAYED RELEASE CAPSULE    Take 1 capsule by mouth daily    ONDANSETRON (ZOFRAN-ODT) 4 MG DISINTEGRATING TABLET    ceived the following from [facility]: Outside name: ondansetron (ZOFRAN ODT) 4 mg disintegrating tablet    SERTRALINE

## 2024-10-15 NOTE — ED TRIAGE NOTES
Pt arrives to the ER for complaints of possible UTI.     Pt reports that she has chronic UTIs but states that symptoms started about a week ago. Pt reports dysuria and lower abdominal pain.     Denies any nausea, vomiting, fever or chills.

## 2024-10-17 LAB
BACTERIA SPEC CULT: ABNORMAL
CC UR VC: ABNORMAL
SERVICE CMNT-IMP: ABNORMAL

## 2024-10-23 ENCOUNTER — OFFICE VISIT (OUTPATIENT)
Facility: CLINIC | Age: 79
End: 2024-10-23

## 2024-10-23 VITALS
BODY MASS INDEX: 22.96 KG/M2 | DIASTOLIC BLOOD PRESSURE: 73 MMHG | WEIGHT: 129.6 LBS | RESPIRATION RATE: 16 BRPM | SYSTOLIC BLOOD PRESSURE: 130 MMHG | TEMPERATURE: 97.6 F | HEIGHT: 63 IN | HEART RATE: 86 BPM | OXYGEN SATURATION: 98 %

## 2024-10-23 DIAGNOSIS — N39.0 FREQUENT UTI: Primary | ICD-10-CM

## 2024-10-23 DIAGNOSIS — K58.2 IRRITABLE BOWEL SYNDROME WITH BOTH CONSTIPATION AND DIARRHEA: ICD-10-CM

## 2024-10-23 LAB
BILIRUBIN, URINE, POC: NEGATIVE
BLOOD URINE, POC: ABNORMAL
GLUCOSE URINE, POC: NEGATIVE
KETONES, URINE, POC: NEGATIVE
LEUKOCYTE ESTERASE, URINE, POC: ABNORMAL
NITRITE, URINE, POC: NEGATIVE
PH, URINE, POC: 6 (ref 4.6–8)
PROTEIN,URINE, POC: NEGATIVE
SPECIFIC GRAVITY, URINE, POC: 1.01 (ref 1–1.03)
URINALYSIS CLARITY, POC: CLEAR
URINALYSIS COLOR, POC: YELLOW
UROBILINOGEN, POC: ABNORMAL MG/DL

## 2024-10-23 RX ORDER — CIPROFLOXACIN 500 MG/1
500 TABLET, FILM COATED ORAL 2 TIMES DAILY
Qty: 6 TABLET | Refills: 0 | Status: SHIPPED | OUTPATIENT
Start: 2024-10-23 | End: 2024-10-26

## 2024-10-23 SDOH — ECONOMIC STABILITY: FOOD INSECURITY: WITHIN THE PAST 12 MONTHS, YOU WORRIED THAT YOUR FOOD WOULD RUN OUT BEFORE YOU GOT MONEY TO BUY MORE.: NEVER TRUE

## 2024-10-23 SDOH — ECONOMIC STABILITY: FOOD INSECURITY: WITHIN THE PAST 12 MONTHS, THE FOOD YOU BOUGHT JUST DIDN'T LAST AND YOU DIDN'T HAVE MONEY TO GET MORE.: NEVER TRUE

## 2024-10-23 SDOH — ECONOMIC STABILITY: INCOME INSECURITY: HOW HARD IS IT FOR YOU TO PAY FOR THE VERY BASICS LIKE FOOD, HOUSING, MEDICAL CARE, AND HEATING?: NOT HARD AT ALL

## 2024-10-23 ASSESSMENT — PATIENT HEALTH QUESTIONNAIRE - PHQ9
6. FEELING BAD ABOUT YOURSELF - OR THAT YOU ARE A FAILURE OR HAVE LET YOURSELF OR YOUR FAMILY DOWN: NOT AT ALL
SUM OF ALL RESPONSES TO PHQ QUESTIONS 1-9: 4
2. FEELING DOWN, DEPRESSED OR HOPELESS: SEVERAL DAYS
SUM OF ALL RESPONSES TO PHQ9 QUESTIONS 1 & 2: 2
1. LITTLE INTEREST OR PLEASURE IN DOING THINGS: SEVERAL DAYS
3. TROUBLE FALLING OR STAYING ASLEEP: NOT AT ALL
5. POOR APPETITE OR OVEREATING: NOT AT ALL
9. THOUGHTS THAT YOU WOULD BE BETTER OFF DEAD, OR OF HURTING YOURSELF: NOT AT ALL
SUM OF ALL RESPONSES TO PHQ QUESTIONS 1-9: 4
SUM OF ALL RESPONSES TO PHQ QUESTIONS 1-9: 4
8. MOVING OR SPEAKING SO SLOWLY THAT OTHER PEOPLE COULD HAVE NOTICED. OR THE OPPOSITE, BEING SO FIGETY OR RESTLESS THAT YOU HAVE BEEN MOVING AROUND A LOT MORE THAN USUAL: SEVERAL DAYS
10. IF YOU CHECKED OFF ANY PROBLEMS, HOW DIFFICULT HAVE THESE PROBLEMS MADE IT FOR YOU TO DO YOUR WORK, TAKE CARE OF THINGS AT HOME, OR GET ALONG WITH OTHER PEOPLE: NOT DIFFICULT AT ALL
4. FEELING TIRED OR HAVING LITTLE ENERGY: NOT AT ALL
7. TROUBLE CONCENTRATING ON THINGS, SUCH AS READING THE NEWSPAPER OR WATCHING TELEVISION: SEVERAL DAYS
SUM OF ALL RESPONSES TO PHQ QUESTIONS 1-9: 4

## 2024-10-23 NOTE — ASSESSMENT & PLAN NOTE
The patient has a history of recurrent UTIs and recently completed a 7-day course of Cipro 500 mg twice a day, prescribed by [facility]. Despite completing the course, she continues to experience symptoms. A urine culture will be sent for further analysis. An additional 3 days of Cipro will be prescribed and sent to [facility] on [address]. She is advised to follow up with her urologist, Dr. Guanakito Rees at [facility], for further evaluation. Pyridium was discussed but not preferred by the patient due to its side effects.    Orders:    AMB POC URINALYSIS DIP STICK AUTO W/ MICRO    ciprofloxacin (CIPRO) 500 MG tablet; Take 1 tablet by mouth 2 times daily for 3 days    Urinalysis with Reflex to Culture; Future

## 2024-10-23 NOTE — PROGRESS NOTES
Family Medicine Follow-Up Progress Note   [facility]    Patient Patricia Huston  1945, 79 y.o., female  Encounter Date 10/23/24    ASSESSMENT AND PLAN:     Assessment & Plan      Assessment & Plan  Frequent UTI    The patient has a history of recurrent UTIs and recently completed a 7-day course of Cipro 500 mg twice a day, prescribed by [facility]. Despite completing the course, she continues to experience symptoms. A urine culture will be sent for further analysis. An additional 3 days of Cipro will be prescribed and sent to [facility] on [facility]. She is advised to follow up with her urologist, Dr. Guanakito Rees at [facility], for further evaluation. Pyridium was discussed but not preferred by the patient due to its side effects.    Orders:    AMB POC URINALYSIS DIP STICK AUTO W/ MICRO    ciprofloxacin (CIPRO) 500 MG tablet; Take 1 tablet by mouth 2 times daily for 3 days    Urinalysis with Reflex to Culture; Future    Irritable bowel syndrome with both constipation and diarrhea   The patient reports difficulty managing fluid intake due to GERD. She is advised to continue her current management plan and consult Dr. De La Rosa for any GI-related issues. The patient has a history of constipation and has found relief using sugarless candy containing sorbitol. She is advised to continue this regimen as it has been effective.           Orders Placed This Encounter    Urinalysis with Reflex to Culture     Standing Status:   Future     Standing Expiration Date:   10/23/2025     Order Specific Question:   SPECIFY(EX-CATH,MIDSTREAM,CYSTO,ETC)?     Answer:   midstream    AMB POC URINALYSIS DIP STICK AUTO W/ MICRO    linaCLOtide (LINZESS) 72 MCG CAPS capsule     Sig: Take 90 capsules by mouth every morning (before breakfast)    ciprofloxacin (CIPRO) 500 MG tablet     Sig: Take 1 tablet by mouth 2 times daily for 3 days     Dispense:  6 tablet     Refill:  0        Follow-up and

## 2024-10-23 NOTE — ASSESSMENT & PLAN NOTE
The patient reports difficulty managing fluid intake due to GERD. She is advised to continue her current management plan and consult Dr. De La Rosa for any GI-related issues. The patient has a history of constipation and has found relief using sugarless candy containing sorbitol. She is advised to continue this regimen as it has been effective.

## 2024-10-23 NOTE — PROGRESS NOTES
Chief Complaint   Patient presents with    Urinary Tract Infection     Patricia Huston is a 79 y.o. female who presents for a hospital follow up. She was seen on 10/15/2024. Patient was put on Cipro for UTI. She reports she has finished course but symptoms have persisted. She continues to have burning sensation.      \"Have you been to the ER, urgent care clinic since your last visit?  Hospitalized since your last visit?\"    NO    “Have you seen or consulted any other health care providers outside of [facility] since your last visit?”    NO            Click Here for Release of Records Request

## 2024-10-23 NOTE — PATIENT INSTRUCTIONS
Today we discussed:  We are adding an additional 3 days of cipro and will reculture your urine  Please reach out to Dr. Rees to follow up.    Thank you and great to meet you today!  Please reach out on MyChart with any questions.   BENJAMIN Nunez - CNP

## 2024-10-26 LAB
APPEARANCE UR: CLEAR
BACTERIA #/AREA URNS HPF: NORMAL /[HPF]
BACTERIA UR CULT: NORMAL
BILIRUB UR QL STRIP: NEGATIVE
CASTS URNS QL MICRO: NORMAL /LPF
COLOR UR: YELLOW
EPI CELLS #/AREA URNS HPF: NORMAL /HPF (ref 0–10)
GLUCOSE UR QL STRIP: NEGATIVE
HGB UR QL STRIP: NEGATIVE
KETONES UR QL STRIP: NEGATIVE
LEUKOCYTE ESTERASE UR QL STRIP: ABNORMAL
MICRO URNS: ABNORMAL
NITRITE UR QL STRIP: NEGATIVE
PH UR STRIP: 6 [PH] (ref 5–7.5)
PROT UR QL STRIP: NEGATIVE
RBC #/AREA URNS HPF: NORMAL /HPF (ref 0–2)
SP GR UR STRIP: 1.01 (ref 1–1.03)
URINALYSIS REFLEX: ABNORMAL
UROBILINOGEN UR STRIP-MCNC: 0.2 MG/DL (ref 0.2–1)
WBC #/AREA URNS HPF: NORMAL /HPF (ref 0–5)

## 2024-12-02 DIAGNOSIS — F41.9 ANXIETY: ICD-10-CM

## 2024-12-03 RX ORDER — ALPRAZOLAM 0.5 MG
TABLET ORAL
Qty: 90 TABLET | Refills: 0 | Status: SHIPPED | OUTPATIENT
Start: 2024-12-03 | End: 2025-03-03

## 2024-12-13 ENCOUNTER — TELEPHONE (OUTPATIENT)
Facility: CLINIC | Age: 79
End: 2024-12-13

## 2024-12-13 NOTE — TELEPHONE ENCOUNTER
----- Message from Chitra JAMAR sent at 12/13/2024  8:28 AM EST -----  Regarding: ECC Message to Provider  ECC Message to Provider    Relationship to Patient: Self     Additional Information: patient just want to inform her pcp that she cannot come to her appointment today @ 11:30 because she's not feeling today because she's having a diarrhea    FAX NO: [phone]  --------------------------------------------------------------------------------------------------------------------------    Call Back Information: OK to leave message on voicemail  Preferred Call Back Number: Phone [phone]

## 2024-12-16 ENCOUNTER — HOSPITAL ENCOUNTER (EMERGENCY)
Facility: HOSPITAL | Age: 79
Discharge: HOME | End: 2024-12-16
Attending: STUDENT IN AN ORGANIZED HEALTH CARE EDUCATION/TRAINING PROGRAM
Payer: MEDICARE

## 2024-12-16 VITALS
TEMPERATURE: 98.2 F | WEIGHT: 125 LBS | DIASTOLIC BLOOD PRESSURE: 79 MMHG | SYSTOLIC BLOOD PRESSURE: 163 MMHG | HEIGHT: 63 IN | OXYGEN SATURATION: 98 % | BODY MASS INDEX: 22.15 KG/M2 | HEART RATE: 97 BPM | RESPIRATION RATE: 16 BRPM

## 2024-12-16 DIAGNOSIS — N30.01 ACUTE CYSTITIS WITH HEMATURIA: Primary | ICD-10-CM

## 2024-12-16 LAB
APPEARANCE UR: ABNORMAL
BACTERIA URNS QL MICRO: ABNORMAL /HPF
BILIRUB UR QL: NEGATIVE
COLOR UR: ABNORMAL
EPITH CASTS URNS QL MICRO: ABNORMAL /LPF
GLUCOSE UR STRIP.AUTO-MCNC: NEGATIVE MG/DL
HGB UR QL STRIP: ABNORMAL
HYALINE CASTS URNS QL MICRO: ABNORMAL /LPF (ref 0–2)
KETONES UR QL STRIP.AUTO: NEGATIVE MG/DL
LEUKOCYTE ESTERASE UR QL STRIP.AUTO: ABNORMAL
NITRITE UR QL STRIP.AUTO: NEGATIVE
PH UR STRIP: 7 (ref 5–8)
PROT UR STRIP-MCNC: ABNORMAL MG/DL
RBC #/AREA URNS HPF: ABNORMAL /HPF (ref 0–5)
SP GR UR REFRACTOMETRY: 1.01 (ref 1–1.03)
URINE CULTURE IF INDICATED: ABNORMAL
UROBILINOGEN UR QL STRIP.AUTO: 0.2 EU/DL (ref 0.2–1)
WBC URNS QL MICRO: >100 /HPF (ref 0–4)

## 2024-12-16 PROCEDURE — 2580000003 HC RX 258

## 2024-12-16 PROCEDURE — 96372 THER/PROPH/DIAG INJ SC/IM: CPT

## 2024-12-16 PROCEDURE — 87086 URINE CULTURE/COLONY COUNT: CPT

## 2024-12-16 PROCEDURE — 99284 EMERGENCY DEPT VISIT MOD MDM: CPT

## 2024-12-16 PROCEDURE — 81001 URINALYSIS AUTO W/SCOPE: CPT

## 2024-12-16 PROCEDURE — 6360000002 HC RX W HCPCS

## 2024-12-16 RX ADMIN — WATER 1000 MG: 1 INJECTION INTRAMUSCULAR; INTRAVENOUS; SUBCUTANEOUS at 11:46

## 2024-12-16 ASSESSMENT — PAIN - FUNCTIONAL ASSESSMENT: PAIN_FUNCTIONAL_ASSESSMENT: 0-10

## 2024-12-16 ASSESSMENT — PAIN SCALES - GENERAL: PAINLEVEL_OUTOF10: 7

## 2024-12-16 NOTE — DISCHARGE INSTRUCTIONS
Please continue to take the ciprofloxacin that was sent to.  Please take the full course.  For follow-up, please follow-up with your primary care provider in regard to today's visit.  If you have a worsening of your pain, you develop fevers, chills, nausea, vomiting, flank pain please return for reevaluation.    You will find today's results from your urine further back in your discharge paperwork and you may also find these on [facility].

## 2024-12-16 NOTE — ED TRIAGE NOTES
Pt arrives to the ER for complaints of dysuria that started a month ago.     Pt states that she saw her PCP about a month ago for complaints of a UTI and had her urine tested that was unremarkable.     Pt has chronic UTIs.

## 2024-12-16 NOTE — ED NOTES
I personally encountered the patient.  I personally directed the diagnostic, therapeutic and consultative decisions.  I reviewed the advanced care practitioner documented history, exam and MDM.  I performed a substantive portion of the medical decision making.    The patient presented with   Chief Complaint   Patient presents with    Dysuria       I personally found patient resting comfortably, no acute distress, hemodynamically stable, reports no fevers or chills reports no flank pain nausea or vomiting, reports history of UTIs with similar symptoms in the past.  No evidence of sepsis at this time no indication for labs or imaging.  UA reviewed and shows evidence of leukocyte esterase and WBCs and bacteria concerning for UTI.  Patient will be given a dose of Rocephin in the ED, already has a prescription for ciprofloxacin that was sent by her PCP, advised her to continue ciprofloxacin at home.  Strict turn precautions advised.      ICD-10-CM    1. Acute cystitis with hematuria  N30.01            MD Estrella Goddard Shannon C, MD  12/16/24 1142

## 2024-12-16 NOTE — ED PROVIDER NOTES
distress.     Appearance: Normal appearance. She is not ill-appearing, toxic-appearing or diaphoretic.   HENT:      Head: Normocephalic.   Eyes:      Extraocular Movements: Extraocular movements intact.   Cardiovascular:      Rate and Rhythm: Normal rate.   Pulmonary:      Effort: Pulmonary effort is normal.   Abdominal:      Tenderness: There is abdominal tenderness. There is no right CVA tenderness or left CVA tenderness.      Comments: Suprapubic tenderness   Musculoskeletal:         General: Normal range of motion.      Cervical back: Normal range of motion.   Skin:     General: Skin is warm and dry.   Neurological:      General: No focal deficit present.      Mental Status: She is alert.   Psychiatric:         Mood and Affect: Mood normal.         Behavior: Behavior normal.         DIAGNOSTIC RESULTS     EKG: All EKG's are interpreted by the Emergency Department Physician who either signs or Co-signs this chart in the absence of a cardiologist.        RADIOLOGY:   Non-plain film images such as CT, Ultrasound and MRI are read by the radiologist. Plain radiographic images are visualized and preliminarily interpreted by the emergency physician with the below findings:        Interpretation per the Radiologist below, if available at the time of this note:    No orders to display        LABS:  Labs Reviewed   URINALYSIS WITH REFLEX TO CULTURE - Abnormal; Notable for the following components:       Result Value    Appearance CLOUDY (*)     Protein, UA TRACE (*)     Blood, Urine TRACE (*)     Leukocyte Esterase, Urine LARGE (*)     Urine Culture if Indicated URINE CULTURE ORDERED (*)     WBC, UA >100 (*)     Epithelial Cells, UA MANY (*)     BACTERIA, URINE TOO NUMEROUS TO COUNT (*)     All other components within normal limits   CULTURE, URINE       All other labs were within normal range or not returned as of this dictation.    EMERGENCY DEPARTMENT COURSE and DIFFERENTIAL DIAGNOSIS/MDM:   Vitals:    Vitals:

## 2024-12-18 LAB
BACTERIA SPEC CULT: NORMAL
CC UR VC: NORMAL
SERVICE CMNT-IMP: NORMAL

## 2025-01-13 RX ORDER — SERTRALINE HYDROCHLORIDE 100 MG/1
100 TABLET, FILM COATED ORAL DAILY
Qty: 90 TABLET | Refills: 0 | Status: SHIPPED | OUTPATIENT
Start: 2025-01-13

## 2025-01-21 RX ORDER — METOCLOPRAMIDE 5 MG/1
5 TABLET ORAL
Qty: 120 TABLET | Refills: 0 | OUTPATIENT
Start: 2025-01-21

## 2025-01-21 RX ORDER — METOCLOPRAMIDE 5 MG/1
5 TABLET ORAL DAILY
Qty: 90 TABLET | Refills: 1 | Status: SHIPPED | OUTPATIENT
Start: 2025-01-21

## 2025-01-21 RX ORDER — LISINOPRIL 20 MG/1
20 TABLET ORAL DAILY
Qty: 90 TABLET | Refills: 97 | OUTPATIENT
Start: 2025-01-21

## 2025-01-21 RX ORDER — LISINOPRIL 20 MG/1
20 TABLET ORAL DAILY
Qty: 90 TABLET | Refills: 1 | Status: SHIPPED | OUTPATIENT
Start: 2025-01-21

## 2025-01-21 RX ORDER — METOCLOPRAMIDE 5 MG/1
5 TABLET ORAL NIGHTLY
Qty: 90 TABLET | Refills: 97 | OUTPATIENT
Start: 2025-01-21

## 2025-01-21 NOTE — TELEPHONE ENCOUNTER
[facility] faxed request   Kayla Looney    METOCLOPRAMIDE 5 MG TABLET  Take one tablet by mouth at bedtime    There is another medication pending to be sent to [facility] but looks like they have been using [facility]  JUAREZ

## 2025-01-22 DIAGNOSIS — I10 PRIMARY HYPERTENSION: ICD-10-CM

## 2025-01-24 NOTE — DISCHARGE SUMMARY
Rx Refill Note  Requested Prescriptions     Pending Prescriptions Disp Refills    meloxicam (MOBIC) 15 MG tablet [Pharmacy Med Name: meloxicam 15 mg tablet] 90 tablet 1     Sig: TAKE 1 TABLET BY MOUTH ONCE DAILY AS NEEDED FOR moderate pain      Last office visit with prescribing clinician: 8/7/2024   Last telemedicine visit with prescribing clinician: Visit date not found   Next office visit with prescribing clinician: 2/12/2025    KHADRA De La Cruz is out of the office until 01/28/25, sent to on call provider Dr Deal.     Florencia Donovan LPN  01/24/25, 09:35 EST   kidney. No hydronephrosis or ureteral dilation. No  nephrolithiasis.  STOMACH: Unremarkable.  SMALL BOWEL: No dilatation or wall thickening.  COLON: Diffuse colonic diverticulosis. No evidence of active inflammation.  APPENDIX: Normal  PERITONEUM: No ascites or pneumoperitoneum.  RETROPERITONEUM: No lymphadenopathy or aortic aneurysm.  REPRODUCTIVE ORGANS: Normal  URINARY BLADDER: Normal  BONES: T11 and T12 compression fractures, unchanged.  ABDOMINAL WALL: No mass or hernia.  ADDITIONAL COMMENTS: N/A    Impression  1.  No acute intra-abdominal pathology.  2.  Postsurgical changes in the lower right kidney.  3.  Stable chronic patulous appearance of the right renal pelvis. No stones or  hydronephrosis.  4.  Colonic diverticulosis without evidence of active inflammation.      Chronic diagnoses   Patient Active Problem List   Diagnosis   • Frequent UTI   • S/P partial colectomy   • Elevated liver enzymes   • IBS (irritable bowel syndrome)   • MDD (major depressive disorder), recurrent episode, with atypical features (HCC)   • Primary hypertension   • History of renal stone   • Nonalcoholic fatty liver disease without nonalcoholic steatohepatitis (CONTEH)   • Mixed hyperlipidemia   • Stage 3a chronic kidney disease (HCC)   • Generalized weakness   • Infection due to ESBL-producing Escherichia coli   • Lactic acidosis   • Thrombocytopenia (HCC)   • Nonrheumatic aortic (valve) stenosis   • Colon, diverticulosis           Medication List        START taking these medications      lactobacillus capsule  Take 1 capsule by mouth daily as needed (diarrhea)            CONTINUE taking these medications      ALPRAZolam 0.5 MG tablet  Commonly known as: XANAX     amLODIPine 5 MG tablet  Commonly known as: NORVASC     estradiol 0.1 MG/GM vaginal cream  Commonly known as: ESTRACE     lisinopril 20 MG tablet  Commonly known as: PRINIVIL;ZESTRIL     metoclopramide 5 MG tablet  Commonly known as: REGLAN     omeprazole 20 MG delayed

## 2025-03-01 DIAGNOSIS — F41.9 ANXIETY: ICD-10-CM

## 2025-03-04 RX ORDER — ALPRAZOLAM 0.5 MG
TABLET ORAL
Qty: 90 TABLET | Refills: 0 | Status: SHIPPED | OUTPATIENT
Start: 2025-03-04 | End: 2025-06-02

## 2025-04-01 ENCOUNTER — OFFICE VISIT (OUTPATIENT)
Facility: CLINIC | Age: 80
End: 2025-04-01
Payer: MEDICARE

## 2025-04-01 VITALS
HEIGHT: 63 IN | SYSTOLIC BLOOD PRESSURE: 134 MMHG | HEART RATE: 84 BPM | OXYGEN SATURATION: 97 % | WEIGHT: 122 LBS | DIASTOLIC BLOOD PRESSURE: 80 MMHG | BODY MASS INDEX: 21.62 KG/M2 | TEMPERATURE: 97 F | RESPIRATION RATE: 16 BRPM

## 2025-04-01 DIAGNOSIS — Z00.00 MEDICARE ANNUAL WELLNESS VISIT, SUBSEQUENT: Primary | ICD-10-CM

## 2025-04-01 DIAGNOSIS — N39.0 FREQUENT UTI: ICD-10-CM

## 2025-04-01 LAB
BILIRUBIN, URINE, POC: NEGATIVE
BLOOD URINE, POC: NORMAL
GLUCOSE URINE, POC: NEGATIVE
KETONES, URINE, POC: NEGATIVE
LEUKOCYTE ESTERASE, URINE, POC: NORMAL
NITRITE, URINE, POC: NEGATIVE
PH, URINE, POC: 6.5 (ref 4.6–8)
PROTEIN,URINE, POC: NEGATIVE
SPECIFIC GRAVITY, URINE, POC: 1.02 (ref 1–1.03)
URINALYSIS CLARITY, POC: NORMAL
URINALYSIS COLOR, POC: NORMAL
UROBILINOGEN, POC: NORMAL

## 2025-04-01 PROCEDURE — G0439 PPPS, SUBSEQ VISIT: HCPCS | Performed by: FAMILY MEDICINE

## 2025-04-01 PROCEDURE — 3075F SYST BP GE 130 - 139MM HG: CPT | Performed by: FAMILY MEDICINE

## 2025-04-01 PROCEDURE — 81003 URINALYSIS AUTO W/O SCOPE: CPT | Performed by: FAMILY MEDICINE

## 2025-04-01 PROCEDURE — 99214 OFFICE O/P EST MOD 30 MIN: CPT | Performed by: FAMILY MEDICINE

## 2025-04-01 PROCEDURE — 1159F MED LIST DOCD IN RCRD: CPT | Performed by: FAMILY MEDICINE

## 2025-04-01 PROCEDURE — 1125F AMNT PAIN NOTED PAIN PRSNT: CPT | Performed by: FAMILY MEDICINE

## 2025-04-01 PROCEDURE — 1123F ACP DISCUSS/DSCN MKR DOCD: CPT | Performed by: FAMILY MEDICINE

## 2025-04-01 PROCEDURE — 3079F DIAST BP 80-89 MM HG: CPT | Performed by: FAMILY MEDICINE

## 2025-04-01 RX ORDER — CEFDINIR 300 MG/1
300 CAPSULE ORAL DAILY
COMMUNITY
Start: 2025-03-11 | End: 2025-04-01

## 2025-04-01 RX ORDER — GRANULES FOR ORAL 3 G/1
3 POWDER ORAL ONCE
COMMUNITY
Start: 2025-02-07 | End: 2025-04-01

## 2025-04-01 RX ORDER — CIPROFLOXACIN 500 MG/1
500 TABLET, FILM COATED ORAL 2 TIMES DAILY
COMMUNITY
Start: 2025-02-19 | End: 2025-04-01

## 2025-04-01 SDOH — ECONOMIC STABILITY: FOOD INSECURITY: WITHIN THE PAST 12 MONTHS, THE FOOD YOU BOUGHT JUST DIDN'T LAST AND YOU DIDN'T HAVE MONEY TO GET MORE.: NEVER TRUE

## 2025-04-01 SDOH — ECONOMIC STABILITY: FOOD INSECURITY: WITHIN THE PAST 12 MONTHS, YOU WORRIED THAT YOUR FOOD WOULD RUN OUT BEFORE YOU GOT MONEY TO BUY MORE.: NEVER TRUE

## 2025-04-01 ASSESSMENT — LIFESTYLE VARIABLES
HOW MANY STANDARD DRINKS CONTAINING ALCOHOL DO YOU HAVE ON A TYPICAL DAY: 1 OR 2
HOW OFTEN DO YOU HAVE A DRINK CONTAINING ALCOHOL: MONTHLY OR LESS

## 2025-04-01 ASSESSMENT — PATIENT HEALTH QUESTIONNAIRE - PHQ9
4. FEELING TIRED OR HAVING LITTLE ENERGY: NOT AT ALL
10. IF YOU CHECKED OFF ANY PROBLEMS, HOW DIFFICULT HAVE THESE PROBLEMS MADE IT FOR YOU TO DO YOUR WORK, TAKE CARE OF THINGS AT HOME, OR GET ALONG WITH OTHER PEOPLE: NOT DIFFICULT AT ALL
7. TROUBLE CONCENTRATING ON THINGS, SUCH AS READING THE NEWSPAPER OR WATCHING TELEVISION: NOT AT ALL
8. MOVING OR SPEAKING SO SLOWLY THAT OTHER PEOPLE COULD HAVE NOTICED. OR THE OPPOSITE, BEING SO FIGETY OR RESTLESS THAT YOU HAVE BEEN MOVING AROUND A LOT MORE THAN USUAL: NOT AT ALL
6. FEELING BAD ABOUT YOURSELF - OR THAT YOU ARE A FAILURE OR HAVE LET YOURSELF OR YOUR FAMILY DOWN: NOT AT ALL
SUM OF ALL RESPONSES TO PHQ QUESTIONS 1-9: 0
5. POOR APPETITE OR OVEREATING: NOT AT ALL
1. LITTLE INTEREST OR PLEASURE IN DOING THINGS: NOT AT ALL
3. TROUBLE FALLING OR STAYING ASLEEP: NOT AT ALL
SUM OF ALL RESPONSES TO PHQ QUESTIONS 1-9: 0
9. THOUGHTS THAT YOU WOULD BE BETTER OFF DEAD, OR OF HURTING YOURSELF: NOT AT ALL
2. FEELING DOWN, DEPRESSED OR HOPELESS: NOT AT ALL
SUM OF ALL RESPONSES TO PHQ QUESTIONS 1-9: 0
SUM OF ALL RESPONSES TO PHQ QUESTIONS 1-9: 0

## 2025-04-01 NOTE — PROGRESS NOTES
Chief Complaint   Patient presents with    Medicare AWV     Patricia Huston is a 80 y.o. female who presents for annual physical exam. Her most recent physical exam was July 2024. New concerns: uti Feb and March antibx not working as well. Sees Dr Rees at [facility] for years     \"Have you been to the ER, urgent care clinic since your last visit?  Hospitalized since your last visit?\"    NO    “Have you seen or consulted any other health care providers outside of [facility] since your last visit?”    NO            Click Here for Release of Records Request  
Reactions    Cephalexin     Doxycycline     Metaxalone     Metronidazole Nausea Only    Nitrofurantoin Nausea Only    Sulfa Antibiotics Rash     Prior to Visit Medications    Medication Sig Taking? Authorizing Provider   ALPRAZolam (XANAX) 0.5 MG tablet TAKE ONE TABLET BY MOUTH EVERY EVENING AS NEEDED FOR SLEEP FOR 90 DAYS. MAXIMUM DAILY AMOUNT 0.5MG Yes Marcos Edwards MD   lisinopril (PRINIVIL;ZESTRIL) 20 MG tablet Take 1 tablet by mouth daily Yes Marcos Edwards MD   metoclopramide (REGLAN) 5 MG tablet Take 1 tablet by mouth daily Yes Marcos Edwards MD   sertraline (ZOLOFT) 100 MG tablet TAKE ONE TABLET BY MOUTH ONE TIME DAILY Yes Marcos Edwards MD   omeprazole (PRILOSEC) 20 MG delayed release capsule TAKE ONE CAPSULE BY MOUTH ONE TIME DAILY Yes Marcos Edwards MD   amLODIPine (NORVASC) 5 MG tablet Take 1 tablet by mouth daily Yes Marcos Edwards MD   estradiol (ESTRACE) 0.1 MG/GM vaginal cream Place 2 g vaginally three times a week Yes Carmel Campbell MD   ondansetron (ZOFRAN-ODT) 4 MG disintegrating tablet Take 1 tablet by mouth every 8 hours as needed ceived the following from [facility]: Outside name: ondansetron (ZOFRAN ODT) 4 mg disintegrating tablet Yes Automatic Reconciliation, Ar       CareTeam (Including outside providers/suppliers regularly involved in providing care):   Patient Care Team:  Marcos Edwards MD as PCP - General (Family Medicine)  [facility]  Stephy Bhat as Ambulatory Care Manager     Recommendations for Preventive Services Due: see orders and patient instructions/AVS.  Recommended screening schedule for the next 5-10 years is provided to the patient in written form: see Patient Instructions/AVS.     Reviewed and updated this visit:  Allergies  Meds  Sexual Hx                  The patient (or guardian, if applicable) and other individuals in attendance with the patient were advised that [employer] will be

## 2025-04-01 NOTE — PATIENT INSTRUCTIONS
Probiotics:  A healthy gut needs good tenants. If looking to increase your gut microbe diversity, consider temporary or intermittent supplementation with multiple natural gut bacteria. They are still researching what species perform which functions in the gut, but generally the more variety, the healthier the microbiome.     Probiotic foods: try apple cider vinegar    Take a probiotic with the following:  The Lactobacillus genus, including L. acidophilus, L. rhamnosus, L. casei and L. plantarum.  The Bifidobacterium genus, including Bifidobacterium longum and Bifidobacterium breve.  The Saccharomyces boulardii yeast is a beneficial fungus that acts as a regulator/nanny for these bacteria.    Economic and effective: Nature's ciValue Probiotic 10, Culturelle, Align.  More expensive but ideal: Seed DS-01 Daily Synbiotic - Prebiotic and Probiotic     Fiber:  High Fiber with water increase helps regulate gut microbiome and intestinal health, which boosts the metabolism.  Types of Soluble Fiber to eat more of:  Inulin  XOS, also called xylooligosaccharides  Other sources seen in Education Handout         Preventing Falls: Care Instructions  Injuries and health problems such as trouble walking or poor eyesight can increase your risk of falling. So can some medicines. But there are things you can do to help prevent falls. You can exercise to get stronger. You can also arrange your home to make it safer.    Talk to your doctor about the medicines you take. Ask if any of them increase the risk of falls and whether they can be changed or stopped.   Try to exercise regularly. It can help improve your strength and balance. This can help lower your risk of falling.         Practice fall safety and prevention.   Wear low-heeled shoes that fit well and give your feet good support. Talk to your doctor if you have foot problems that make this hard.  Carry a cellphone or wear a medical alert device that you can use to call for

## 2025-04-02 LAB
APPEARANCE UR: CLEAR
BACTERIA #/AREA URNS HPF: NORMAL /[HPF]
BILIRUB UR QL STRIP: NEGATIVE
CASTS URNS QL MICRO: NORMAL /LPF
COLOR UR: YELLOW
EPI CELLS #/AREA URNS HPF: NORMAL /HPF (ref 0–10)
GLUCOSE UR QL STRIP: NEGATIVE
HGB UR QL STRIP: NEGATIVE
KETONES UR QL STRIP: NEGATIVE
LEUKOCYTE ESTERASE UR QL STRIP: ABNORMAL
MICRO URNS: ABNORMAL
NITRITE UR QL STRIP: NEGATIVE
PH UR STRIP: 6 [PH] (ref 5–7.5)
PROT UR QL STRIP: NEGATIVE
RBC #/AREA URNS HPF: NORMAL /HPF (ref 0–2)
SP GR UR STRIP: 1.01 (ref 1–1.03)
UROBILINOGEN UR STRIP-MCNC: 0.2 MG/DL (ref 0.2–1)
WBC #/AREA URNS HPF: NORMAL /HPF (ref 0–5)

## 2025-04-03 ENCOUNTER — RESULTS FOLLOW-UP (OUTPATIENT)
Facility: CLINIC | Age: 80
End: 2025-04-03

## 2025-04-03 LAB — BACTERIA UR CULT: NORMAL

## 2025-04-11 RX ORDER — SERTRALINE HYDROCHLORIDE 100 MG/1
100 TABLET, FILM COATED ORAL DAILY
Qty: 90 TABLET | Refills: 3 | Status: SHIPPED | OUTPATIENT
Start: 2025-04-11

## 2025-04-11 RX ORDER — OMEPRAZOLE 20 MG/1
20 CAPSULE, DELAYED RELEASE ORAL DAILY
Qty: 90 CAPSULE | Refills: 3 | Status: SHIPPED | OUTPATIENT
Start: 2025-04-11

## 2025-05-01 ENCOUNTER — OFFICE VISIT (OUTPATIENT)
Facility: CLINIC | Age: 80
End: 2025-05-01
Payer: MEDICARE

## 2025-05-01 VITALS
OXYGEN SATURATION: 97 % | SYSTOLIC BLOOD PRESSURE: 125 MMHG | BODY MASS INDEX: 21.44 KG/M2 | RESPIRATION RATE: 16 BRPM | DIASTOLIC BLOOD PRESSURE: 75 MMHG | WEIGHT: 121 LBS | HEART RATE: 76 BPM | TEMPERATURE: 98 F | HEIGHT: 63 IN

## 2025-05-01 DIAGNOSIS — N39.0 FREQUENT UTI: Primary | ICD-10-CM

## 2025-05-01 LAB
BILIRUBIN, URINE, POC: NEGATIVE
BLOOD URINE, POC: ABNORMAL
GLUCOSE URINE, POC: NEGATIVE
KETONES, URINE, POC: NEGATIVE
LEUKOCYTE ESTERASE, URINE, POC: ABNORMAL
NITRITE, URINE, POC: POSITIVE
PH, URINE, POC: 6 (ref 4.6–8)
PROTEIN,URINE, POC: NEGATIVE
SPECIFIC GRAVITY, URINE, POC: 1.02 (ref 1–1.03)
URINALYSIS CLARITY, POC: CLEAR
URINALYSIS COLOR, POC: YELLOW
UROBILINOGEN, POC: ABNORMAL MG/DL

## 2025-05-01 PROCEDURE — 3078F DIAST BP <80 MM HG: CPT | Performed by: FAMILY MEDICINE

## 2025-05-01 PROCEDURE — 1123F ACP DISCUSS/DSCN MKR DOCD: CPT | Performed by: FAMILY MEDICINE

## 2025-05-01 PROCEDURE — 1126F AMNT PAIN NOTED NONE PRSNT: CPT | Performed by: FAMILY MEDICINE

## 2025-05-01 PROCEDURE — 1159F MED LIST DOCD IN RCRD: CPT | Performed by: FAMILY MEDICINE

## 2025-05-01 PROCEDURE — 81001 URINALYSIS AUTO W/SCOPE: CPT | Performed by: FAMILY MEDICINE

## 2025-05-01 PROCEDURE — 3074F SYST BP LT 130 MM HG: CPT | Performed by: FAMILY MEDICINE

## 2025-05-01 PROCEDURE — 99214 OFFICE O/P EST MOD 30 MIN: CPT | Performed by: FAMILY MEDICINE

## 2025-05-01 RX ORDER — CEFDINIR 300 MG/1
300 CAPSULE ORAL 2 TIMES DAILY
Qty: 14 CAPSULE | Refills: 0 | Status: SHIPPED | OUTPATIENT
Start: 2025-05-01 | End: 2025-05-08

## 2025-05-01 NOTE — PROGRESS NOTES
Assessment & Plan  1. Urinary Tract Infection: Acute.  - Presents with suprapubic discomfort, dysuria, and delayed initiation of urination for one week. Point-of-care ultrasound showed hematuria, pyuria, and nitrites, indicative of UTI. History of recurrent UTIs and antibiotic intolerance causing nausea, including nitrofurantoin.  - Urine culture will be sent for further analysis.  - Insurance covers PCR test for more in-depth analysis, with one-day turnaround.  - Prefers to wait for culture results before starting antibiotics, as advised by urologist Dr. Rees.    2. IBS-constipation and diarrhea  - re-enforced fiber supplemnts and probiotics, and provided additional brands/types.    Follow-up  - Appointment with urologist Dr. Rees at the end of May 2025.    It was a pleasure seeing Ms. Patricia Huston today.  No follow-ups on file.    History of Present Illness  The patient, an 80-year-old female, presents with concerns of a urinary tract infection (UTI).    Urinary Tract Infection (UTI)  - She reports experiencing mild suprapubic discomfort for the past week.  - Associated symptoms include dysuria and urinary hesitancy.  - Point-of-care ultrasound findings revealed hematuria, pyuria, and the presence of nitrites.  - The patient has a history of recurrent UTIs with previous antibiotic treatments proving ineffective.  - She is intolerant to Keflex, doxycycline, and nitrofurantoin, experiencing nausea with these medications.    Supplemental information: Urologist Dr. Rees will initiate treatment following the results of urine culture. An appointment with Dr. Rees is scheduled for the end of May 2025.    The following portions of the patient's history were reviewed and updated as appropriate: allergies, current medications, family history, medical history, social history, surgical history and problem list.    /75   Pulse 76   Temp 98 °F (36.7 °C)   Resp 16   Ht 1.6 m (5' 3\")   Wt 54.9 kg (121 lb)

## 2025-05-02 LAB
APPEARANCE UR: ABNORMAL
BACTERIA #/AREA URNS HPF: ABNORMAL /[HPF]
BILIRUB UR QL STRIP: NEGATIVE
CASTS URNS QL MICRO: ABNORMAL /LPF
COLOR UR: YELLOW
EPI CELLS #/AREA URNS HPF: ABNORMAL /HPF (ref 0–10)
GLUCOSE UR QL STRIP: NEGATIVE
HGB UR QL STRIP: ABNORMAL
KETONES UR QL STRIP: NEGATIVE
LEUKOCYTE ESTERASE UR QL STRIP: ABNORMAL
MICRO URNS: ABNORMAL
NITRITE UR QL STRIP: POSITIVE
PH UR STRIP: 5.5 [PH] (ref 5–7.5)
PROT UR QL STRIP: NEGATIVE
RBC #/AREA URNS HPF: ABNORMAL /HPF (ref 0–2)
SP GR UR STRIP: 1.02 (ref 1–1.03)
UROBILINOGEN UR STRIP-MCNC: 0.2 MG/DL (ref 0.2–1)
WBC #/AREA URNS HPF: >30 /HPF (ref 0–5)

## 2025-05-03 LAB — BACTERIA UR CULT: NORMAL

## 2025-05-06 ENCOUNTER — RESULTS FOLLOW-UP (OUTPATIENT)
Facility: CLINIC | Age: 80
End: 2025-05-06

## 2025-06-02 DIAGNOSIS — F41.9 ANXIETY: ICD-10-CM

## 2025-06-02 RX ORDER — ALPRAZOLAM 0.5 MG
0.5 TABLET ORAL NIGHTLY PRN
Qty: 90 TABLET | Refills: 0 | Status: SHIPPED | OUTPATIENT
Start: 2025-06-02 | End: 2025-08-31

## 2025-07-26 DIAGNOSIS — I10 PRIMARY HYPERTENSION: ICD-10-CM

## 2025-07-29 RX ORDER — LISINOPRIL 20 MG/1
20 TABLET ORAL DAILY
Qty: 90 TABLET | Refills: 3 | Status: SHIPPED | OUTPATIENT
Start: 2025-07-29

## 2025-07-29 RX ORDER — AMLODIPINE BESYLATE 5 MG/1
5 TABLET ORAL DAILY
Qty: 90 TABLET | Refills: 3 | Status: SHIPPED | OUTPATIENT
Start: 2025-07-29

## 2025-08-08 RX ORDER — METOCLOPRAMIDE 5 MG/1
5 TABLET ORAL DAILY
Qty: 90 TABLET | Refills: 1 | Status: SHIPPED | OUTPATIENT
Start: 2025-08-08

## 2025-08-30 DIAGNOSIS — F41.9 ANXIETY: ICD-10-CM

## 2025-09-02 RX ORDER — ALPRAZOLAM 0.5 MG
TABLET ORAL
Qty: 90 TABLET | Refills: 0 | Status: SHIPPED | OUTPATIENT
Start: 2025-09-02 | End: 2025-12-01